# Patient Record
Sex: MALE | Race: WHITE | NOT HISPANIC OR LATINO | Employment: FULL TIME | URBAN - METROPOLITAN AREA
[De-identification: names, ages, dates, MRNs, and addresses within clinical notes are randomized per-mention and may not be internally consistent; named-entity substitution may affect disease eponyms.]

---

## 2018-11-06 ENCOUNTER — LAB VISIT (OUTPATIENT)
Dept: LAB | Facility: HOSPITAL | Age: 43
End: 2018-11-06
Attending: PHYSICAL MEDICINE & REHABILITATION
Payer: COMMERCIAL

## 2018-11-06 DIAGNOSIS — M54.50 LOW BACK PAIN: Primary | ICD-10-CM

## 2018-11-06 LAB
ANION GAP SERPL CALC-SCNC: 9 MMOL/L
BASOPHILS # BLD AUTO: 0.04 K/UL
BASOPHILS NFR BLD: 0.8 %
BUN SERPL-MCNC: 12 MG/DL
CALCIUM SERPL-MCNC: 9.6 MG/DL
CHLORIDE SERPL-SCNC: 107 MMOL/L
CO2 SERPL-SCNC: 22 MMOL/L
CREAT SERPL-MCNC: 0.8 MG/DL
DIFFERENTIAL METHOD: NORMAL
EOSINOPHIL # BLD AUTO: 0.3 K/UL
EOSINOPHIL NFR BLD: 5.3 %
ERYTHROCYTE [DISTWIDTH] IN BLOOD BY AUTOMATED COUNT: 11.9 %
EST. GFR  (AFRICAN AMERICAN): >60 ML/MIN/1.73 M^2
EST. GFR  (NON AFRICAN AMERICAN): >60 ML/MIN/1.73 M^2
GLUCOSE SERPL-MCNC: 119 MG/DL
HCT VFR BLD AUTO: 46.9 %
HGB BLD-MCNC: 15.8 G/DL
IMM GRANULOCYTES # BLD AUTO: 0.01 K/UL
IMM GRANULOCYTES NFR BLD AUTO: 0.2 %
LYMPHOCYTES # BLD AUTO: 1.3 K/UL
LYMPHOCYTES NFR BLD: 26.8 %
MCH RBC QN AUTO: 29.2 PG
MCHC RBC AUTO-ENTMCNC: 33.7 G/DL
MCV RBC AUTO: 87 FL
MONOCYTES # BLD AUTO: 0.4 K/UL
MONOCYTES NFR BLD: 9.1 %
NEUTROPHILS # BLD AUTO: 2.7 K/UL
NEUTROPHILS NFR BLD: 57.8 %
NRBC BLD-RTO: 0 /100 WBC
PLATELET # BLD AUTO: 232 K/UL
PMV BLD AUTO: 10.8 FL
POTASSIUM SERPL-SCNC: 4.7 MMOL/L
RBC # BLD AUTO: 5.41 M/UL
SODIUM SERPL-SCNC: 138 MMOL/L
WBC # BLD AUTO: 4.74 K/UL

## 2018-11-06 PROCEDURE — 85025 COMPLETE CBC W/AUTO DIFF WBC: CPT

## 2018-11-06 PROCEDURE — 80048 BASIC METABOLIC PNL TOTAL CA: CPT

## 2018-11-06 PROCEDURE — 36415 COLL VENOUS BLD VENIPUNCTURE: CPT

## 2022-08-15 DIAGNOSIS — M25.511 RIGHT SHOULDER PAIN, UNSPECIFIED CHRONICITY: Primary | ICD-10-CM

## 2022-10-24 ENCOUNTER — HOSPITAL ENCOUNTER (OUTPATIENT)
Dept: RADIOLOGY | Facility: HOSPITAL | Age: 47
Discharge: HOME | End: 2022-10-24
Attending: PHYSICIAN ASSISTANT
Payer: COMMERCIAL

## 2022-10-24 ENCOUNTER — OFFICE VISIT (OUTPATIENT)
Dept: ORTHOPEDICS | Facility: CLINIC | Age: 47
End: 2022-10-24
Payer: COMMERCIAL

## 2022-10-24 VITALS — BODY MASS INDEX: 45.1 KG/M2 | HEIGHT: 70 IN | WEIGHT: 315 LBS

## 2022-10-24 DIAGNOSIS — R07.89 PAIN OF STERNUM: ICD-10-CM

## 2022-10-24 DIAGNOSIS — R07.89 PAIN OF STERNUM: Primary | ICD-10-CM

## 2022-10-24 DIAGNOSIS — M25.511 RIGHT SHOULDER PAIN, UNSPECIFIED CHRONICITY: ICD-10-CM

## 2022-10-24 DIAGNOSIS — M25.511 RIGHT SHOULDER PAIN, UNSPECIFIED CHRONICITY: Primary | ICD-10-CM

## 2022-10-24 DIAGNOSIS — S43.61XA SPRAIN OF RIGHT STERNOCLAVICULAR JOINT, INITIAL ENCOUNTER: ICD-10-CM

## 2022-10-24 PROCEDURE — 73030 X-RAY EXAM OF SHOULDER: CPT | Mod: TC,RT

## 2022-10-24 PROCEDURE — 73030 X-RAY EXAM OF SHOULDER: CPT | Mod: 26,RT,, | Performed by: RADIOLOGY

## 2022-10-24 PROCEDURE — 1160F RVW MEDS BY RX/DR IN RCRD: CPT | Mod: CPTII,S$GLB,, | Performed by: PHYSICIAN ASSISTANT

## 2022-10-24 PROCEDURE — 71130 X-RAY STRENOCLAVIC JT 3/>VWS: CPT | Mod: 26,,, | Performed by: RADIOLOGY

## 2022-10-24 PROCEDURE — 99999 PR PBB SHADOW E&M-EST. PATIENT-LVL III: ICD-10-PCS | Mod: PBBFAC,,, | Performed by: PHYSICIAN ASSISTANT

## 2022-10-24 PROCEDURE — 71130 X-RAY STRENOCLAVIC JT 3/>VWS: CPT | Mod: TC

## 2022-10-24 PROCEDURE — 1160F PR REVIEW ALL MEDS BY PRESCRIBER/CLIN PHARMACIST DOCUMENTED: ICD-10-PCS | Mod: CPTII,S$GLB,, | Performed by: PHYSICIAN ASSISTANT

## 2022-10-24 PROCEDURE — 71130 XR STERNOCLAVICULAR JOINTS MIN 3 VIEW: ICD-10-PCS | Mod: 26,,, | Performed by: RADIOLOGY

## 2022-10-24 PROCEDURE — 99203 PR OFFICE/OUTPT VISIT, NEW, LEVL III, 30-44 MIN: ICD-10-PCS | Mod: S$GLB,,, | Performed by: PHYSICIAN ASSISTANT

## 2022-10-24 PROCEDURE — 99203 OFFICE O/P NEW LOW 30 MIN: CPT | Mod: S$GLB,,, | Performed by: PHYSICIAN ASSISTANT

## 2022-10-24 PROCEDURE — 1159F MED LIST DOCD IN RCRD: CPT | Mod: CPTII,S$GLB,, | Performed by: PHYSICIAN ASSISTANT

## 2022-10-24 PROCEDURE — 73030 XR SHOULDER COMPLETE 2 OR MORE VIEWS RIGHT: ICD-10-PCS | Mod: 26,RT,, | Performed by: RADIOLOGY

## 2022-10-24 PROCEDURE — 99999 PR PBB SHADOW E&M-EST. PATIENT-LVL III: CPT | Mod: PBBFAC,,, | Performed by: PHYSICIAN ASSISTANT

## 2022-10-24 PROCEDURE — 1159F PR MEDICATION LIST DOCUMENTED IN MEDICAL RECORD: ICD-10-PCS | Mod: CPTII,S$GLB,, | Performed by: PHYSICIAN ASSISTANT

## 2022-10-24 RX ORDER — NAPROXEN 500 MG/1
500 TABLET ORAL 2 TIMES DAILY
Qty: 60 TABLET | Refills: 0 | Status: ON HOLD | OUTPATIENT
Start: 2022-10-24 | End: 2023-10-10 | Stop reason: HOSPADM

## 2022-10-24 RX ORDER — METHYLPREDNISOLONE 4 MG/1
TABLET ORAL
Qty: 21 EACH | Refills: 0 | Status: SHIPPED | OUTPATIENT
Start: 2022-10-24 | End: 2022-11-14

## 2022-10-24 NOTE — PROGRESS NOTES
"Orthopaedics Sports Medicine     Shoulder Initial Visit         10/24/2022    Referring MD: Self, Aaareferral    Chief Complaint   Patient presents with    Right Shoulder - Pain         History of Present Illness:   Pio Navarro is a 47 y.o. right-hand dominant male who presents with right shoulder pain and dysfunction.    Onset of the symptoms was about 2 months ago, mid august     Inciting event: he was working on a car, doing heaving lifting when he felt his clavicle "dislocate"     Current symptoms include pain with lifting the arm, popping, limited rom     Pain is aggravated by abducting the arm, night pain      Evaluation to date: X-Ray     Treatment to date: Rest, activity modification     Past Medical History:   Past Medical History:   Diagnosis Date    Hypertension        Past Surgical History:   Past Surgical History:   Procedure Laterality Date    BACK SURGERY      HAND SURGERY         Medications:  Patient's Medications    No medications on file       Allergies: Review of patient's allergies indicates:  No Known Allergies    Social History:   Home town: [place], [place]  Occupation:    Alcohol use: He has no history on file for alcohol use.  Tobacco use: He has no history on file for tobacco use.    Review of systems:  History of recent illness, fevers, shakes, or chills: no  History of cardiac problems or chest pain: HTN  History of pulmonary problems or asthma: no  History of diabetes: no  History of prior dvt or clotting problems: no  History of sleep apnea: no      Physical Examination:  Estimated body mass index is 48.07 kg/m² as calculated from the following:    Height as of this encounter: 5' 10" (1.778 m).    Weight as of this encounter: 152 kg (335 lb).    General  Healthy appearing male in no acute distress  Alert and oriented, normal mood, appropriate affect    Shoulder Examination:  Patient is alert and oriented, no distress. Skin is intact. Neuro is normal with no focal motor or sensory " findings.    Cervical exam is unremarkable. Intact cervical ROM. Negative Spurling's test    Physical Exam:  RIGHT    LEFT    Scap Dyskinesis/Winging (-)    (-)    Tenderness:          Greater Tuberosity             (-)    (-)  Bicipital Groove  (-)    (-)  AC joint   (-)    (-)  Other:    TTP over SC joint    No visible deformity of right SC joint when comparing to contralateral side    ROM:  Forward Elevation 170     180  Abduction  90 with pain    120  ER (at side)  70     80  IR   T8     T8    Strength:   Supraspinatus  5/5     5/5  Infraspinatus  5/5     5/5  Subscap / IR  5/5     5/5     Special Tests:   Neer:    (-)    (-)   Tran:   (-)    (-)   SS Stress:   (-)    (-)   Bear Hug:   (-)    (-)   [place]'s:   (-)    (-)   Resisted Thrower's:   (-)    (-)   Cross Arm Abduction:  (-)    (-)    Neurovascular examination  - Motor grossly intact bilaterally to shoulder abduction, elbow flexion and extension, wrist flexion and extension, and intrinsic hand musculature  - Sensation intact to light touch bilaterally in axillary, median, radial, and ulnar distributions  - Symmetrical radial pulses      Imaging:  XR Results:  Results for orders placed during the hospital encounter of 10/24/22    X-ray Shoulder 2 or More Views Right    Narrative  EXAMINATION:  XR SHOULDER COMPLETE 2 OR MORE VIEWS RIGHT    CLINICAL HISTORY:  Pain in right shoulder    TECHNIQUE:  Two or three views of the right shoulder were performed.    COMPARISON:  None    FINDINGS:  Minimal spurring at the AC joint which is otherwise intact.  Glenohumeral joint is also well maintained.  There is no fracture dislocation.  Soft tissues are normal.  Visualized right upper lung is clear    Impression  Please see above      Electronically signed by: Brendon Hodge MD  Date:    10/24/2022  Time:    16:06      EXAMINATION:  XR STERNOCLAVICULAR JOINTS MIN 3 VIEW     CLINICAL HISTORY:  Other chest pain     TECHNIQUE:  Three views of the sternoclavicular  joints     COMPARISON:  None     FINDINGS:  Sternoclavicular joints appear intact.  Delete visualized lung zones are clear.  Soft tissues are unremarkable     Impression:     See above        Electronically signed by: Brendon Hodge MD  Date:                                            10/24/2022  Time:                                           16:32    MRI Results:  No results found for this or any previous visit.    CT Results:  No results found for this or any previous visit.      Physician Read: I agree with the above impression.      Impression:  47 y.o. male with a sprain of his right sternoclavicular joint      Plan:  Discussed diagnosis and treatment options with patient today.  His history and physical exam is consistent with a sprain of his right sternoclavicular joint   He has not tried anything thus far.  I recommend beginning with a Medrol Dosepak as well as dedicated anti-inflammatories  Prescription for naproxen 500 BID and a Medrol Dosepak provided  Follow up with me in 8 weeks, if no improvement of symptoms will consider MRI/CT           Alesha Overton PA-C  Sports Medicine Physician Assistant       Disclaimer: This note was prepared using a voice recognition system and is likely to have sound alike errors within the text.

## 2023-02-27 ENCOUNTER — TELEPHONE (OUTPATIENT)
Dept: SPORTS MEDICINE | Facility: CLINIC | Age: 48
End: 2023-02-27
Payer: COMMERCIAL

## 2023-02-27 DIAGNOSIS — M25.562 LEFT KNEE PAIN, UNSPECIFIED CHRONICITY: Primary | ICD-10-CM

## 2023-02-27 NOTE — TELEPHONE ENCOUNTER
Spoke with pt regarding upcoming appt with Dr. Tijerina on 228. Verified L knee pain. Informed pt to arrive 30 min prior to appt for xrays. Pt verbalized understanding of appt date, time, and location.

## 2023-02-28 ENCOUNTER — HOSPITAL ENCOUNTER (OUTPATIENT)
Dept: RADIOLOGY | Facility: HOSPITAL | Age: 48
Discharge: HOME | End: 2023-02-28
Attending: STUDENT IN AN ORGANIZED HEALTH CARE EDUCATION/TRAINING PROGRAM
Payer: OTHER MISCELLANEOUS

## 2023-02-28 ENCOUNTER — OFFICE VISIT (OUTPATIENT)
Dept: SPORTS MEDICINE | Facility: CLINIC | Age: 48
End: 2023-02-28
Payer: OTHER MISCELLANEOUS

## 2023-02-28 VITALS — BODY MASS INDEX: 49.95 KG/M2 | WEIGHT: 315 LBS

## 2023-02-28 DIAGNOSIS — M25.562 LEFT KNEE PAIN, UNSPECIFIED CHRONICITY: ICD-10-CM

## 2023-02-28 DIAGNOSIS — M23.92 INTERNAL DERANGEMENT OF KNEE JOINT, LEFT: ICD-10-CM

## 2023-02-28 DIAGNOSIS — S83.412A SPRAIN OF MEDIAL COLLATERAL LIGAMENT OF LEFT KNEE, INITIAL ENCOUNTER: Primary | ICD-10-CM

## 2023-02-28 PROCEDURE — 73564 X-RAY EXAM KNEE 4 OR MORE: CPT | Mod: TC,LT

## 2023-02-28 PROCEDURE — 73562 X-RAY EXAM OF KNEE 3: CPT | Mod: 26,RT,, | Performed by: RADIOLOGY

## 2023-02-28 PROCEDURE — 99999 PR PBB SHADOW E&M-EST. PATIENT-LVL III: ICD-10-PCS | Mod: PBBFAC,,, | Performed by: STUDENT IN AN ORGANIZED HEALTH CARE EDUCATION/TRAINING PROGRAM

## 2023-02-28 PROCEDURE — 73564 XR KNEE ORTHO LEFT WITH FLEXION: ICD-10-PCS | Mod: 26,LT,, | Performed by: RADIOLOGY

## 2023-02-28 PROCEDURE — 99999 PR PBB SHADOW E&M-EST. PATIENT-LVL III: CPT | Mod: PBBFAC,,, | Performed by: STUDENT IN AN ORGANIZED HEALTH CARE EDUCATION/TRAINING PROGRAM

## 2023-02-28 PROCEDURE — 99204 PR OFFICE/OUTPT VISIT, NEW, LEVL IV, 45-59 MIN: ICD-10-PCS | Mod: S$GLB,,, | Performed by: STUDENT IN AN ORGANIZED HEALTH CARE EDUCATION/TRAINING PROGRAM

## 2023-02-28 PROCEDURE — 73564 X-RAY EXAM KNEE 4 OR MORE: CPT | Mod: 26,LT,, | Performed by: RADIOLOGY

## 2023-02-28 PROCEDURE — 73562 XR KNEE ORTHO LEFT WITH FLEXION: ICD-10-PCS | Mod: 26,RT,, | Performed by: RADIOLOGY

## 2023-02-28 PROCEDURE — 99204 OFFICE O/P NEW MOD 45 MIN: CPT | Mod: S$GLB,,, | Performed by: STUDENT IN AN ORGANIZED HEALTH CARE EDUCATION/TRAINING PROGRAM

## 2023-02-28 NOTE — PATIENT INSTRUCTIONS
Assessment:  Pio Navarro is a 47 y.o. male with a chief complaint of Pain and Swelling of the Left Knee    Encounter Diagnoses   Name Primary?    Sprain of medial collateral ligament of left knee, initial encounter Yes    Internal derangement of knee joint, left       Plan:  XR reviewed today - no significant abnormalities  The patient's history, clinical exam, and imaging findings are consistent with left knee ligament, meniscus and/or cartilage injury. With particular concern for MCL and/or medial meniscus  We recommend an MRI of the left knee to evaluate further    Follow-up: after MRI or sooner if there are any problems between now and then.    Thank you for choosing [facility] and Dr. Jamaal Tijerina for your orthopedic & sports medicine care. It is our goal to provide you with exceptional care that will help keep you healthy, active, and get you back in the game.    Please do not hesitate to reach out to us via email, phone, or MyChart with any questions, concerns, or feedback.    If you are experiencing pain/discomfort ,or have questions after 5pm and would like to be connected to the [facility] on-call team, please call this number and specify which Sports Medicine provider is treating you: [phone]

## 2023-02-28 NOTE — PROGRESS NOTES
Patient ID: Pio Navarro  YOB: 1975  MRN: [id number]    Chief Complaint: Pain and Swelling of the Left Knee    Referred By: Self    Occupation:       History of Present Illness: Pio Navarro is a 47 y.o. male who presents today with Pain and Swelling of the Left Knee    He injured his left knee in mid December 2022 while standing from a squatted position and felt a pop in his knee.  He complains of constant throbbing and sharp pain with pivoting or valgus stress at the knee.  It swells occasionally.  He describes a feeling of instability when he takes a long step or pivots on this leg.  He uses Mobic, ibuprofen, naproxen but no relief.    Past Medical History:   Past Medical History:   Diagnosis Date    Hypertension      Past Surgical History:   Procedure Laterality Date    BACK SURGERY      HAND SURGERY      HERNIA REPAIR  11/2022     Family History   Problem Relation Age of Onset    Cancer Mother     Cancer Father     Cancer Maternal Grandmother     Cancer Maternal Grandfather     Cancer Paternal Grandmother     Cancer Paternal Grandfather      Social History     Socioeconomic History    Marital status:    Tobacco Use    Smoking status: Unknown     Medication List with Changes/Refills   Current Medications    NAPROXEN (NAPROSYN) 500 MG TABLET    Take 1 tablet (500 mg total) by mouth 2 (two) times daily.     Review of patient's allergies indicates:  No Known Allergies    Physical Exam:   Body mass index is 49.95 kg/m².    Physical Exam  Vitals reviewed.   Constitutional:       Appearance: Normal appearance.   HENT:      Head: Normocephalic and atraumatic.      Nose: Nose normal.   Eyes:      Extraocular Movements: Extraocular movements intact.      Conjunctiva/sclera: Conjunctivae normal.   Pulmonary:      Effort: Pulmonary effort is normal.   Skin:     General: Skin is warm and dry.   Neurological:      General: No focal deficit present.      Mental Status: He is alert and oriented  to person, place, and time.   Psychiatric:         Mood and Affect: Mood normal.     Detailed MSK exam:     Left Knee:  Inspection:  Mild joint effusion  Palpation tenderness: Medial joint line  MCL origin at medial femoral epicondyle  Nontender patellofemoral joint  Range of motion: 0 deg extension - 120 deg flexion  Strength:  5-/5 Extension    5-/5 Flexion  Stability: Equivocal ACL/Lachman      stable Posterior Drawer      Stable MCL/Valgus Stress with pain      stable LCL/Varus Stress   Patella Exam:  Increased lateral patellar translation  N/V Exam:  Tibial:    Normal sensory (plantar foot)  Normal motor (FHL)    Sup Peroneal:   Normal sensory (dorsal foot)  Normal motor (Peroneals)            Deep Peroneal:   Normal sensory (1st web space)  Normal motor (EHL)    Sural:   Normal sensory (lateral foot)   Saphenous:   Normal sensory (medial lower leg)   Normal pedal pulses, warm and well perfused with capillary refill < 2 sec       Imaging:  X-ray Knee Ortho Left with Flexion  Narrative: EXAM: XR KNEE ORTHO LEFT WITH FLEXION    CLINICAL INDICATION:   Pain in left knee    FINDINGS:  No comparison studies are available.  AP, oblique and sunrise views of both knees, as well as a lateral view of the left knee were submitted for interpretation.  Negative for left knee joint effusion.  There is a bone island within the left lateral and right medial femoral condyles.  Small left Fabella.    Alignment is satisfactory. No     fractures, dislocations, or erosive arthritic change.  Negative for radiopaque foreign bodies or air in the soft tissues.  Joint spaces are well-maintained.  Impression: 1.  Negative for acute process involving the right or left knee.  2.  Incidental findings as noted above.    Finalized on: 2/28/2023 3:25 PM By:  Mauricio Munoz MD  [facility]# [id number]      2023-02-28 15:27:34.242    [facility]      Relevant imaging results were reviewed and interpreted by me and per my read:  Normal appearing radiographs of the  left knee.  Normal alignment.  No fractures or other acute abnormalities.  No significant degenerative changes.    This was discussed with the patient and / or family today.       Patient Instructions   Assessment:  Pio Navarro is a 47 y.o. male with a chief complaint of Pain and Swelling of the Left Knee    Encounter Diagnoses   Name Primary?    Sprain of medial collateral ligament of left knee, initial encounter Yes    Internal derangement of knee joint, left       Plan:  XR reviewed today - no significant abnormalities  The patient's history, clinical exam, and imaging findings are consistent with left knee ligament, meniscus and/or cartilage injury. With particular concern for MCL and/or medial meniscus  We recommend an MRI of the left knee to evaluate further    Follow-up: after MRI or sooner if there are any problems between now and then.    Thank you for choosing [facility] and Dr. Jamaal Tijerina for your orthopedic & sports medicine care. It is our goal to provide you with exceptional care that will help keep you healthy, active, and get you back in the game.    Please do not hesitate to reach out to us via email, phone, or MyChart with any questions, concerns, or feedback.    If you are experiencing pain/discomfort ,or have questions after 5pm and would like to be connected to the [facility] on-call team, please call this number and specify which Sports Medicine provider is treating you: [phone]          A copy of today's visit note has been sent to the referring provider.       Jamaal Tijerina MD  Primary Care Sports Medicine        Disclaimer: This note was prepared using a voice recognition system and is likely to have sound alike errors within the text.

## 2023-03-08 ENCOUNTER — HOSPITAL ENCOUNTER (OUTPATIENT)
Dept: RADIOLOGY | Facility: HOSPITAL | Age: 48
Discharge: HOME | End: 2023-03-08
Attending: STUDENT IN AN ORGANIZED HEALTH CARE EDUCATION/TRAINING PROGRAM
Payer: OTHER MISCELLANEOUS

## 2023-03-08 DIAGNOSIS — M23.92 INTERNAL DERANGEMENT OF KNEE JOINT, LEFT: ICD-10-CM

## 2023-03-08 DIAGNOSIS — S83.412A SPRAIN OF MEDIAL COLLATERAL LIGAMENT OF LEFT KNEE, INITIAL ENCOUNTER: ICD-10-CM

## 2023-03-08 PROCEDURE — 73721 MRI JNT OF LWR EXTRE W/O DYE: CPT | Mod: TC,LT

## 2023-03-08 PROCEDURE — 73721 MRI JNT OF LWR EXTRE W/O DYE: CPT | Mod: 26,LT,, | Performed by: RADIOLOGY

## 2023-03-08 PROCEDURE — 73721 MRI KNEE WITHOUT CONTRAST LEFT: ICD-10-PCS | Mod: 26,LT,, | Performed by: RADIOLOGY

## 2023-03-08 NOTE — PROGRESS NOTES
Patient ID: Pio Navarro  YOB: 1975  MRN: [id number]    Chief Complaint: Pain of the Left Knee    Referred By: Self    Occupation:       History of Present Illness: Pio Navarro is a 47 y.o. male who presents today with Pain of the Left Knee    He injured his left knee in mid December 2022 while standing from a squatted position and felt a pop in his knee.  He complains of constant throbbing and sharp pain with pivoting or valgus stress at the knee.  It swells occasionally.  He describes a feeling of instability when he takes a long step or pivots on this leg.  He uses Mobic, ibuprofen, naproxen but no relief.    He was initially evaluated in our office on 2/28/23.   He was diagnosed with MCL sprain and possible internal derangement and referred for an MRI to evaluate further.  He returns today to review the results.  No symptom changes.    Past Medical History:   Past Medical History:   Diagnosis Date    Hypertension      Past Surgical History:   Procedure Laterality Date    BACK SURGERY      HAND SURGERY      HERNIA REPAIR  11/2022     Family History   Problem Relation Age of Onset    Cancer Mother     Cancer Father     Cancer Maternal Grandmother     Cancer Maternal Grandfather     Cancer Paternal Grandmother     Cancer Paternal Grandfather      Social History     Socioeconomic History    Marital status:    Tobacco Use    Smoking status: Unknown     Medication List with Changes/Refills   New Medications    DICLOFENAC (VOLTAREN) 75 MG EC TABLET    Take 1 tablet (75 mg total) by mouth 2 (two) times daily as needed.   Current Medications    NAPROXEN (NAPROSYN) 500 MG TABLET    Take 1 tablet (500 mg total) by mouth 2 (two) times daily.     Review of patient's allergies indicates:  No Known Allergies    Physical Exam:   There is no height or weight on file to calculate BMI.    Physical Exam  Vitals reviewed.   Constitutional:       Appearance: Normal appearance.   HENT:      Head:  Normocephalic and atraumatic.      Nose: Nose normal.   Eyes:      Extraocular Movements: Extraocular movements intact.      Conjunctiva/sclera: Conjunctivae normal.   Pulmonary:      Effort: Pulmonary effort is normal.   Skin:     General: Skin is warm and dry.   Neurological:      General: No focal deficit present.      Mental Status: He is alert and oriented to person, place, and time.   Psychiatric:         Mood and Affect: Mood normal.     Detailed MSK exam:     Left Knee:  Inspection:  Trace joint effusion  Palpation tenderness: Medial joint line  Nontender patellofemoral joint  Range of motion: 0 deg extension - 120 deg flexion  Strength:  5-/5 Extension    5-/5 Flexion  Stability: Stable ACL/Lachman      stable Posterior Drawer      Stable MCL/Valgus Stress with pain      stable LCL/Varus Stress   Meniscus Exam: Pain with Rodriguez's        Positive Thessaly's   N/V Exam:  Tibial:    Normal sensory (plantar foot)  Normal motor (FHL)    Sup Peroneal:   Normal sensory (dorsal foot)  Normal motor (Peroneals)            Deep Peroneal:   Normal sensory (1st web space)  Normal motor (EHL)    Sural:   Normal sensory (lateral foot)   Saphenous:   Normal sensory (medial lower leg)   Normal pedal pulses, warm and well perfused with capillary refill < 2 sec       Imaging:  MRI Knee Without Contrast Left  Narrative: EXAMINATION:  MRI KNEE WITHOUT CONTRAST LEFT    CLINICAL HISTORY:  Knee trauma, internal derangement suspected, xray done;Sprain of medial collateral ligament of left knee, initial encounter    TECHNIQUE:  Multiplanar, multisequence images were performed about the left knee.  No contrast was administered.    COMPARISON:  Left knee x-ray, 02/28/2023    FINDINGS:  Horizontal cleavage tear of the body and posterior horn of the medial meniscus exiting the undersurface.  Lateral meniscus intact.    ACL, PCL, medial collateral ligament, lateral collateral complex, popliteus tendon, and extensor mechanism  intact.    Full-thickness chondral loss in the median patellar ridge with underlying subchondral marrow edema.  Articular cartilage in the medial and lateral compartments is fairly well preserved.  Trace joint effusion.  Tiny Baker's cyst.    No fracture or avascular necrosis or acute osteochondral lesion.  Impression: 1. Horizontal cleavage tear of the body and posterior horn medial meniscus.  2. Patellar chondromalacia with full-thickness chondral fissuring.  3. Trace joint effusion and tiny Baker cyst.    Electronically signed by: Jose Small MD  Date:    03/08/2023  Time:    09:24      Relevant imaging results were reviewed and interpreted by me and per my read:    Horizontal tear of the body and posterior horn of the medial meniscus.  Patellar chondromalacia.  Small effusion.  Intact MCL and LCL.  Intact ACL and PCL.  No fractures or other acute/osseous abnormalities.    Patient Instructions   Assessment:  Pio Navarro is a 47 y.o. male with a chief complaint of Pain of the Left Knee    Encounter Diagnoses   Name Primary?    Tear of medial meniscus of left knee, current, unspecified tear type, subsequent encounter Yes    Chondromalacia, left knee     Chronic pain of left knee       Plan:  MRI reviewed today - horizontal cleavage tear of the medial meniscus, patellar chondromalacia  Diagnosis, treatment options prognosis discussed in detail  I recommend starting with conservative management for his meniscus tear, and chondromalacia; discussed the surgical intervention is usually not first-line for this type of injury, and given his age surgery for his cartilage itself is not likely indicated  We will proceed with ultrasound-guided left knee corticosteroid injection today  Prescribe anti-inflammatory diclofenac 75 mg which she can take twice daily, as needed for pain  We will order for PT at [facility], 2 to 3 times a week for the next 6-8 weeks  We will follow up in 6 weeks, re-evaluate, evaluate efficacy  of corticosteroid injection, and we can also consider future intervention such as viscosupplementation injection  Ultimately, we did discuss that should he fail conservative management, we can consider surgical evaluation for arthroscopy  Patient did agree with the plan of care, all questions answered      Follow-up: 6 weeks or sooner if there are any problems between now and then.    Thank you for choosing [facility] and Dr. Jamaal Tijerina for your orthopedic & sports medicine care. It is our goal to provide you with exceptional care that will help keep you healthy, active, and get you back in the game.    Please do not hesitate to reach out to us via email, phone, or MyChart with any questions, concerns, or feedback.    If you are experiencing pain/discomfort ,or have questions after 5pm and would like to be connected to the [facility] on-call team, please call this number and specify which Sports Medicine provider is treating you: [phone]       A copy of today's visit note has been sent to the referring provider.     Jamaal Tijerina MD  Primary Care Sports Medicine    Disclaimer: This note was prepared using a voice recognition system and is likely to have sound alike errors within the text.

## 2023-03-09 ENCOUNTER — OFFICE VISIT (OUTPATIENT)
Dept: SPORTS MEDICINE | Facility: CLINIC | Age: 48
End: 2023-03-09
Payer: OTHER MISCELLANEOUS

## 2023-03-09 DIAGNOSIS — M94.262 CHONDROMALACIA, LEFT KNEE: ICD-10-CM

## 2023-03-09 DIAGNOSIS — E66.01 CLASS 3 SEVERE OBESITY WITH BODY MASS INDEX (BMI) OF 45.0 TO 49.9 IN ADULT, UNSPECIFIED OBESITY TYPE, UNSPECIFIED WHETHER SERIOUS COMORBIDITY PRESENT: ICD-10-CM

## 2023-03-09 DIAGNOSIS — G89.29 CHRONIC PAIN OF LEFT KNEE: ICD-10-CM

## 2023-03-09 DIAGNOSIS — S83.242D TEAR OF MEDIAL MENISCUS OF LEFT KNEE, CURRENT, UNSPECIFIED TEAR TYPE, SUBSEQUENT ENCOUNTER: Primary | ICD-10-CM

## 2023-03-09 DIAGNOSIS — M25.562 CHRONIC PAIN OF LEFT KNEE: ICD-10-CM

## 2023-03-09 PROCEDURE — 99214 PR OFFICE/OUTPT VISIT, EST, LEVL IV, 30-39 MIN: ICD-10-PCS | Mod: 25,S$GLB,, | Performed by: STUDENT IN AN ORGANIZED HEALTH CARE EDUCATION/TRAINING PROGRAM

## 2023-03-09 PROCEDURE — 99999 PR PBB SHADOW E&M-EST. PATIENT-LVL III: ICD-10-PCS | Mod: PBBFAC,,, | Performed by: STUDENT IN AN ORGANIZED HEALTH CARE EDUCATION/TRAINING PROGRAM

## 2023-03-09 PROCEDURE — 99999 PR PBB SHADOW E&M-EST. PATIENT-LVL III: CPT | Mod: PBBFAC,,, | Performed by: STUDENT IN AN ORGANIZED HEALTH CARE EDUCATION/TRAINING PROGRAM

## 2023-03-09 PROCEDURE — 20611 LARGE JOINT ASPIRATION/INJECTION: L KNEE: ICD-10-PCS | Mod: LT,S$GLB,, | Performed by: STUDENT IN AN ORGANIZED HEALTH CARE EDUCATION/TRAINING PROGRAM

## 2023-03-09 PROCEDURE — 99214 OFFICE O/P EST MOD 30 MIN: CPT | Mod: 25,S$GLB,, | Performed by: STUDENT IN AN ORGANIZED HEALTH CARE EDUCATION/TRAINING PROGRAM

## 2023-03-09 PROCEDURE — 20611 DRAIN/INJ JOINT/BURSA W/US: CPT | Mod: LT,S$GLB,, | Performed by: STUDENT IN AN ORGANIZED HEALTH CARE EDUCATION/TRAINING PROGRAM

## 2023-03-09 RX ORDER — TRIAMCINOLONE ACETONIDE 40 MG/ML
40 INJECTION, SUSPENSION INTRA-ARTICULAR; INTRAMUSCULAR
Status: DISCONTINUED | OUTPATIENT
Start: 2023-03-09 | End: 2023-03-09 | Stop reason: HOSPADM

## 2023-03-09 RX ORDER — DICLOFENAC SODIUM 75 MG/1
75 TABLET, DELAYED RELEASE ORAL 2 TIMES DAILY PRN
Qty: 60 TABLET | Refills: 2 | Status: ON HOLD | OUTPATIENT
Start: 2023-03-09 | End: 2023-10-10 | Stop reason: HOSPADM

## 2023-03-09 RX ADMIN — TRIAMCINOLONE ACETONIDE 40 MG: 40 INJECTION, SUSPENSION INTRA-ARTICULAR; INTRAMUSCULAR at 08:03

## 2023-03-09 NOTE — PROCEDURES
Large Joint Aspiration/Injection: L knee    Date/Time: 3/9/2023 8:00 AM  Performed by: Jamaal Tijerina MD  Authorized by: Jamaal Tijerina MD     Consent Done?:  Yes (Verbal)  Indications:  Pain, joint swelling and arthritis  Site marked: the procedure site was marked    Timeout: prior to procedure the correct patient, procedure, and site was verified    Prep: patient was prepped and draped in usual sterile fashion      Local anesthesia used?: Yes    Anesthesia:  Local infiltration  Local anesthetic:  Bupivacaine 0.5% without epinephrine, lidocaine 1% without epinephrine and topical anesthetic  Anesthetic total (ml):  4      Details:  Needle Size:  21 G  Ultrasonic Guidance for needle placement?: Yes    Images are saved and documented.  Approach:  Superior  Location:  Knee  Site:  L knee  Medications:  40 mg triamcinolone acetonide 40 mg/mL  Patient tolerance:  Patient tolerated the procedure well with no immediate complications     Ultrasound guidance was used for needle localization. Images were saved and stored for documentation. The appropriate structures were visualized. Dynamic visualization of the needle was continuous throughout the procedures and maintained good position.     We discussed the proper protocols after the injection such as no submerging pools, baths tubs, or hot tubs for 48 hr.  Showering is okay today.  We also discussed that blood sugars can be elevated after an injection and asked patient to properly check their sugars over the next few days and contact their PCP if there are any concerns.  We discussed red flags such as fevers, chills, red, warm, tender joint at the area of injection to please seek medical care immediately.

## 2023-03-09 NOTE — PATIENT INSTRUCTIONS
Assessment:  Pio Navarro is a 47 y.o. male with a chief complaint of Pain of the Left Knee    Encounter Diagnoses   Name Primary?    Tear of medial meniscus of left knee, current, unspecified tear type, subsequent encounter Yes    Chondromalacia, left knee     Chronic pain of left knee       Plan:  MRI reviewed today - horizontal cleavage tear of the medial meniscus, patellar chondromalacia  Diagnosis, treatment options prognosis discussed in detail  I recommend starting with conservative management for his meniscus tear, and chondromalacia; discussed the surgical intervention is usually not first-line for this type of injury, and given his age surgery for his cartilage itself is not likely indicated  We will proceed with ultrasound-guided left knee corticosteroid injection today  Prescribe anti-inflammatory diclofenac 75 mg which she can take twice daily, as needed for pain  We will order for PT at [facility], 2 to 3 times a week for the next 6-8 weeks  We will follow up in 6 weeks, re-evaluate, evaluate efficacy of corticosteroid injection, and we can also consider future intervention such as viscosupplementation injection  Ultimately, we did discuss that should he fail conservative management, we can consider surgical evaluation for arthroscopy  Patient did agree with the plan of care, all questions answered      Follow-up: 6 weeks or sooner if there are any problems between now and then.    Thank you for choosing [facility] and Dr. Jamaal Tijerina for your orthopedic & sports medicine care. It is our goal to provide you with exceptional care that will help keep you healthy, active, and get you back in the game.    Please do not hesitate to reach out to us via email, phone, or MyChart with any questions, concerns, or feedback.    If you are experiencing pain/discomfort ,or have questions after 5pm and would like to be connected to the [facility]  on-call team, please call this number and specify which Sports Medicine provider is treating you: [phone]

## 2023-04-27 ENCOUNTER — OFFICE VISIT (OUTPATIENT)
Dept: SPORTS MEDICINE | Facility: CLINIC | Age: 48
End: 2023-04-27
Payer: COMMERCIAL

## 2023-04-27 DIAGNOSIS — M17.12 LOCALIZED PRIMARY OSTEOARTHRITIS OF LEFT LOWER LEG: ICD-10-CM

## 2023-04-27 DIAGNOSIS — S83.242D TEAR OF MEDIAL MENISCUS OF LEFT KNEE, CURRENT, UNSPECIFIED TEAR TYPE, SUBSEQUENT ENCOUNTER: ICD-10-CM

## 2023-04-27 DIAGNOSIS — G89.29 CHRONIC PAIN OF LEFT KNEE: Primary | ICD-10-CM

## 2023-04-27 DIAGNOSIS — M94.262 CHONDROMALACIA, LEFT KNEE: ICD-10-CM

## 2023-04-27 DIAGNOSIS — M25.562 CHRONIC PAIN OF LEFT KNEE: Primary | ICD-10-CM

## 2023-04-27 PROCEDURE — 99214 PR OFFICE/OUTPT VISIT, EST, LEVL IV, 30-39 MIN: ICD-10-PCS | Mod: 25,S$GLB,, | Performed by: STUDENT IN AN ORGANIZED HEALTH CARE EDUCATION/TRAINING PROGRAM

## 2023-04-27 PROCEDURE — 99999 PR PBB SHADOW E&M-EST. PATIENT-LVL III: CPT | Mod: PBBFAC,,, | Performed by: STUDENT IN AN ORGANIZED HEALTH CARE EDUCATION/TRAINING PROGRAM

## 2023-04-27 PROCEDURE — 99999 PR PBB SHADOW E&M-EST. PATIENT-LVL III: ICD-10-PCS | Mod: PBBFAC,,, | Performed by: STUDENT IN AN ORGANIZED HEALTH CARE EDUCATION/TRAINING PROGRAM

## 2023-04-27 PROCEDURE — 99214 OFFICE O/P EST MOD 30 MIN: CPT | Mod: 25,S$GLB,, | Performed by: STUDENT IN AN ORGANIZED HEALTH CARE EDUCATION/TRAINING PROGRAM

## 2023-04-27 PROCEDURE — 97110 PR THERAPEUTIC EXERCISES: ICD-10-PCS | Mod: GP,S$GLB,, | Performed by: STUDENT IN AN ORGANIZED HEALTH CARE EDUCATION/TRAINING PROGRAM

## 2023-04-27 PROCEDURE — 97110 THERAPEUTIC EXERCISES: CPT | Mod: GP,S$GLB,, | Performed by: STUDENT IN AN ORGANIZED HEALTH CARE EDUCATION/TRAINING PROGRAM

## 2023-04-27 NOTE — PATIENT INSTRUCTIONS
Assessment:  Pio Navarro is a 47 y.o. male with a chief complaint of Pain of the Left Knee    Encounter Diagnoses   Name Primary?    Chronic pain of left knee Yes    Localized primary osteoarthritis of left lower leg     Tear of medial meniscus of left knee, current, unspecified tear type, subsequent encounter     Chondromalacia, left knee       Plan:  Unfortunately, he is not had much improvement thus far with our conservative management  He has been taking multiple different NSAIDs, without much relief.  We have tried corticosteroid injection, which provided sustained relief.  He was not able to do physical therapy, due to financial reasons, but he has been doing daily home exercises, that he got off the Internet, and while he is noticed some improvement in his leg strengthening instability, it has not helped his pain overall.  Due to continued pain, despite the above efforts, would recommend next course of treatment with a viscosupplementation injection.  We will order for Durolane injection in the left knee.  MEDICAL NECESSITY FOR VISCOSUPPLEMENTATION: After thorough evaluation of the patient, I have determined that visco-supplementation is medically necessary. The patient has painful DJD of the knee with failure of conservative therapy including lifestyle modifications and rehabilitation exercises. Oral analgesis/NSAIDs have not adequately controlled symptoms and there is radiographic evidence of joint space narrowing, subchondral sclerosis, and some early osteophytic changes, or in lack of radiographic evidence, there is arthroscopic or other evidence of chondrosis.  Kellgren- Pelon grade 2 or greater.  Additional home exercise program instructions given today, as below  At least 15 minutes were spent developing, teaching, and performing a home exercise program under the direction of Jamaal Tijerina MD.  A written summary was provided and all questions were answered.  CPT 89127-EM.  If not improving after  Visco shot and with home exercises, may need to consider evaluation by surgeon for possible arthroscopy    Follow-up:  3-4 weeks for Durolane injection left knee or sooner if there are any problems between now and then.    Thank you for choosing [facility] and Dr. Jamaal Tijerina for your orthopedic & sports medicine care. It is our goal to provide you with exceptional care that will help keep you healthy, active, and get you back in the game.    Please do not hesitate to reach out to us via email, phone, or MyChart with any questions, concerns, or feedback.    If you are experiencing pain/discomfort ,or have questions after 5pm and would like to be connected to the [facility] on-call team, please call this number and specify which Sports Medicine provider is treating you: [phone]       Home Exercises

## 2023-04-27 NOTE — PROGRESS NOTES
Patient ID: Pio Navarro  YOB: 1975  MRN: [id number]    Chief Complaint: Pain of the Left Knee        Occupation: mechani    History of Present Illness: Pio Navarro is a 47 y.o. male who presents today with Left knee pain.     The patient is active in none.    Pio is here today for f/u L knee pain. He had a CSI on 3/9 and reports having had about 5 weeks of relief. His pain today is about 3/10. He was unable to do any formal PT due to the price, so he has been doing exercises from home. His pain is still worsened by stairs, medial movements, and pivoting. His only relief is from rest.    Past Medical History:   Past Medical History:   Diagnosis Date    Hypertension      Past Surgical History:   Procedure Laterality Date    BACK SURGERY      HAND SURGERY      HERNIA REPAIR  11/2022     Family History   Problem Relation Age of Onset    Cancer Mother     Cancer Father     Cancer Maternal Grandmother     Cancer Maternal Grandfather     Cancer Paternal Grandmother     Cancer Paternal Grandfather      Social History     Socioeconomic History    Marital status:    Tobacco Use    Smoking status: Unknown     Medication List with Changes/Refills   Current Medications    DICLOFENAC (VOLTAREN) 75 MG EC TABLET    Take 1 tablet (75 mg total) by mouth 2 (two) times daily as needed.    NAPROXEN (NAPROSYN) 500 MG TABLET    Take 1 tablet (500 mg total) by mouth 2 (two) times daily.     Review of patient's allergies indicates:  No Known Allergies    Physical Exam:   There is no height or weight on file to calculate BMI.    Physical Exam  Vitals reviewed.   Constitutional:       Appearance: Normal appearance.   HENT:      Head: Normocephalic and atraumatic.      Nose: Nose normal.   Eyes:      Extraocular Movements: Extraocular movements intact.      Conjunctiva/sclera: Conjunctivae normal.   Pulmonary:      Effort: Pulmonary effort is normal.   Skin:     General: Skin is warm and dry.   Neurological:       General: No focal deficit present.      Mental Status: He is alert and oriented to person, place, and time.   Psychiatric:         Mood and Affect: Mood normal.         Detailed MSK exam:   Left Knee:  Inspection:       Trace joint effusion  Palpation tenderness: Medial joint line  Nontender patellofemoral joint  Range of motion: 0 deg extension - 120 deg flexion  Strength:         5-/5 Extension                          5-/5 Flexion  Stability: Stable ACL/Lachman                 stable Posterior Drawer                 Stable MCL/Valgus Stress with pain                 stable LCL/Varus Stress   Meniscus Exam: Pain with Rodriguez's                              Positive Thessaly's   N/V Exam:       Tibial:                           Normal sensory (plantar foot)             Normal motor (FHL)                Sup Peroneal:                         Normal sensory (dorsal foot)              Normal motor (Peroneals)                                Deep Peroneal:                       Normal sensory (1st web space)                    Normal motor (EHL)                 Sural:                           Normal sensory (lateral foot)              Saphenous:                 Normal sensory (medial lower leg)              Normal pedal pulses, warm and well perfused with capillary refill < 2 sec        Imaging:  MRI Knee Without Contrast Left  Narrative: EXAMINATION:  MRI KNEE WITHOUT CONTRAST LEFT    CLINICAL HISTORY:  Knee trauma, internal derangement suspected, xray done;Sprain of medial collateral ligament of left knee, initial encounter    TECHNIQUE:  Multiplanar, multisequence images were performed about the left knee.  No contrast was administered.    COMPARISON:  Left knee x-ray, 02/28/2023    FINDINGS:  Horizontal cleavage tear of the body and posterior horn of the medial meniscus exiting the undersurface.  Lateral meniscus intact.    ACL, PCL, medial collateral ligament, lateral collateral complex, popliteus tendon, and  extensor mechanism intact.    Full-thickness chondral loss in the median patellar ridge with underlying subchondral marrow edema.  Articular cartilage in the medial and lateral compartments is fairly well preserved.  Trace joint effusion.  Tiny Baker's cyst.    No fracture or avascular necrosis or acute osteochondral lesion.  Impression: 1. Horizontal cleavage tear of the body and posterior horn medial meniscus.  2. Patellar chondromalacia with full-thickness chondral fissuring.  3. Trace joint effusion and tiny Baker cyst.    Electronically signed by: Jose Small MD  Date:    03/08/2023  Time:    09:24      This was discussed with the patient and / or family today.       Patient Instructions   Assessment:  Pio Navarro is a 47 y.o. male with a chief complaint of Pain of the Left Knee    Encounter Diagnoses   Name Primary?    Chronic pain of left knee Yes    Localized primary osteoarthritis of left lower leg     Tear of medial meniscus of left knee, current, unspecified tear type, subsequent encounter     Chondromalacia, left knee       Plan:  Unfortunately, he is not had much improvement thus far with our conservative management  He has been taking multiple different NSAIDs, without much relief.  We have tried corticosteroid injection, which provided sustained relief.  He was not able to do physical therapy, due to financial reasons, but he has been doing daily home exercises, that he got off the Internet, and while he is noticed some improvement in his leg strengthening instability, it has not helped his pain overall.  Due to continued pain, despite the above efforts, would recommend next course of treatment with a viscosupplementation injection.  We will order for Durolane injection in the left knee.  MEDICAL NECESSITY FOR VISCOSUPPLEMENTATION: After thorough evaluation of the patient, I have determined that visco-supplementation is medically necessary. The patient has painful DJD of the knee with failure of  conservative therapy including lifestyle modifications and rehabilitation exercises. Oral analgesis/NSAIDs have not adequately controlled symptoms and there is radiographic evidence of joint space narrowing, subchondral sclerosis, and some early osteophytic changes, or in lack of radiographic evidence, there is arthroscopic or other evidence of chondrosis.  Kellgren- Pelon grade 2 or greater.  Additional home exercise program instructions given today, as below  At least 15 minutes were spent developing, teaching, and performing a home exercise program under the direction of Jamaal Tijerina MD.  A written summary was provided and all questions were answered.  CPT 93476-TY.  If not improving after Visco shot and with home exercises, may need to consider evaluation by surgeon for possible arthroscopy    Follow-up:  3-4 weeks for Durolane injection left knee or sooner if there are any problems between now and then.    Thank you for choosing [facility] and Dr. Jamaal Tijerina for your orthopedic & sports medicine care. It is our goal to provide you with exceptional care that will help keep you healthy, active, and get you back in the game.    Please do not hesitate to reach out to us via email, phone, or MyChart with any questions, concerns, or feedback.    If you are experiencing pain/discomfort ,or have questions after 5pm and would like to be connected to the [facility] on-call team, please call this number and specify which Sports Medicine provider is treating you: [phone]       Jamaal Tijerina MD  Primary Care Sports Medicine      Disclaimer: This note was prepared using a voice recognition system and is likely to have sound alike errors within the text.

## 2023-05-25 ENCOUNTER — OFFICE VISIT (OUTPATIENT)
Dept: SPORTS MEDICINE | Facility: CLINIC | Age: 48
End: 2023-05-25
Payer: COMMERCIAL

## 2023-05-25 DIAGNOSIS — M25.562 CHRONIC PAIN OF LEFT KNEE: Primary | ICD-10-CM

## 2023-05-25 DIAGNOSIS — M17.12 LOCALIZED PRIMARY OSTEOARTHRITIS OF LEFT LOWER LEG: ICD-10-CM

## 2023-05-25 DIAGNOSIS — M94.262 CHONDROMALACIA, LEFT KNEE: ICD-10-CM

## 2023-05-25 DIAGNOSIS — G89.29 CHRONIC PAIN OF LEFT KNEE: Primary | ICD-10-CM

## 2023-05-25 PROCEDURE — 99499 UNLISTED E&M SERVICE: CPT | Mod: S$GLB,,, | Performed by: STUDENT IN AN ORGANIZED HEALTH CARE EDUCATION/TRAINING PROGRAM

## 2023-05-25 PROCEDURE — 20611 LARGE JOINT ASPIRATION/INJECTION: L KNEE: ICD-10-PCS | Mod: LT,S$GLB,, | Performed by: STUDENT IN AN ORGANIZED HEALTH CARE EDUCATION/TRAINING PROGRAM

## 2023-05-25 PROCEDURE — 20611 DRAIN/INJ JOINT/BURSA W/US: CPT | Mod: LT,S$GLB,, | Performed by: STUDENT IN AN ORGANIZED HEALTH CARE EDUCATION/TRAINING PROGRAM

## 2023-05-25 PROCEDURE — 99499 NO LOS: ICD-10-PCS | Mod: S$GLB,,, | Performed by: STUDENT IN AN ORGANIZED HEALTH CARE EDUCATION/TRAINING PROGRAM

## 2023-05-25 PROCEDURE — 99999 PR PBB SHADOW E&M-EST. PATIENT-LVL III: ICD-10-PCS | Mod: PBBFAC,,, | Performed by: STUDENT IN AN ORGANIZED HEALTH CARE EDUCATION/TRAINING PROGRAM

## 2023-05-25 PROCEDURE — 99999 PR PBB SHADOW E&M-EST. PATIENT-LVL III: CPT | Mod: PBBFAC,,, | Performed by: STUDENT IN AN ORGANIZED HEALTH CARE EDUCATION/TRAINING PROGRAM

## 2023-05-25 NOTE — PATIENT INSTRUCTIONS
Assessment:  Pio Navarro is a 47 y.o. male with a chief complaint of Pain of the Left Knee    Encounter Diagnoses   Name Primary?    Chronic pain of left knee Yes    Localized primary osteoarthritis of left lower leg     Chondromalacia, left knee       Plan:  Left knee Durolane injection today  We discussed the proper protocols after the injection such as no submerging pools, baths tubs, or hot tubs for 24 hr.  Showering is okay today.    We discussed red flags such as fevers, chills, red, warm, tender joint at the area of injection to please seek medical care immediately.      Follow-up: 6 weeks or sooner if there are any problems between now and then.    Thank you for choosing [facility] and Dr. Jamaal Tijerina for your orthopedic & sports medicine care. It is our goal to provide you with exceptional care that will help keep you healthy, active, and get you back in the game.    Please do not hesitate to reach out to us via email, phone, or MyChart with any questions, concerns, or feedback.    If you are experiencing pain/discomfort ,or have questions after 5pm and would like to be connected to the [facility] on-call team, please call this number and specify which Sports Medicine provider is treating you: [phone]

## 2023-05-25 NOTE — PROCEDURES
Large Joint Aspiration/Injection: L knee    Date/Time: 5/25/2023 1:40 PM  Performed by: Jamaal Tijerina MD  Authorized by: Jamaal Tijerina MD     Consent Done?:  Yes (Verbal)  Indications:  Arthritis and pain  Site marked: the procedure site was marked    Timeout: prior to procedure the correct patient, procedure, and site was verified      Local anesthesia used?: Yes    Local anesthetic:  Topical anesthetic    Details:  Needle Size:  22 G  Ultrasonic Guidance for needle placement?: Yes    Images are saved and documented.  Approach: superolateral.  Location:  Knee  Site:  L knee  Medications:  60 mg hyaluronate sodium, stabilized (DUROLANE) 60 mg/3 mL  Patient tolerance:  Patient tolerated the procedure well with no immediate complications     Ultrasound guidance was used for needle localization. Images were saved and stored for documentation. The appropriate structures were visualized. Dynamic visualization of the needle was continuous throughout the procedures and maintained good position.     We discussed the proper protocols after the injection such as no submerging pools, baths tubs, or hot tubs for 24 hr.  Showering is okay today.  We also discussed that blood sugars can be elevated after an injection and asked patient to properly check their sugars over the next few days and contact their PCP if there are any concerns.  We discussed red flags such as fevers, chills, red, warm, tender joint at the area of injection to please seek medical care immediately.

## 2023-05-30 ENCOUNTER — TELEPHONE (OUTPATIENT)
Dept: SPORTS MEDICINE | Facility: CLINIC | Age: 48
End: 2023-05-30
Payer: COMMERCIAL

## 2023-05-30 NOTE — TELEPHONE ENCOUNTER
Called pt back. He denies having any fever, chills, warmth at injection site.  He reports increased swelling and pain going from a 2-3 before injection to a 10.  Advised him to take it easy on his knee, use ice and compression for the inflammation and swelling.  Notified him that Dr. Tijerina was out this week but I would inquire with our team to see if there is anything else that can be done for him in the meantime.

## 2023-05-30 NOTE — TELEPHONE ENCOUNTER
----- Message from Alex Coffman sent at 5/30/2023  9:00 AM CDT -----  Contact: Pio Spencer is calling in regards to wanting to speak with someone about knee injection. Pt stated that his left knee feels worse. Please call back at [phone]              Thanks  KT

## 2023-05-30 NOTE — TELEPHONE ENCOUNTER
Called and notified pt that he should be good to continue with the instructions I previously provided over the phone. Offered to schedule a follow up for next week. He denied and will wait it out to see how he feels.

## 2023-07-06 ENCOUNTER — OFFICE VISIT (OUTPATIENT)
Dept: SPORTS MEDICINE | Facility: CLINIC | Age: 48
End: 2023-07-06
Payer: COMMERCIAL

## 2023-07-06 DIAGNOSIS — M25.562 CHRONIC PAIN OF LEFT KNEE: ICD-10-CM

## 2023-07-06 DIAGNOSIS — S83.242D TEAR OF MEDIAL MENISCUS OF LEFT KNEE, CURRENT, UNSPECIFIED TEAR TYPE, SUBSEQUENT ENCOUNTER: Primary | ICD-10-CM

## 2023-07-06 DIAGNOSIS — M17.12 LOCALIZED PRIMARY OSTEOARTHRITIS OF LEFT LOWER LEG: ICD-10-CM

## 2023-07-06 DIAGNOSIS — M94.262 CHONDROMALACIA, LEFT KNEE: ICD-10-CM

## 2023-07-06 DIAGNOSIS — G89.29 CHRONIC PAIN OF LEFT KNEE: ICD-10-CM

## 2023-07-06 PROCEDURE — 99213 PR OFFICE/OUTPT VISIT, EST, LEVL III, 20-29 MIN: ICD-10-PCS | Mod: S$GLB,,, | Performed by: STUDENT IN AN ORGANIZED HEALTH CARE EDUCATION/TRAINING PROGRAM

## 2023-07-06 PROCEDURE — 99213 OFFICE O/P EST LOW 20 MIN: CPT | Mod: S$GLB,,, | Performed by: STUDENT IN AN ORGANIZED HEALTH CARE EDUCATION/TRAINING PROGRAM

## 2023-07-06 PROCEDURE — 99999 PR PBB SHADOW E&M-EST. PATIENT-LVL III: ICD-10-PCS | Mod: PBBFAC,,, | Performed by: STUDENT IN AN ORGANIZED HEALTH CARE EDUCATION/TRAINING PROGRAM

## 2023-07-06 PROCEDURE — 99999 PR PBB SHADOW E&M-EST. PATIENT-LVL III: CPT | Mod: PBBFAC,,, | Performed by: STUDENT IN AN ORGANIZED HEALTH CARE EDUCATION/TRAINING PROGRAM

## 2023-07-06 NOTE — PATIENT INSTRUCTIONS
Assessment:  Pio Navarro is a 47 y.o. male with a chief complaint of Pain of the Left Knee    Encounter Diagnoses   Name Primary?    Tear of medial meniscus of left knee, current, unspecified tear type, subsequent encounter Yes    Chronic pain of left knee     Localized primary osteoarthritis of left lower leg     Chondromalacia, left knee       Plan:  Unfortunately, patient did not get significant relief following Durolane injection at last visit.  Now 6 weeks out from that injection, only with a few days of relief in the last week or so.  Pain is back and pre-injection levels.  Has failed multiple efforts with conservative management thus far, including Tylenol, NSAIDs, physician directed home exercise program, corticosteroid injection, and now Durolane injection.  While radiographs are relatively benign, MRI does show large horizontal tear of the posterior horn medial meniscus, as well as chondromalacia.  Given failure of conservative management thus far, is appropriate for surgical evaluation.  We will refer to Dr. Og Roberts, at [facility] for arthroscopic evaluation, possible meniscal repair  Can follow up with our clinic on an as-needed basis, depending on treatment determination with Dr. Roberts    Follow-up:  As needed or sooner if there are any problems between now and then.    Thank you for choosing [facility] and Dr. Jamaal Tijerina for your orthopedic & sports medicine care. It is our goal to provide you with exceptional care that will help keep you healthy, active, and get you back in the game.    Please do not hesitate to reach out to us via email, phone, or MyChart with any questions, concerns, or feedback.    If you are experiencing pain/discomfort ,or have questions after 5pm and would like to be connected to the [facility] on-call team, please call this number and specify which Sports Medicine provider is treating you: [phone]

## 2023-07-06 NOTE — PROGRESS NOTES
Patient ID: Pio Navarro  YOB: 1975  MRN: [id number]    Chief Complaint: Pain of the Left Knee    Occupation:     History of Present Illness: Pio Navarro is a 47 y.o. male who presents today with Left knee pain.     He was initially evaluated in our office on 2/28/23.  He had injured his knee in December 2022 when he felt a pop in his knee while rising from a squat position. An MRI was ordered which demonstrated a medial meniscus tear with patellar chondromalacia.  He was treated with US guided CSI, diclofenac 75mg, and PT at [facility].  He failed to improve and was subsequently treated with a left knee Durolane injection on 5/25/23.      Today, he reports that after his Durolane injection he had worsening pain for about 10 days before returning to baseline.  He has noticed slight improvement in his limp but continues to have significant pain with weight-bearing activity and at night.  He also reports that he noticed some drainage from a spot below his injection site that resolved after a few days.  He is confident that it was not coming from the injection site.  He continues to have persistent swelling in the knee but no reports of fever, chills, night sweats or other signs of infection.    Past Medical History:   Past Medical History:   Diagnosis Date    Hypertension      Past Surgical History:   Procedure Laterality Date    BACK SURGERY      HAND SURGERY      HERNIA REPAIR  11/2022     Family History   Problem Relation Age of Onset    Cancer Mother     Cancer Father     Cancer Maternal Grandmother     Cancer Maternal Grandfather     Cancer Paternal Grandmother     Cancer Paternal Grandfather      Social History     Socioeconomic History    Marital status:    Tobacco Use    Smoking status: Unknown     Medication List with Changes/Refills   Current Medications    DICLOFENAC (VOLTAREN) 75 MG EC TABLET    Take 1 tablet (75 mg total) by mouth 2 (two) times daily as needed.    NAPROXEN  (NAPROSYN) 500 MG TABLET    Take 1 tablet (500 mg total) by mouth 2 (two) times daily.     Review of patient's allergies indicates:  No Known Allergies    Physical Exam:   There is no height or weight on file to calculate BMI.    Physical Exam  Vitals reviewed.   Constitutional:       Appearance: Normal appearance.   HENT:      Head: Normocephalic and atraumatic.      Nose: Nose normal.   Eyes:      Extraocular Movements: Extraocular movements intact.      Conjunctiva/sclera: Conjunctivae normal.   Pulmonary:      Effort: Pulmonary effort is normal.   Skin:     General: Skin is warm and dry.   Neurological:      General: No focal deficit present.      Mental Status: He is alert and oriented to person, place, and time.   Psychiatric:         Mood and Affect: Mood normal.     Detailed MSK exam:     Left Knee:  Inspection:       Trace joint effusion  Palpation tenderness: Medial joint line  Nontender patellofemoral joint  Range of motion: 0 deg extension - 120 deg flexion  Strength:         5-/5 Extension                          5-/5 Flexion  Stability: Stable ACL/Lachman                 stable Posterior Drawer                 Stable MCL/Valgus Stress with pain                 stable LCL/Varus Stress   Meniscus Exam: Pain with Rodriguez's                              Positive Thessaly's   N/V Exam:       Tibial:                           Normal sensory (plantar foot)             Normal motor (FHL)                Sup Peroneal:                         Normal sensory (dorsal foot)              Normal motor (Peroneals)                                Deep Peroneal:                       Normal sensory (1st web space)                    Normal motor (EHL)                 Sural:                           Normal sensory (lateral foot)              Saphenous:                 Normal sensory (medial lower leg)              Normal pedal pulses, warm and well perfused with capillary refill < 2 sec      Imaging:    No new imaging  today    Patient Instructions   Assessment:  Pio Navarro is a 47 y.o. male with a chief complaint of Pain of the Left Knee    Encounter Diagnoses   Name Primary?    Tear of medial meniscus of left knee, current, unspecified tear type, subsequent encounter Yes    Chronic pain of left knee     Localized primary osteoarthritis of left lower leg     Chondromalacia, left knee       Plan:  Unfortunately, patient did not get significant relief following Durolane injection at last visit.  Now 6 weeks out from that injection, only with a few days of relief in the last week or so.  Pain is back and pre-injection levels.  Has failed multiple efforts with conservative management thus far, including Tylenol, NSAIDs, physician directed home exercise program, corticosteroid injection, and now Durolane injection.  While radiographs are relatively benign, MRI does show large horizontal tear of the posterior horn medial meniscus, as well as chondromalacia.  Given failure of conservative management thus far, is appropriate for surgical evaluation.  We will refer to Dr. Og Roberts, at [facility] for arthroscopic evaluation, possible meniscal repair  Can follow up with our clinic on an as-needed basis, depending on treatment determination with Dr. Roberts    Follow-up:  As needed or sooner if there are any problems between now and then.    Thank you for choosing [facility] and Dr. Jamaal Tijerina for your orthopedic & sports medicine care. It is our goal to provide you with exceptional care that will help keep you healthy, active, and get you back in the game.    Please do not hesitate to reach out to us via email, phone, or MyChart with any questions, concerns, or feedback.    If you are experiencing pain/discomfort ,or have questions after 5pm and would like to be connected to the [facility] [facility] on-call team, please call this number and specify which Sports Medicine  provider is treating you: [phone]          Jamaal Tijerina MD  Primary Care Sports Medicine    Disclaimer: This note was prepared using a voice recognition system and is likely to have sound alike errors within the text.

## 2023-07-19 ENCOUNTER — OFFICE VISIT (OUTPATIENT)
Dept: SPORTS MEDICINE | Facility: CLINIC | Age: 48
End: 2023-07-19
Payer: OTHER MISCELLANEOUS

## 2023-07-19 DIAGNOSIS — M25.562 CHRONIC PAIN OF LEFT KNEE: ICD-10-CM

## 2023-07-19 DIAGNOSIS — S83.242D TEAR OF MEDIAL MENISCUS OF LEFT KNEE, CURRENT, UNSPECIFIED TEAR TYPE, SUBSEQUENT ENCOUNTER: ICD-10-CM

## 2023-07-19 DIAGNOSIS — G89.29 CHRONIC PAIN OF LEFT KNEE: ICD-10-CM

## 2023-07-19 DIAGNOSIS — M94.262 CHONDROMALACIA, LEFT KNEE: ICD-10-CM

## 2023-07-19 PROCEDURE — 99999 PR PBB SHADOW E&M-EST. PATIENT-LVL III: ICD-10-PCS | Mod: PBBFAC,,, | Performed by: ORTHOPAEDIC SURGERY

## 2023-07-19 PROCEDURE — 99214 PR OFFICE/OUTPT VISIT, EST, LEVL IV, 30-39 MIN: ICD-10-PCS | Mod: S$GLB,,, | Performed by: ORTHOPAEDIC SURGERY

## 2023-07-19 PROCEDURE — 99999 PR PBB SHADOW E&M-EST. PATIENT-LVL III: CPT | Mod: PBBFAC,,, | Performed by: ORTHOPAEDIC SURGERY

## 2023-07-19 PROCEDURE — 99214 OFFICE O/P EST MOD 30 MIN: CPT | Mod: S$GLB,,, | Performed by: ORTHOPAEDIC SURGERY

## 2023-07-19 NOTE — PATIENT INSTRUCTIONS
Assessment:  Pio Navarro is a 47 y.o. male    with a chief complaint of Pain of the Left Knee (L knee mmt surgical consult)    Medial knee pain from workplace injury left knee dec 21 2022  Mri showing horizontal meniscus tear    Encounter Diagnoses   Name Primary?    Chronic pain of left knee     Chondromalacia, left knee     Tear of medial meniscus of left knee, current, unspecified tear type, subsequent encounter       Plan:  PT for left knee degen meniscus tear @[facility] (patient location)  Discussed surgical and non-surgical options  Patient hasn't done PT yet, recommend trying 8 weeks of this first    Although there is not a cure for arthritis, there are effective ways to improve symptoms.     Exercise & Activity:  I recommend low impact activities such as elliptical and bicycle   Walking is great for arthritis: https://www.[employer].com/3-reasons-walking-with-knee-arthritis/  If walking long distances, I recommend good quality well-cushioned shoes. Varsity sports can help you find the right ones: https://www.[employer]quia.[employer]/  Aquatic and pool therapy is often helpful because it lessens the impact on the joint, strengthens the leg and thigh muscles, and helps to control swelling.   Knee motion is important to the health of the knee.     Knee Braces:  A compression knee sleeve can help limit swelling and provide proprioceptive feedback.     Prescriptions & Medications:  I do recommend formal physical therapy or at minimum a home exercise program.   Over the counter analgesic (pain-relieving) medications can help. Examples are Tylenol, Ibuprofen, and Aleve. Check with your primary care physician to make sure you don't have contra-indications to taking those medicines.  Some over the counter supplement solutions such as glucosamine and chondroitin may help with symptoms, although the evidence is mixed.    Healthy Lifestyle:  Excess body weight can have a negative impact on joint health and on  pain. I recommend healthy lifestyle choices including nutrition and exercise that help reach and maintain an ideal body weight. Tips for Exercise: https://www.[employer]/13-exercise-tips-for-a-healthier-you/  Some diets cause increased inflammation. I recommend a balanced wholesome diet including some foods such as olives that are shown to decrease inflammation. More diet information available here: https://www.[employer].Halldis/8-best-foods-for-knee-arthritis/     Follow-up: 6 weeks or sooner if there are any problems between now and then.    Leave Review:   Google: Leave Google Review  Healthgrades: Leave Healthgrades Review    After Hours Number: [phone]

## 2023-07-28 NOTE — PROGRESS NOTES
Patient ID: Pio Navarro  YOB: 1975  MRN: [id number]    Chief Complaint: Pain of the Left Knee (L knee mmt surgical consult)      Referred By:     History of Present Illness: Pio Navarro is a  47 y.o. male    with a chief complaint of Pain of the Left Knee (L knee mmt surgical consult)        HPI    Past Medical History:   Past Medical History:   Diagnosis Date    Hypertension      Past Surgical History:   Procedure Laterality Date    BACK SURGERY      HAND SURGERY      HERNIA REPAIR  11/2022     Family History   Problem Relation Age of Onset    Cancer Mother     Cancer Father     Cancer Maternal Grandmother     Cancer Maternal Grandfather     Cancer Paternal Grandmother     Cancer Paternal Grandfather      Social History     Socioeconomic History    Marital status:    Tobacco Use    Smoking status: Unknown     Medication List with Changes/Refills   Current Medications    DICLOFENAC (VOLTAREN) 75 MG EC TABLET    Take 1 tablet (75 mg total) by mouth 2 (two) times daily as needed.    NAPROXEN (NAPROSYN) 500 MG TABLET    Take 1 tablet (500 mg total) by mouth 2 (two) times daily.     Review of patient's allergies indicates:  No Known Allergies  ROS    Physical Exam:   There is no height or weight on file to calculate BMI.  There were no vitals filed for this visit.   GENERAL: Well appearing, appropriate for stated age, no acute distress.  CARDIOVASCULAR: Pulses regular by peripheral palpation.  PULMONARY: Respirations are even and non-labored.  NEURO: Awake, alert, and oriented x 3.  PSYCH: Mood & affect are appropriate.  HEENT: Head is normocephalic and atraumatic.  Ortho/SPM Exam  Left knee: ttp med JL  Ttp pf joint  + crepitus  Mild eff  Stable ligamentously  NVID    Imaging:    MRI Knee Without Contrast Left  Narrative: EXAMINATION:  MRI KNEE WITHOUT CONTRAST LEFT    CLINICAL HISTORY:  Knee trauma, internal derangement suspected, xray done;Sprain of medial collateral ligament of left  knee, initial encounter    TECHNIQUE:  Multiplanar, multisequence images were performed about the left knee.  No contrast was administered.    COMPARISON:  Left knee x-ray, 02/28/2023    FINDINGS:  Horizontal cleavage tear of the body and posterior horn of the medial meniscus exiting the undersurface.  Lateral meniscus intact.    ACL, PCL, medial collateral ligament, lateral collateral complex, popliteus tendon, and extensor mechanism intact.    Full-thickness chondral loss in the median patellar ridge with underlying subchondral marrow edema.  Articular cartilage in the medial and lateral compartments is fairly well preserved.  Trace joint effusion.  Tiny Baker's cyst.    No fracture or avascular necrosis or acute osteochondral lesion.  Impression: 1. Horizontal cleavage tear of the body and posterior horn medial meniscus.  2. Patellar chondromalacia with full-thickness chondral fissuring.  3. Trace joint effusion and tiny Baker cyst.    Electronically signed by: Jose Small MD  Date:    03/08/2023  Time:    09:24      Relevant imaging results reviewed and interpreted by me, discussed with the patient and / or family today.     Other Tests:     Patient Instructions   Assessment:  Pio Navarro is a 47 y.o. male    with a chief complaint of Pain of the Left Knee (L knee mmt surgical consult)    Medial knee pain from workplace injury left knee dec 21 2022  Mri showing horizontal meniscus tear    Encounter Diagnoses   Name Primary?    Chronic pain of left knee     Chondromalacia, left knee     Tear of medial meniscus of left knee, current, unspecified tear type, subsequent encounter       Plan:  PT for left knee degen meniscus tear @[facility] ([facility] location)  Discussed surgical and non-surgical options  Patient hasn't done PT yet, recommend trying 8 weeks of this first    Although there is not a cure for arthritis, there are effective ways to improve symptoms.     Exercise & Activity:  I recommend low impact  activities such as elliptical and bicycle   Walking is great for arthritis: https://www.[employer]MyFamily.com.[employer]/3-reasons-walking-with-knee-arthritis/  If walking long distances, I recommend good quality well-cushioned shoes. Varsity sports can help you find the right ones: https://www.[employer]/  Aquatic and pool therapy is often helpful because it lessens the impact on the joint, strengthens the leg and thigh muscles, and helps to control swelling.   Knee motion is important to the health of the knee.     Knee Braces:  A compression knee sleeve can help limit swelling and provide proprioceptive feedback.     Prescriptions & Medications:  I do recommend formal physical therapy or at minimum a home exercise program.   Over the counter analgesic (pain-relieving) medications can help. Examples are Tylenol, Ibuprofen, and Aleve. Check with your primary care physician to make sure you don't have contra-indications to taking those medicines.  Some over the counter supplement solutions such as glucosamine and chondroitin may help with symptoms, although the evidence is mixed.    Healthy Lifestyle:  Excess body weight can have a negative impact on joint health and on pain. I recommend healthy lifestyle choices including nutrition and exercise that help reach and maintain an ideal body weight. Tips for Exercise: https://www.DateMyFamily.com.[employer]/13-exercise-tips-for-a-healthier-you/  Some diets cause increased inflammation. I recommend a balanced wholesome diet including some foods such as olives that are shown to decrease inflammation. More diet information available here: https://www.DateMyFamily.com.[employer]/8-best-foods-for-knee-arthritis/     Follow-up: 6 weeks or sooner if there are any problems between now and then.    Leave Review:   Google: Leave Google Review  Healthgrades: Leave Healthgrades Review    After Hours Number: [phone]    Provider Note/Medical Decision Making:       I discussed worrisome and red  flag signs and symptoms with the patient. The patient expressed understanding and agreed to alert me immediately or to go to the emergency room if they experience any of these.   Treatment plan was developed with input from the patient/family, and they expressed understanding and agreement with the plan. All questions were answered today.          Og Roberts MD  Orthopaedic Surgery & Sports Medicine       Disclaimer: This note was prepared using a voice recognition system and is likely to have sound alike errors within the text.

## 2023-07-31 ENCOUNTER — TELEPHONE (OUTPATIENT)
Dept: SPORTS MEDICINE | Facility: CLINIC | Age: 48
End: 2023-07-31
Payer: COMMERCIAL

## 2023-07-31 NOTE — TELEPHONE ENCOUNTER
Spoke with wife. Explained that because it is work comp we may be waiting approval through them, but that I will contact our work comp representative and have her reach out to them to provide an update.       ----- Message from Kenia Sanchez sent at 7/31/2023  8:55 AM CDT -----  Contact: [phone]  Calling regarding pt /physical therapy, wife states it has 2 weeks with no response needing paper work filled in order for pt to go to physical therapy . Please call back today [phone] . Thanksdj

## 2023-08-30 ENCOUNTER — OFFICE VISIT (OUTPATIENT)
Dept: SPORTS MEDICINE | Facility: CLINIC | Age: 48
End: 2023-08-30
Payer: COMMERCIAL

## 2023-08-30 ENCOUNTER — LAB VISIT (OUTPATIENT)
Dept: LAB | Facility: HOSPITAL | Age: 48
End: 2023-08-30
Attending: ORTHOPAEDIC SURGERY
Payer: COMMERCIAL

## 2023-08-30 VITALS — WEIGHT: 315 LBS | HEIGHT: 70 IN | BODY MASS INDEX: 45.1 KG/M2

## 2023-08-30 DIAGNOSIS — S83.242D TEAR OF MEDIAL MENISCUS OF LEFT KNEE, CURRENT, UNSPECIFIED TEAR TYPE, SUBSEQUENT ENCOUNTER: ICD-10-CM

## 2023-08-30 DIAGNOSIS — E66.01 CLASS 3 SEVERE OBESITY WITH BODY MASS INDEX (BMI) OF 45.0 TO 49.9 IN ADULT, UNSPECIFIED OBESITY TYPE, UNSPECIFIED WHETHER SERIOUS COMORBIDITY PRESENT: ICD-10-CM

## 2023-08-30 DIAGNOSIS — M25.562 CHRONIC PAIN OF LEFT KNEE: ICD-10-CM

## 2023-08-30 DIAGNOSIS — G89.29 CHRONIC PAIN OF LEFT KNEE: ICD-10-CM

## 2023-08-30 DIAGNOSIS — M17.12 PRIMARY OSTEOARTHRITIS OF LEFT KNEE: Primary | ICD-10-CM

## 2023-08-30 DIAGNOSIS — M94.262 CHONDROMALACIA, LEFT KNEE: ICD-10-CM

## 2023-08-30 DIAGNOSIS — S83.242D TEAR OF MEDIAL MENISCUS OF LEFT KNEE, CURRENT, UNSPECIFIED TEAR TYPE, SUBSEQUENT ENCOUNTER: Primary | ICD-10-CM

## 2023-08-30 LAB
APTT PPP: 27.3 SEC (ref 21–32)
BASOPHILS # BLD AUTO: 0.03 K/UL (ref 0–0.2)
BASOPHILS NFR BLD: 0.6 % (ref 0–1.9)
DIFFERENTIAL METHOD: NORMAL
EOSINOPHIL # BLD AUTO: 0.1 K/UL (ref 0–0.5)
EOSINOPHIL NFR BLD: 1.5 % (ref 0–8)
ERYTHROCYTE [DISTWIDTH] IN BLOOD BY AUTOMATED COUNT: 12.2 % (ref 11.5–14.5)
HCT VFR BLD AUTO: 46.5 % (ref 40–54)
HGB BLD-MCNC: 15.4 G/DL (ref 14–18)
IMM GRANULOCYTES # BLD AUTO: 0.01 K/UL (ref 0–0.04)
IMM GRANULOCYTES NFR BLD AUTO: 0.2 % (ref 0–0.5)
INR PPP: 1 (ref 0.8–1.2)
LYMPHOCYTES # BLD AUTO: 1.3 K/UL (ref 1–4.8)
LYMPHOCYTES NFR BLD: 24.2 % (ref 18–48)
MCH RBC QN AUTO: 29.2 PG (ref 27–31)
MCHC RBC AUTO-ENTMCNC: 33.1 G/DL (ref 32–36)
MCV RBC AUTO: 88 FL (ref 82–98)
MONOCYTES # BLD AUTO: 0.4 K/UL (ref 0.3–1)
MONOCYTES NFR BLD: 8.1 % (ref 4–15)
NEUTROPHILS # BLD AUTO: 3.5 K/UL (ref 1.8–7.7)
NEUTROPHILS NFR BLD: 65.4 % (ref 38–73)
NRBC BLD-RTO: 0 /100 WBC
PLATELET # BLD AUTO: 245 K/UL (ref 150–450)
PMV BLD AUTO: 10.2 FL (ref 9.2–12.9)
PROTHROMBIN TIME: 10.8 SEC (ref 9–12.5)
RBC # BLD AUTO: 5.27 M/UL (ref 4.6–6.2)
WBC # BLD AUTO: 5.34 K/UL (ref 3.9–12.7)

## 2023-08-30 PROCEDURE — 80048 BASIC METABOLIC PNL TOTAL CA: CPT | Performed by: ORTHOPAEDIC SURGERY

## 2023-08-30 PROCEDURE — 99214 PR OFFICE/OUTPT VISIT, EST, LEVL IV, 30-39 MIN: ICD-10-PCS | Mod: S$GLB,,, | Performed by: ORTHOPAEDIC SURGERY

## 2023-08-30 PROCEDURE — 85610 PROTHROMBIN TIME: CPT | Performed by: ORTHOPAEDIC SURGERY

## 2023-08-30 PROCEDURE — 99999 PR PBB SHADOW E&M-EST. PATIENT-LVL IV: ICD-10-PCS | Mod: PBBFAC,,, | Performed by: ORTHOPAEDIC SURGERY

## 2023-08-30 PROCEDURE — 99999 PR PBB SHADOW E&M-EST. PATIENT-LVL IV: CPT | Mod: PBBFAC,,, | Performed by: ORTHOPAEDIC SURGERY

## 2023-08-30 PROCEDURE — 36415 COLL VENOUS BLD VENIPUNCTURE: CPT | Performed by: ORTHOPAEDIC SURGERY

## 2023-08-30 PROCEDURE — 85730 THROMBOPLASTIN TIME PARTIAL: CPT | Performed by: ORTHOPAEDIC SURGERY

## 2023-08-30 PROCEDURE — 99214 OFFICE O/P EST MOD 30 MIN: CPT | Mod: S$GLB,,, | Performed by: ORTHOPAEDIC SURGERY

## 2023-08-30 PROCEDURE — 85025 COMPLETE CBC W/AUTO DIFF WBC: CPT | Performed by: ORTHOPAEDIC SURGERY

## 2023-08-30 RX ORDER — VALSARTAN AND HYDROCHLOROTHIAZIDE 80; 12.5 MG/1; MG/1
TABLET, FILM COATED ORAL
COMMUNITY

## 2023-08-30 NOTE — PATIENT INSTRUCTIONS
Assessment:  Pio Navarro is a 48 y.o. male    with a chief complaint of Pain of the Left Knee    Medial knee pain from workplace injury left knee dec 21 2022  Mri showing horizontal meniscus tear  Left knee osteoarthrits     Encounter Diagnoses   Name Primary?    Primary osteoarthritis of left knee Yes    Chondromalacia, left knee     Tear of medial meniscus of left knee, current, unspecified tear type, subsequent encounter     Class 3 severe obesity with body mass index (BMI) of 45.0 to 49.9 in adult, unspecified obesity type, unspecified whether serious comorbidity present     Chronic pain of left knee       Plan:  Left knee arthroscopy, possible meniscectomy, any indicated procedures.  Patient has tried and failed conservative treatment. Did have a long discussion about realistic expectations and that this will not improve the underlying arthritis.   Pre-op labs on the way out  PCP Clearance prior to surgery, Jarrod Fonseca   Pre-op visit with [facility] prior to surgery      Follow-up: Surgeryor sooner if there are any problems between now and then.    Leave Review:   Google: Leave Google Review  Healthgrades: Leave Healthgrades Review    After Hours Number: [phone]

## 2023-08-30 NOTE — PROGRESS NOTES
Patient ID: Pio Navarro  YOB: 1975  MRN: [id number]    Chief Complaint: Pain of the Left Knee      Referred By: Dr. Tijerina    History of Present Illness: Pio Navarro is a  48 y.o. male    with a chief complaint of Pain of the Left Knee    Patient presents today for left knee pain. Patient reports a pain level of 4 out of 10. He [facility] He feels it did strengthen his leg and knee but did not help with the pain.    He was initially evaluated in our office on 2/28/23 by Dr. Tijerina.  He had injured his knee in December 2022 when he felt a pop in his knee while rising from a squat position. An MRI was ordered which demonstrated a medial meniscus tear with patellar chondromalacia.  He was treated with US guided CSI, diclofenac 75mg, and PT at [facility].  He failed to improve and was subsequently treated with a left knee Durolane injection on 5/25/23.  He reports that after his Durolane injection he had worsening pain for about 10 days before returning to baseline.  He has noticed slight improvement in his limp but continues to have significant pain with weight-bearing activity and at night.  He also reports that he noticed some drainage from a spot below his injection site that resolved after a few days.  He is confident that it was not coming from the injection site.  He continues to have persistent swelling in the knee but no reports of fever, chills, night sweats or other signs of infection.  HPI    Past Medical History:   Past Medical History:   Diagnosis Date    Hypertension      Past Surgical History:   Procedure Laterality Date    BACK SURGERY      HAND SURGERY      HERNIA REPAIR  11/2022     Family History   Problem Relation Age of Onset    Cancer Mother     Cancer Father     Cancer Maternal Grandmother     Cancer Maternal Grandfather     Cancer Paternal Grandmother     Cancer Paternal Grandfather      Social History      Socioeconomic History    Marital status:    Tobacco Use    Smoking status: Unknown   Substance and Sexual Activity    Alcohol use: Yes     Comment: 12 beers a year    Drug use: Never    Sexual activity: Yes     Medication List with Changes/Refills   Current Medications    DICLOFENAC (VOLTAREN) 75 MG EC TABLET    Take 1 tablet (75 mg total) by mouth 2 (two) times daily as needed.    NAPROXEN (NAPROSYN) 500 MG TABLET    Take 1 tablet (500 mg total) by mouth 2 (two) times daily.    VALSARTAN-HYDROCHLOROTHIAZIDE (DIOVAN-HCT) 80-12.5 MG PER TABLET    Take by mouth.     Review of patient's allergies indicates:   Allergen Reactions    Egg Nausea And Vomiting     ROS    Physical Exam:   Body mass index is 49.95 kg/m².  There were no vitals filed for this visit.   GENERAL: Well appearing, appropriate for stated age, no acute distress.  CARDIOVASCULAR: Pulses regular by peripheral palpation.  PULMONARY: Respirations are even and non-labored.  NEURO: Awake, alert, and oriented x 3.  PSYCH: Mood & affect are appropriate.  HEENT: Head is normocephalic and atraumatic.              Left Knee Exam     Inspection   Effusion: present    Tenderness   The patient tender to palpation of the medial joint line.    Crepitus   The patient has crepitus of the patella.    Tests   Meniscus   Rodriguez:  Medial - positive     Other   Sensation: normal    Comments:  Intact EHL, FHL, gastrocsoleus, and tibialis anterior. Sensation intact to light touch in superficial peroneal, deep peroneal, tibial, sural, and saphenous nerve distributions. Foot warm and well perfused with capillary refill of less than 2 seconds and palpable pedal pulses.      Muscle Strength   Left Lower Extremity   Hip Abduction: 5/5   Quadriceps:  5/5   Hamstrin/5     Vascular Exam       Left Pulses  Dorsalis Pedis:      2+  Posterior Tibial:      2+        Imaging:    MRI Knee Without Contrast Left  Narrative: EXAMINATION:  MRI KNEE WITHOUT CONTRAST  LEFT    CLINICAL HISTORY:  Knee trauma, internal derangement suspected, xray done;Sprain of medial collateral ligament of left knee, initial encounter    TECHNIQUE:  Multiplanar, multisequence images were performed about the left knee.  No contrast was administered.    COMPARISON:  Left knee x-ray, 02/28/2023    FINDINGS:  Horizontal cleavage tear of the body and posterior horn of the medial meniscus exiting the undersurface.  Lateral meniscus intact.    ACL, PCL, medial collateral ligament, lateral collateral complex, popliteus tendon, and extensor mechanism intact.    Full-thickness chondral loss in the median patellar ridge with underlying subchondral marrow edema.  Articular cartilage in the medial and lateral compartments is fairly well preserved.  Trace joint effusion.  Tiny Baker's cyst.    No fracture or avascular necrosis or acute osteochondral lesion.  Impression: 1. Horizontal cleavage tear of the body and posterior horn medial meniscus.  2. Patellar chondromalacia with full-thickness chondral fissuring.  3. Trace joint effusion and tiny Baker cyst.    Electronically signed by: Jose Small MD  Date:    03/08/2023  Time:    09:24      Relevant imaging results reviewed and interpreted by me, discussed with the patient and / or family today.     Other Tests:         Patient Instructions   Assessment:  Poi Navarro is a 48 y.o. male    with a chief complaint of Pain of the Left Knee    Medial knee pain from workplace injury left knee dec 21 2022  Mri showing horizontal meniscus tear  Left knee osteoarthrits     Encounter Diagnoses   Name Primary?    Primary osteoarthritis of left knee Yes    Chondromalacia, left knee     Tear of medial meniscus of left knee, current, unspecified tear type, subsequent encounter     Class 3 severe obesity with body mass index (BMI) of 45.0 to 49.9 in adult, unspecified obesity type, unspecified whether serious comorbidity present     Chronic pain of left knee        Plan:  Right knee arthroscopy, possible meniscectomy, any indicated procedures.  Patient has tried and failed conservative treatment. Did have a long discussion about realistic expectations and that this will not improve the underlying arthritis.   Pre-op labs on the way out  PCP Clearance prior to surgery, Jarrod Fonseca   Pre-op visit with [facility]alle prior to surgery    Although there is not a cure for arthritis, there are effective ways to improve symptoms.     Exercise & Activity:  I recommend low impact activities such as elliptical and bicycle   Walking is great for arthritis: https://www.[employer]ullhorn.com/3-reasons-walking-with-knee-arthritis/  If walking long distances, I recommend good quality well-cushioned shoes. Varsity sports can help you find the right ones: https://www.[employer]/  Aquatic and pool therapy is often helpful because it lessens the impact on the joint, strengthens the leg and thigh muscles, and helps to control swelling.   Knee motion is important to the health of the knee.     Knee Braces:  A compression knee sleeve can help limit swelling and provide proprioceptive feedback.     Prescriptions & Medications:  I do recommend formal physical therapy or at minimum a home exercise program.   Over the counter analgesic (pain-relieving) medications can help. Examples are Tylenol, Ibuprofen, and Aleve. Check with your primary care physician to make sure you don't have contra-indications to taking those medicines.  Some over the counter supplement solutions such as glucosamine and chondroitin may help with symptoms, although the evidence is mixed.    Healthy Lifestyle:  Excess body weight can have a negative impact on joint health and on pain. I recommend healthy lifestyle choices including nutrition and exercise that help reach and maintain an ideal body weight. Tips for Exercise: https://www.Bullhorn.com/13-exercise-tips-for-a-healthier-you/  Some diets cause increased  inflammation. I recommend a balanced wholesome diet including some foods such as olives that are shown to decrease inflammation. More diet information available here: https://www.[employer]/8-best-foods-for-knee-arthritis/     Follow-up: Surgeryor sooner if there are any problems between now and then.    Leave Review:   Google: Leave Google Review  Healthgrades: Leave Healthgrades Review    After Hours Number: [phone]      Provider Note/Medical Decision Making:       I had a long discussion with the patient about treatment options, including operative and nonoperative treatments. We discussed pros and cons of each including risks pertinent to surgery including pain, infection, bleeding, damage to adjacent structures like nerves and blood vessels, failure to heal, need for future surgeries, stiffness, instability, loss of limb, anesthesia risks like stroke, blood clot, loss of life. We discussed the possibility of need for later hardware removal in the case that hardware was used. We discussed common and uncommon risks, and discussed patient specific factors that may increase the risks present with surgery. All questions were answered. The patient expressed understanding of the pros and cons of surgery and wanted to proceed with surgical treatment.    I discussed worrisome and red flag signs and symptoms with the patient. The patient expressed understanding and agreed to alert me immediately or to go to the emergency room if they experience any of these.   Treatment plan was developed with input from the patient/family, and they expressed understanding and agreement with the plan. All questions were answered today.          Og Roberts MD  Orthopaedic Surgery & Sports Medicine       Disclaimer: This note was prepared using a voice recognition system and is likely to have sound alike errors within the text.

## 2023-08-30 NOTE — PROGRESS NOTES
Patient ID: Pio Navarro  YOB: 1975  MRN: [id number]    Chief Complaint: Pain of the Left Knee      Referred By: ***    History of Present Illness: Pio Navarro is a  48 y.o. male    with a chief complaint of Pain of the Left Knee    Patient presents today for left knee pain. Patient reports a pain level of 4 out of 10. [facility] He feels it did strengthen his leg and knee but did not help with the pain.    HPI    Past Medical History:   Past Medical History:   Diagnosis Date    Hypertension      Past Surgical History:   Procedure Laterality Date    BACK SURGERY      HAND SURGERY      HERNIA REPAIR  11/2022     Family History   Problem Relation Age of Onset    Cancer Mother     Cancer Father     Cancer Maternal Grandmother     Cancer Maternal Grandfather     Cancer Paternal Grandmother     Cancer Paternal Grandfather      Social History     Socioeconomic History    Marital status:    Tobacco Use    Smoking status: Unknown   Substance and Sexual Activity    Alcohol use: Yes     Comment: 12 beers a year    Drug use: Never    Sexual activity: Yes     Medication List with Changes/Refills   Current Medications    DICLOFENAC (VOLTAREN) 75 MG EC TABLET    Take 1 tablet (75 mg total) by mouth 2 (two) times daily as needed.    NAPROXEN (NAPROSYN) 500 MG TABLET    Take 1 tablet (500 mg total) by mouth 2 (two) times daily.    VALSARTAN-HYDROCHLOROTHIAZIDE (DIOVAN-HCT) 80-12.5 MG PER TABLET    Take by mouth.     Review of patient's allergies indicates:   Allergen Reactions    Egg Nausea And Vomiting     ROS    Physical Exam:   Body mass index is 49.95 kg/m².  There were no vitals filed for this visit.   GENERAL: Well appearing, appropriate for stated age, no acute distress.  CARDIOVASCULAR: Pulses regular by peripheral palpation.  PULMONARY: Respirations are even and non-labored.  NEURO: Awake, alert, and oriented x 3.  PSYCH:  Mood & affect are appropriate.  HEENT: Head is normocephalic and atraumatic.  Ortho/SPM Exam  ***    Imaging:    MRI Knee Without Contrast Left  Narrative: EXAMINATION:  MRI KNEE WITHOUT CONTRAST LEFT    CLINICAL HISTORY:  Knee trauma, internal derangement suspected, xray done;Sprain of medial collateral ligament of left knee, initial encounter    TECHNIQUE:  Multiplanar, multisequence images were performed about the left knee.  No contrast was administered.    COMPARISON:  Left knee x-ray, 02/28/2023    FINDINGS:  Horizontal cleavage tear of the body and posterior horn of the medial meniscus exiting the undersurface.  Lateral meniscus intact.    ACL, PCL, medial collateral ligament, lateral collateral complex, popliteus tendon, and extensor mechanism intact.    Full-thickness chondral loss in the median patellar ridge with underlying subchondral marrow edema.  Articular cartilage in the medial and lateral compartments is fairly well preserved.  Trace joint effusion.  Tiny Baker's cyst.    No fracture or avascular necrosis or acute osteochondral lesion.  Impression: 1. Horizontal cleavage tear of the body and posterior horn medial meniscus.  2. Patellar chondromalacia with full-thickness chondral fissuring.  3. Trace joint effusion and tiny Baker cyst.    Electronically signed by: Jose Small MD  Date:    03/08/2023  Time:    09:24    ***  Relevant imaging results reviewed and interpreted by me, discussed with the patient and / or family today. ***    Other Tests:     ***    There are no Patient Instructions on file for this visit.  Provider Note/Medical Decision Making: ***      I discussed worrisome and red flag signs and symptoms with the patient. The patient expressed understanding and agreed to alert me immediately or to go to the emergency room if they experience any of these.   Treatment plan was developed with input from the patient/family, and they expressed understanding and agreement with the plan. All  questions were answered today.          Og Roberts MD  Orthopaedic Surgery & Sports Medicine       Disclaimer: This note was prepared using a voice recognition system and is likely to have sound alike errors within the text.

## 2023-08-31 LAB
ANION GAP SERPL CALC-SCNC: 11 MMOL/L (ref 8–16)
BUN SERPL-MCNC: 9 MG/DL (ref 6–20)
CALCIUM SERPL-MCNC: 9.4 MG/DL (ref 8.7–10.5)
CHLORIDE SERPL-SCNC: 103 MMOL/L (ref 95–110)
CO2 SERPL-SCNC: 25 MMOL/L (ref 23–29)
CREAT SERPL-MCNC: 0.9 MG/DL (ref 0.5–1.4)
EST. GFR  (NO RACE VARIABLE): >60 ML/MIN/1.73 M^2
GLUCOSE SERPL-MCNC: 79 MG/DL (ref 70–110)
POTASSIUM SERPL-SCNC: 4.4 MMOL/L (ref 3.5–5.1)
SODIUM SERPL-SCNC: 139 MMOL/L (ref 136–145)

## 2023-09-01 ENCOUNTER — TELEPHONE (OUTPATIENT)
Dept: SPORTS MEDICINE | Facility: CLINIC | Age: 48
End: 2023-09-01
Payer: COMMERCIAL

## 2023-09-01 NOTE — TELEPHONE ENCOUNTER
Patient wanted to move surgery to 10/10/23.      ----- Message from Sona Zepeda sent at 9/1/2023  8:50 AM CDT -----  Patient is requesting a call back to reschedule the procedure due to him not being able to get off work. Call back at [phone]

## 2023-09-08 ENCOUNTER — TELEPHONE (OUTPATIENT)
Dept: SPORTS MEDICINE | Facility: CLINIC | Age: 48
End: 2023-09-08
Payer: COMMERCIAL

## 2023-09-08 NOTE — TELEPHONE ENCOUNTER
Spoke with wife. Explained we do not have a specfic form for completion.     ----- Message from Shabnam Murdock sent at 9/8/2023 10:55 AM CDT -----  Contact: Rolando/wife    ----- Message -----  From: Bindu Krishnamurthy  Sent: 9/8/2023   8:06 AM CDT  To: Armando Foley Staff    Patients wife Rolando is calling to speak with the nurse to find out when patient should see his pcp prior to surgery. Please give Rolando a callback at [phone]

## 2023-09-12 DIAGNOSIS — S83.242D TEAR OF MEDIAL MENISCUS OF LEFT KNEE, CURRENT, UNSPECIFIED TEAR TYPE, SUBSEQUENT ENCOUNTER: Primary | ICD-10-CM

## 2023-09-18 ENCOUNTER — OFFICE VISIT (OUTPATIENT)
Dept: SPORTS MEDICINE | Facility: CLINIC | Age: 48
End: 2023-09-18
Payer: COMMERCIAL

## 2023-09-18 VITALS — BODY MASS INDEX: 45.1 KG/M2 | WEIGHT: 315 LBS | HEIGHT: 70 IN

## 2023-09-18 DIAGNOSIS — M17.12 LOCALIZED PRIMARY OSTEOARTHRITIS OF LEFT LOWER LEG: ICD-10-CM

## 2023-09-18 DIAGNOSIS — E66.01 CLASS 3 SEVERE OBESITY WITH BODY MASS INDEX (BMI) OF 45.0 TO 49.9 IN ADULT, UNSPECIFIED OBESITY TYPE, UNSPECIFIED WHETHER SERIOUS COMORBIDITY PRESENT: ICD-10-CM

## 2023-09-18 DIAGNOSIS — M94.262 CHONDROMALACIA, LEFT KNEE: ICD-10-CM

## 2023-09-18 DIAGNOSIS — M23.92 INTERNAL DERANGEMENT OF KNEE JOINT, LEFT: ICD-10-CM

## 2023-09-18 DIAGNOSIS — S83.242D TEAR OF MEDIAL MENISCUS OF LEFT KNEE, CURRENT, UNSPECIFIED TEAR TYPE, SUBSEQUENT ENCOUNTER: Primary | ICD-10-CM

## 2023-09-18 DIAGNOSIS — M17.12 PRIMARY OSTEOARTHRITIS OF LEFT KNEE: ICD-10-CM

## 2023-09-18 DIAGNOSIS — M25.562 CHRONIC PAIN OF LEFT KNEE: ICD-10-CM

## 2023-09-18 DIAGNOSIS — G89.29 CHRONIC PAIN OF LEFT KNEE: ICD-10-CM

## 2023-09-18 PROCEDURE — 3008F PR BODY MASS INDEX (BMI) DOCUMENTED: ICD-10-PCS | Mod: CPTII,S$GLB,, | Performed by: PHYSICIAN ASSISTANT

## 2023-09-18 PROCEDURE — 97110 THERAPEUTIC EXERCISES: CPT | Mod: GP,S$GLB,, | Performed by: PHYSICIAN ASSISTANT

## 2023-09-18 PROCEDURE — 99999 PR PBB SHADOW E&M-EST. PATIENT-LVL III: ICD-10-PCS | Mod: PBBFAC,,, | Performed by: PHYSICIAN ASSISTANT

## 2023-09-18 PROCEDURE — 1160F PR REVIEW ALL MEDS BY PRESCRIBER/CLIN PHARMACIST DOCUMENTED: ICD-10-PCS | Mod: CPTII,S$GLB,, | Performed by: PHYSICIAN ASSISTANT

## 2023-09-18 PROCEDURE — 99213 OFFICE O/P EST LOW 20 MIN: CPT | Mod: S$GLB,,, | Performed by: PHYSICIAN ASSISTANT

## 2023-09-18 PROCEDURE — 1159F PR MEDICATION LIST DOCUMENTED IN MEDICAL RECORD: ICD-10-PCS | Mod: CPTII,S$GLB,, | Performed by: PHYSICIAN ASSISTANT

## 2023-09-18 PROCEDURE — 1160F RVW MEDS BY RX/DR IN RCRD: CPT | Mod: CPTII,S$GLB,, | Performed by: PHYSICIAN ASSISTANT

## 2023-09-18 PROCEDURE — 3008F BODY MASS INDEX DOCD: CPT | Mod: CPTII,S$GLB,, | Performed by: PHYSICIAN ASSISTANT

## 2023-09-18 PROCEDURE — 1159F MED LIST DOCD IN RCRD: CPT | Mod: CPTII,S$GLB,, | Performed by: PHYSICIAN ASSISTANT

## 2023-09-18 PROCEDURE — 99213 PR OFFICE/OUTPT VISIT, EST, LEVL III, 20-29 MIN: ICD-10-PCS | Mod: S$GLB,,, | Performed by: PHYSICIAN ASSISTANT

## 2023-09-18 PROCEDURE — 97110 PR THERAPEUTIC EXERCISES: ICD-10-PCS | Mod: GP,S$GLB,, | Performed by: PHYSICIAN ASSISTANT

## 2023-09-18 PROCEDURE — 99999 PR PBB SHADOW E&M-EST. PATIENT-LVL III: CPT | Mod: PBBFAC,,, | Performed by: PHYSICIAN ASSISTANT

## 2023-09-18 NOTE — PATIENT INSTRUCTIONS
Assessment:  Pio Navarro is a  48 y.o. male    with a chief complaint of Pain of the Left Knee  Presents today for a recheck on left knee pain and left knee pre-op, he had an original injury at work in Dec 2022  Pre-op    Encounter Diagnoses   Name Primary?    Tear of medial meniscus of left knee, current, unspecified tear type, subsequent encounter Yes    Primary osteoarthritis of left knee     Chondromalacia, left knee     Class 3 severe obesity with body mass index (BMI) of 45.0 to 49.9 in adult, unspecified obesity type, unspecified whether serious comorbidity present     Chronic pain of left knee     Localized primary osteoarthritis of left lower leg     Internal derangement of knee joint, left       Plan:  Plan: Right knee arthroscopy, possible meniscectomy, any indicated procedures.      Knee Surgery Post-Operative Instructions     Og Roberts MD   [id number] [facility]  [place] [address]  Ph: [phone] Fax: [phone]    After you get home, apply ice to your knee but keep the bandages dry. You may apply ice?for 15-20 minutes every 1-2 hours for first week. Ice helps to reduce pain and?swelling. Never apply ice directly to the skin. If you are using a CryoCuff/PolarIce, it should be ice cold for no more than 15-20 minutes every 1-2 hours.     Elevate your leg on 2-3 pillows or rolled up towels placed under the heel so that the heel?is elevated higher than your knee. This will help reduce swelling and achieve full?extension of the knee.     It is important to get up and move around after your surgery. It's good for your lungs after anesthesia, and also good for your circulation to help prevent blood clots from developing.  However, too much walking will cause the knee to swell and hurt.     After 72 hours, you can remove the ACE wrap and bandages. You should then place new gauze/bandages and ACE wrap each day for 2 weeks.     You may shower, but the incisions, ACE bandages, and Brace  must not get wet until 72 hours after surgery and only if there is no drainage at all from the incisions. Do not soak the knee under water for 2 weeks.     Weight-Bearing Status: You are to be (weight bearing/ non-weight bearing) on your operative leg.? Range of motion:_______________    Take the pain medicine as needed. You may take up to 2 tablets every 4-6 hours if?needed. As the pain subsides try to increase the time between doses.      Your first post-operative check-up with Dr. Roberts 10-14 days from the?day of surgery.        It is normal to have some discomfort and swelling, as well as a small amount of blood-tinged drainage, following surgery. If this becomes severe, or if you develop a fever greater than or equal to?101 degrees, calf pain, or shortness of breath or chest pain, please call immediately. If?you have questions or problems, call the office at [phone].     NORMAL SENSATIONS AND FINDINGS AFTER SURGERY   Shin pain   Knee swelling and warmth up to 2 weeks   Small amounts of bloody drainage   Numbness around the incision area   Soreness and swelling in the back of the knee   Bruising to the lower leg   Lower leg swelling, including the ankle - if this occurs elevate the leg above the heart?and apply ice to the swollen areas.   Numbness to the foot if you had a nerve block - will resolve within a few days   Low grade temperature less than 101.5 - if this occurs drink plenty of fluids and cough?and deep breathe (take 10 breaths, on the last hold for a second then forcefully cough a?few times). A low grade temp is normal for a week after surgery   Small amount of redness to the area where the sutures insert in the skin  Low back discomfort due to the epidural / spinal anesthesia apply a heating pad as?needed      NOTIFY OUR OFFICE IMMEDIATELY AT [phone] IF ANY OF THE FOLLOWING SIGNS OR SYMPTOMS OCCUR:   Chest pain or shortness of breath   Change is noted to your incision (i.e. increased  redness or drainage)   Numbness of your foot if you didn't have a nerve block   Sharp pains in the back of your hip, thigh, or calf   Temperature greater than 101.5 degrees   Fever, chills, nausea, vomiting or diarrhea   Stitches loosen or fall out and incisions open up   Thick, foul-smelling drainage (yellow or greenish)   Increased pain which is not relieved by medications or other measures mentioned above       Knee & Quad Exercise Instructions     Og Roberts MD   [id number] [facility]  [place] [address]  Ph: [phone] Fax: [phone]       Exercises listed are to be performed by the patient following surgery. Perform sets of 10 repetitions, 4 times per day.        Heel Slides        Lie flat or sit with your leg straight. Slide your heel toward your hip. Try to get your knee bent to a 90° angle. Slide your heel back so your leg is straight then relax.       Knee Extension (Lying Down)      While lying down, rest your ankle on a towel roll so that your knee and calf are not touching the floor. Allow gravity to straighten your knee. Maintain this position for up to 10 minutes.       Knee Extension (Sitting in a Chair)      While sitting in a chair, prop your heel on another chair so that there is nothing behind your calf or knee. Allow gravity to straighten your knee. Maintain this position for up to 10 minute       Patellar Mobilization      This exercise is done by simply pushing the patella up and down and side to side and holding that position. Movement of the patella is essential when restoring range of motion. If the patella cannot move within the femoral groove, then the knee cannot bend and extend.?         Quadriceps Isometrics (Quad Sets)        Lie flat or sit with your surgical leg straight. Tighten the muscle in the front of your thigh as much as you can, pushing the back or your knee flat against the floor. Hold this tight for 5 seconds then relax.        Straight Leg Raises  (SLR)      Lie flat or sit with your leg straight and your knee brace on (if you have one). You may have your non-operative knee bent slightly for comfort. Perform a Quad set (as above) and flex your toes straight up. Lift your heel off of the floor and hold for at least 5 seconds. Keep your thigh muscle as tight as you can and lower your heel back down then relax.       Seated Knee Flexion        Sit with your legs dangling over the bed. Relax your leg allowing gravity to bend your knee. You may use your non-operative leg to gently push your operative leg into more of a bend. Maintain this position for up to 10 minutes.        Calf Pumps         Point and flex your toes to tighten your calf muscles.                   Follow-up:  or sooner if there are any problems between now and then.    Leave Review:   Google: Leave Google Review  Healthgrades: Leave Healthgrades Review    After Hours Number: [phone]

## 2023-09-18 NOTE — H&P (VIEW-ONLY)
Patient ID: Pio Navarro  YOB: 1975  MRN: [id number]    Chief Complaint: Pain of the Left Knee      Referred By: Dr. Tijerina    History of Present Illness: Pio Navarro is a  48 y.o. male    with a chief complaint of Pain of the Left Knee    Pio presents to the clinic today for a Pre-op on his Left knee. He rates his pain a 6 out of 10. Prolonged use makes the pain worse. Nothing helps the pain. He states it has been swelling and he has been using a wrap to help the swelling.    Previous Visit: 08/30/23  Patient presents today for left knee pain. Patient reports a pain level of 4 out of 10. He [facility] He feels it did strengthen his leg and knee but did not help with the pain.     He was initially evaluated in our office on 2/28/23 by Dr. Tijerina.  He had injured his knee in December 2022 when he felt a pop in his knee while rising from a squat position. An MRI was ordered which demonstrated a medial meniscus tear with patellar chondromalacia.  He was treated with US guided CSI, diclofenac 75mg, and PT at [facility].  He failed to improve and was subsequently treated with a left knee Durolane injection on 5/25/23.  He reports that after his Durolane injection he had worsening pain for about 10 days before returning to baseline.  He has noticed slight improvement in his limp but continues to have significant pain with weight-bearing activity and at night.  He also reports that he noticed some drainage from a spot below his injection site that resolved after a few days.  He is confident that it was not coming from the injection site.  He continues to have persistent swelling in the knee but no reports of fever, chills, night sweats or other signs of infection    HPI    Past Medical History:   Past Medical History:   Diagnosis Date    Hypertension      Past Surgical History:   Procedure Laterality Date    BACK SURGERY      HAND  SURGERY      HERNIA REPAIR  11/2022     Family History   Problem Relation Age of Onset    Cancer Mother     Cancer Father     Cancer Maternal Grandmother     Cancer Maternal Grandfather     Cancer Paternal Grandmother     Cancer Paternal Grandfather      Social History     Socioeconomic History    Marital status:    Tobacco Use    Smoking status: Unknown   Substance and Sexual Activity    Alcohol use: Yes     Comment: 12 beers a year    Drug use: Never    Sexual activity: Yes     Medication List with Changes/Refills   Current Medications    DICLOFENAC (VOLTAREN) 75 MG EC TABLET    Take 1 tablet (75 mg total) by mouth 2 (two) times daily as needed.    NAPROXEN (NAPROSYN) 500 MG TABLET    Take 1 tablet (500 mg total) by mouth 2 (two) times daily.    VALSARTAN-HYDROCHLOROTHIAZIDE (DIOVAN-HCT) 80-12.5 MG PER TABLET    Take by mouth.     Review of patient's allergies indicates:   Allergen Reactions    Egg Nausea And Vomiting     Review of Systems   Constitutional: Negative for chills and fever.   HENT:  Negative for sore throat.    Eyes:  Negative for pain.   Cardiovascular:  Negative for chest pain and leg swelling.   Respiratory:  Negative for cough and shortness of breath.    Skin:  Negative for itching and rash.   Musculoskeletal:  Positive for joint pain and joint swelling.   Gastrointestinal:  Negative for abdominal pain, nausea and vomiting.   Genitourinary:  Negative for dysuria.   Neurological:  Negative for dizziness, numbness and paresthesias.       Physical Exam:   Body mass index is 49.93 kg/m².  There were no vitals filed for this visit.   GENERAL: Well appearing, appropriate for stated age, no acute distress.  CARDIOVASCULAR: Pulses regular by peripheral palpation.  PULMONARY: Respirations are even and non-labored.  NEURO: Awake, alert, and oriented x 3.  PSYCH: Mood & affect are appropriate.  HEENT: Head is normocephalic and atraumatic.  Ortho/SPM Exam       Left Knee Exam      Inspection    Effusion: present     Tenderness   The patient tender to palpation of the medial joint line.     Crepitus   The patient has crepitus of the patella.     Tests   Meniscus   Rodriguez:  Medial - positive      Other   Sensation: normal     Comments:  Intact EHL, FHL, gastrocsoleus, and tibialis anterior. Sensation intact to light touch in superficial peroneal, deep peroneal, tibial, sural, and saphenous nerve distributions. Foot warm and well perfused with capillary refill of less than 2 seconds and palpable pedal pulses.        Muscle Strength   Left Lower Extremity   Hip Abduction: 5/5   Quadriceps:  5/5   Hamstrin/5      Vascular Exam         Left Pulses  Dorsalis Pedis:      2+  Posterior Tibial:      2+       Imaging:    MRI Knee Without Contrast Left  Narrative: EXAMINATION:  MRI KNEE WITHOUT CONTRAST LEFT    CLINICAL HISTORY:  Knee trauma, internal derangement suspected, xray done;Sprain of medial collateral ligament of left knee, initial encounter    TECHNIQUE:  Multiplanar, multisequence images were performed about the left knee.  No contrast was administered.    COMPARISON:  Left knee x-ray, 2023    FINDINGS:  Horizontal cleavage tear of the body and posterior horn of the medial meniscus exiting the undersurface.  Lateral meniscus intact.    ACL, PCL, medial collateral ligament, lateral collateral complex, popliteus tendon, and extensor mechanism intact.    Full-thickness chondral loss in the median patellar ridge with underlying subchondral marrow edema.  Articular cartilage in the medial and lateral compartments is fairly well preserved.  Trace joint effusion.  Tiny Baker's cyst.    No fracture or avascular necrosis or acute osteochondral lesion.  Impression: 1. Horizontal cleavage tear of the body and posterior horn medial meniscus.  2. Patellar chondromalacia with full-thickness chondral fissuring.  3. Trace joint effusion and tiny Baker cyst.    Electronically signed by: Jose Small  MD  Date:    03/08/2023  Time:    09:24      Relevant imaging results reviewed and interpreted by me, discussed with the patient and / or family today.     Other Tests:         Patient Instructions   Assessment:  Pio Navarro is a  48 y.o. male    with a chief complaint of Pain of the Left Knee  Presents today for a recheck on left knee pain and left knee pre-op, he had an original injury at work in Dec 2022  Pre-op    Encounter Diagnoses   Name Primary?    Tear of medial meniscus of left knee, current, unspecified tear type, subsequent encounter Yes    Primary osteoarthritis of left knee     Chondromalacia, left knee     Class 3 severe obesity with body mass index (BMI) of 45.0 to 49.9 in adult, unspecified obesity type, unspecified whether serious comorbidity present     Chronic pain of left knee     Localized primary osteoarthritis of left lower leg     Internal derangement of knee joint, left       Plan:  Plan: Right knee arthroscopy, possible meniscectomy, any indicated procedures.      Knee Surgery Post-Operative Instructions     Og Roberts MD   [address] [place] [address]  Ph: [phone] Fax: [phone]    After you get home, apply ice to your knee but keep the bandages dry. You may apply ice?for 15-20 minutes every 1-2 hours for first week. Ice helps to reduce pain and?swelling. Never apply ice directly to the skin. If you are using a CryoCuff/PolarIce, it should be ice cold for no more than 15-20 minutes every 1-2 hours.     Elevate your leg on 2-3 pillows or rolled up towels placed under the heel so that the heel?is elevated higher than your knee. This will help reduce swelling and achieve full?extension of the knee.     It is important to get up and move around after your surgery. It's good for your lungs after anesthesia, and also good for your circulation to help prevent blood clots from developing.  However, too much walking will cause the knee to swell and hurt.      After 72 hours, you can remove the ACE wrap and bandages. You should then place new gauze/bandages and ACE wrap each day for 2 weeks.     You may shower, but the incisions, ACE bandages, and Brace must not get wet until 72 hours after surgery and only if there is no drainage at all from the incisions. Do not soak the knee under water for 2 weeks.     Weight-Bearing Status: You are to be (weight bearing/ non-weight bearing) on your operative leg.? Range of motion:_______________    Take the pain medicine as needed. You may take up to 2 tablets every 4-6 hours if?needed. As the pain subsides try to increase the time between doses.      Your first post-operative check-up with Dr. Roberts 10-14 days from the?day of surgery.        It is normal to have some discomfort and swelling, as well as a small amount of blood-tinged drainage, following surgery. If this becomes severe, or if you develop a fever greater than or equal to?101 degrees, calf pain, or shortness of breath or chest pain, please call immediately. If?you have questions or problems, call the office at [phone].     NORMAL SENSATIONS AND FINDINGS AFTER SURGERY   Shin pain   Knee swelling and warmth up to 2 weeks   Small amounts of bloody drainage   Numbness around the incision area   Soreness and swelling in the back of the knee   Bruising to the lower leg   Lower leg swelling, including the ankle - if this occurs elevate the leg above the heart?and apply ice to the swollen areas.   Numbness to the foot if you had a nerve block - will resolve within a few days   Low grade temperature less than 101.5 - if this occurs drink plenty of fluids and cough?and deep breathe (take 10 breaths, on the last hold for a second then forcefully cough a?few times). A low grade temp is normal for a week after surgery   Small amount of redness to the area where the sutures insert in the skin  Low back discomfort due to the epidural / spinal anesthesia apply a heating pad  as?needed      NOTIFY OUR OFFICE IMMEDIATELY AT [phone] IF ANY OF THE FOLLOWING SIGNS OR SYMPTOMS OCCUR:   Chest pain or shortness of breath   Change is noted to your incision (i.e. increased redness or drainage)   Numbness of your foot if you didn't have a nerve block   Sharp pains in the back of your hip, thigh, or calf   Temperature greater than 101.5 degrees   Fever, chills, nausea, vomiting or diarrhea   Stitches loosen or fall out and incisions open up   Thick, foul-smelling drainage (yellow or greenish)   Increased pain which is not relieved by medications or other measures mentioned above       Knee & Quad Exercise Instructions     Og Roberts MD   [address] [facility] [place] [address]  Ph: [phone] Fax: [phone]       Exercises listed are to be performed by the patient following surgery. Perform sets of 10 repetitions, 4 times per day.        Heel Slides        Lie flat or sit with your leg straight. Slide your heel toward your hip. Try to get your knee bent to a 90° angle. Slide your heel back so your leg is straight then relax.       Knee Extension (Lying Down)      While lying down, rest your ankle on a towel roll so that your knee and calf are not touching the floor. Allow gravity to straighten your knee. Maintain this position for up to 10 minutes.       Knee Extension (Sitting in a Chair)      While sitting in a chair, prop your heel on another chair so that there is nothing behind your calf or knee. Allow gravity to straighten your knee. Maintain this position for up to 10 minute       Patellar Mobilization      This exercise is done by simply pushing the patella up and down and side to side and holding that position. Movement of the patella is essential when restoring range of motion. If the patella cannot move within the femoral groove, then the knee cannot bend and extend.?         Quadriceps Isometrics (Quad Sets)        Lie flat or sit with your surgical leg  straight. Tighten the muscle in the front of your thigh as much as you can, pushing the back or your knee flat against the floor. Hold this tight for 5 seconds then relax.        Straight Leg Raises (SLR)      Lie flat or sit with your leg straight and your knee brace on (if you have one). You may have your non-operative knee bent slightly for comfort. Perform a Quad set (as above) and flex your toes straight up. Lift your heel off of the floor and hold for at least 5 seconds. Keep your thigh muscle as tight as you can and lower your heel back down then relax.       Seated Knee Flexion        Sit with your legs dangling over the bed. Relax your leg allowing gravity to bend your knee. You may use your non-operative leg to gently push your operative leg into more of a bend. Maintain this position for up to 10 minutes.        Calf Pumps         Point and flex your toes to tighten your calf muscles.                   Follow-up:  or sooner if there are any problems between now and then.    Leave Review:   Google: Leave Google Review  Healthgrades: Leave Healthgrades Review    After Hours Number: [phone]      Provider Note/Medical Decision Making:   At least 10 minutes were spent developing, teaching, and performing a home exercise program.  A written summary was provided and all questions were answered. CPT 87011-ZC        I discussed worrisome and red flag signs and symptoms with the patient. The patient expressed understanding and agreed to alert me immediately or to go to the emergency room if they experience any of these.   Treatment plan was developed with input from the patient/family, and they expressed understanding and agreement with the plan. All questions were answered today.        Disclaimer: This note was prepared using a voice recognition system and is likely to have sound alike errors within the text.

## 2023-09-18 NOTE — PROGRESS NOTES
Patient ID: Pio Navarro  YOB: 1975  MRN: [id number]    Chief Complaint: Pain of the Left Knee      Referred By: Dr. Tijerina    History of Present Illness: Pio Navarro is a  48 y.o. male    with a chief complaint of Pain of the Left Knee    Pio presents to the clinic today for a Pre-op on his Left knee. He rates his pain a 6 out of 10. Prolonged use makes the pain worse. Nothing helps the pain. He states it has been swelling and he has been using a wrap to help the swelling.    Previous Visit: 08/30/23  Patient presents today for left knee pain. Patient reports a pain level of 4 out of 10. He just [facility]. He feels it did strengthen his leg and knee but did not help with the pain.     He was initially evaluated in our office on 2/28/23 by Dr. Tijerina.  He had injured his knee in December 2022 when he felt a pop in his knee while rising from a squat position. An MRI was ordered which demonstrated a medial meniscus tear with patellar chondromalacia.  He was treated with US guided CSI, diclofenac 75mg, and PT at [facility].  He failed to improve and was subsequently treated with a left knee Durolane injection on 5/25/23.  He reports that after his Durolane injection he had worsening pain for about 10 days before returning to baseline.  He has noticed slight improvement in his limp but continues to have significant pain with weight-bearing activity and at night.  He also reports that he noticed some drainage from a spot below his injection site that resolved after a few days.  He is confident that it was not coming from the injection site.  He continues to have persistent swelling in the knee but no reports of fever, chills, night sweats or other signs of infection    HPI    Past Medical History:   Past Medical History:   Diagnosis Date    Hypertension      Past Surgical History:   Procedure Laterality Date    BACK SURGERY      HAND  SURGERY      HERNIA REPAIR  11/2022     Family History   Problem Relation Age of Onset    Cancer Mother     Cancer Father     Cancer Maternal Grandmother     Cancer Maternal Grandfather     Cancer Paternal Grandmother     Cancer Paternal Grandfather      Social History     Socioeconomic History    Marital status:    Tobacco Use    Smoking status: Unknown   Substance and Sexual Activity    Alcohol use: Yes     Comment: 12 beers a year    Drug use: Never    Sexual activity: Yes     Medication List with Changes/Refills   Current Medications    DICLOFENAC (VOLTAREN) 75 MG EC TABLET    Take 1 tablet (75 mg total) by mouth 2 (two) times daily as needed.    NAPROXEN (NAPROSYN) 500 MG TABLET    Take 1 tablet (500 mg total) by mouth 2 (two) times daily.    VALSARTAN-HYDROCHLOROTHIAZIDE (DIOVAN-HCT) 80-12.5 MG PER TABLET    Take by mouth.     Review of patient's allergies indicates:   Allergen Reactions    Egg Nausea And Vomiting     Review of Systems   Constitutional: Negative for chills and fever.   HENT:  Negative for sore throat.    Eyes:  Negative for pain.   Cardiovascular:  Negative for chest pain and leg swelling.   Respiratory:  Negative for cough and shortness of breath.    Skin:  Negative for itching and rash.   Musculoskeletal:  Positive for joint pain and joint swelling.   Gastrointestinal:  Negative for abdominal pain, nausea and vomiting.   Genitourinary:  Negative for dysuria.   Neurological:  Negative for dizziness, numbness and paresthesias.       Physical Exam:   Body mass index is 49.93 kg/m².  There were no vitals filed for this visit.   GENERAL: Well appearing, appropriate for stated age, no acute distress.  CARDIOVASCULAR: Pulses regular by peripheral palpation.  PULMONARY: Respirations are even and non-labored.  NEURO: Awake, alert, and oriented x 3.  PSYCH: Mood & affect are appropriate.  HEENT: Head is normocephalic and atraumatic.  Ortho/SPM Exam       Left Knee Exam      Inspection    Effusion: present     Tenderness   The patient tender to palpation of the medial joint line.     Crepitus   The patient has crepitus of the patella.     Tests   Meniscus   Rodriguez:  Medial - positive      Other   Sensation: normal     Comments:  Intact EHL, FHL, gastrocsoleus, and tibialis anterior. Sensation intact to light touch in superficial peroneal, deep peroneal, tibial, sural, and saphenous nerve distributions. Foot warm and well perfused with capillary refill of less than 2 seconds and palpable pedal pulses.        Muscle Strength   Left Lower Extremity   Hip Abduction: 5/5   Quadriceps:  5/5   Hamstrin/5      Vascular Exam         Left Pulses  Dorsalis Pedis:      2+  Posterior Tibial:      2+       Imaging:    MRI Knee Without Contrast Left  Narrative: EXAMINATION:  MRI KNEE WITHOUT CONTRAST LEFT    CLINICAL HISTORY:  Knee trauma, internal derangement suspected, xray done;Sprain of medial collateral ligament of left knee, initial encounter    TECHNIQUE:  Multiplanar, multisequence images were performed about the left knee.  No contrast was administered.    COMPARISON:  Left knee x-ray, 2023    FINDINGS:  Horizontal cleavage tear of the body and posterior horn of the medial meniscus exiting the undersurface.  Lateral meniscus intact.    ACL, PCL, medial collateral ligament, lateral collateral complex, popliteus tendon, and extensor mechanism intact.    Full-thickness chondral loss in the median patellar ridge with underlying subchondral marrow edema.  Articular cartilage in the medial and lateral compartments is fairly well preserved.  Trace joint effusion.  Tiny Baker's cyst.    No fracture or avascular necrosis or acute osteochondral lesion.  Impression: 1. Horizontal cleavage tear of the body and posterior horn medial meniscus.  2. Patellar chondromalacia with full-thickness chondral fissuring.  3. Trace joint effusion and tiny Baker cyst.    Electronically signed by: Jose Small  MD  Date:    03/08/2023  Time:    09:24      Relevant imaging results reviewed and interpreted by me, discussed with the patient and / or family today.     Other Tests:         Patient Instructions   Assessment:  Pio Navarro is a  48 y.o. male    with a chief complaint of Pain of the Left Knee  Presents today for a recheck on left knee pain and left knee pre-op, he had an original injury at work in Dec 2022  Pre-op    Encounter Diagnoses   Name Primary?    Tear of medial meniscus of left knee, current, unspecified tear type, subsequent encounter Yes    Primary osteoarthritis of left knee     Chondromalacia, left knee     Class 3 severe obesity with body mass index (BMI) of 45.0 to 49.9 in adult, unspecified obesity type, unspecified whether serious comorbidity present     Chronic pain of left knee     Localized primary osteoarthritis of left lower leg     Internal derangement of knee joint, left       Plan:  Plan: Right knee arthroscopy, possible meniscectomy, any indicated procedures.      Knee Surgery Post-Operative Instructions     Og Roberts MD   [address] [place] [address]  Ph: [phone] Fax: [phone]    After you get home, apply ice to your knee but keep the bandages dry. You may apply ice?for 15-20 minutes every 1-2 hours for first week. Ice helps to reduce pain and?swelling. Never apply ice directly to the skin. If you are using a CryoCuff/PolarIce, it should be ice cold for no more than 15-20 minutes every 1-2 hours.     Elevate your leg on 2-3 pillows or rolled up towels placed under the heel so that the heel?is elevated higher than your knee. This will help reduce swelling and achieve full?extension of the knee.     It is important to get up and move around after your surgery. It's good for your lungs after anesthesia, and also good for your circulation to help prevent blood clots from developing.  However, too much walking will cause the knee to swell and hurt.      After 72 hours, you can remove the ACE wrap and bandages. You should then place new gauze/bandages and ACE wrap each day for 2 weeks.     You may shower, but the incisions, ACE bandages, and Brace must not get wet until 72 hours after surgery and only if there is no drainage at all from the incisions. Do not soak the knee under water for 2 weeks.     Weight-Bearing Status: You are to be (weight bearing/ non-weight bearing) on your operative leg.? Range of motion:_______________    Take the pain medicine as needed. You may take up to 2 tablets every 4-6 hours if?needed. As the pain subsides try to increase the time between doses.      Your first post-operative check-up with Dr. Roberts 10-14 days from the?day of surgery.        It is normal to have some discomfort and swelling, as well as a small amount of blood-tinged drainage, following surgery. If this becomes severe, or if you develop a fever greater than or equal to?101 degrees, calf pain, or shortness of breath or chest pain, please call immediately. If?you have questions or problems, call the office at [phone].     NORMAL SENSATIONS AND FINDINGS AFTER SURGERY   Shin pain   Knee swelling and warmth up to 2 weeks   Small amounts of bloody drainage   Numbness around the incision area   Soreness and swelling in the back of the knee   Bruising to the lower leg   Lower leg swelling, including the ankle - if this occurs elevate the leg above the heart?and apply ice to the swollen areas.   Numbness to the foot if you had a nerve block - will resolve within a few days   Low grade temperature less than 101.5 - if this occurs drink plenty of fluids and cough?and deep breathe (take 10 breaths, on the last hold for a second then forcefully cough a?few times). A low grade temp is normal for a week after surgery   Small amount of redness to the area where the sutures insert in the skin  Low back discomfort due to the epidural / spinal anesthesia apply a heating pad  as?needed      NOTIFY OUR OFFICE IMMEDIATELY AT [phone] IF ANY OF THE FOLLOWING SIGNS OR SYMPTOMS OCCUR:   Chest pain or shortness of breath   Change is noted to your incision (i.e. increased redness or drainage)   Numbness of your foot if you didn't have a nerve block   Sharp pains in the back of your hip, thigh, or calf   Temperature greater than 101.5 degrees   Fever, chills, nausea, vomiting or diarrhea   Stitches loosen or fall out and incisions open up   Thick, foul-smelling drainage (yellow or greenish)   Increased pain which is not relieved by medications or other measures mentioned above       Knee & Quad Exercise Instructions     Og Roberts MD   [address] [facility] [address] [place] [address]  Ph: [phone] Fax: [phone]       Exercises listed are to be performed by the patient following surgery. Perform sets of 10 repetitions, 4 times per day.        Heel Slides        Lie flat or sit with your leg straight. Slide your heel toward your hip. Try to get your knee bent to a 90° angle. Slide your heel back so your leg is straight then relax.       Knee Extension (Lying Down)      While lying down, rest your ankle on a towel roll so that your knee and calf are not touching the floor. Allow gravity to straighten your knee. Maintain this position for up to 10 minutes.       Knee Extension (Sitting in a Chair)      While sitting in a chair, prop your heel on another chair so that there is nothing behind your calf or knee. Allow gravity to straighten your knee. Maintain this position for up to 10 minute       Patellar Mobilization      This exercise is done by simply pushing the patella up and down and side to side and holding that position. Movement of the patella is essential when restoring range of motion. If the patella cannot move within the femoral groove, then the knee cannot bend and extend.?         Quadriceps Isometrics (Quad Sets)        Lie flat or sit with your surgical leg  straight. Tighten the muscle in the front of your thigh as much as you can, pushing the back or your knee flat against the floor. Hold this tight for 5 seconds then relax.        Straight Leg Raises (SLR)      Lie flat or sit with your leg straight and your knee brace on (if you have one). You may have your non-operative knee bent slightly for comfort. Perform a Quad set (as above) and flex your toes straight up. Lift your heel off of the floor and hold for at least 5 seconds. Keep your thigh muscle as tight as you can and lower your heel back down then relax.       Seated Knee Flexion        Sit with your legs dangling over the bed. Relax your leg allowing gravity to bend your knee. You may use your non-operative leg to gently push your operative leg into more of a bend. Maintain this position for up to 10 minutes.        Calf Pumps         Point and flex your toes to tighten your calf muscles.                   Follow-up:  or sooner if there are any problems between now and then.    Leave Review:   Google: Leave Google Review  Healthgrades: Leave Healthgrades Review    After Hours Number: [phone]      Provider Note/Medical Decision Making:   At least 10 minutes were spent developing, teaching, and performing a home exercise program.  A written summary was provided and all questions were answered. CPT 81826-OU        I discussed worrisome and red flag signs and symptoms with the patient. The patient expressed understanding and agreed to alert me immediately or to go to the emergency room if they experience any of these.   Treatment plan was developed with input from the patient/family, and they expressed understanding and agreement with the plan. All questions were answered today.        Disclaimer: This note was prepared using a voice recognition system and is likely to have sound alike errors within the text.

## 2023-09-19 ENCOUNTER — ANESTHESIA EVENT (OUTPATIENT)
Dept: SURGERY | Facility: HOSPITAL | Age: 48
End: 2023-09-19
Payer: OTHER MISCELLANEOUS

## 2023-09-26 ENCOUNTER — TELEPHONE (OUTPATIENT)
Dept: INTERNAL MEDICINE | Facility: CLINIC | Age: 48
End: 2023-09-26
Payer: COMMERCIAL

## 2023-09-26 ENCOUNTER — PATIENT MESSAGE (OUTPATIENT)
Dept: SPORTS MEDICINE | Facility: CLINIC | Age: 48
End: 2023-09-26
Payer: COMMERCIAL

## 2023-09-26 ENCOUNTER — TELEPHONE (OUTPATIENT)
Dept: SPORTS MEDICINE | Facility: CLINIC | Age: 48
End: 2023-09-26
Payer: COMMERCIAL

## 2023-09-26 NOTE — TELEPHONE ENCOUNTER
Attempted to contact patient in regards to surgery. Voicemail box not set up.     ----- Message from Liyah Millard sent at 9/26/2023  9:57 AM CDT -----    ----- Message -----  From: Jazzmine Gilbert PA-C  Sent: 9/26/2023   9:50 AM CDT  To: Armando Sotomayor    Good morning,  This patient's BMI is over 50.  He had a weight of 353# at his primary care doctors on 09/11.  Height is 70 in back if so BMI of 50.64.  I discussed his BMI with Dr. Roberts, he would like delay his knee scope and for the patient to lose some weight prior to surgery so we can have his surgery at the [facility] .   Thank you

## 2023-09-26 NOTE — TELEPHONE ENCOUNTER
Patient with weight of 353 per PCP clearance note.  On September 11th.  Discussed patient's BMI with Dr. Roberts.  The patient has a BMI of greater than 50.  He would need to be moved to the [facility] location.  Dr. Roberts would prefer that the patient delay his surgery and decrease his body weight prior to coming in for surgery.  Dr. Roberts's  staff will reach out to the patient.

## 2023-09-29 DIAGNOSIS — Z01.818 PRE-OP EXAM: Primary | ICD-10-CM

## 2023-10-04 ENCOUNTER — OFFICE VISIT (OUTPATIENT)
Dept: INTERNAL MEDICINE | Facility: CLINIC | Age: 48
End: 2023-10-04
Payer: OTHER MISCELLANEOUS

## 2023-10-04 ENCOUNTER — LAB VISIT (OUTPATIENT)
Dept: LAB | Facility: HOSPITAL | Age: 48
End: 2023-10-04
Attending: PHYSICIAN ASSISTANT
Payer: OTHER MISCELLANEOUS

## 2023-10-04 VITALS
BODY MASS INDEX: 48.81 KG/M2 | DIASTOLIC BLOOD PRESSURE: 75 MMHG | HEART RATE: 70 BPM | RESPIRATION RATE: 18 BRPM | SYSTOLIC BLOOD PRESSURE: 126 MMHG | TEMPERATURE: 98 F | OXYGEN SATURATION: 97 % | WEIGHT: 315 LBS

## 2023-10-04 DIAGNOSIS — Z01.818 PRE-OP EXAM: ICD-10-CM

## 2023-10-04 DIAGNOSIS — R73.03 PRE-DIABETES: ICD-10-CM

## 2023-10-04 DIAGNOSIS — I10 HYPERTENSION, UNSPECIFIED TYPE: ICD-10-CM

## 2023-10-04 DIAGNOSIS — M25.569 LATERAL KNEE PAIN, UNSPECIFIED LATERALITY: Primary | ICD-10-CM

## 2023-10-04 PROBLEM — G89.29 CHRONIC BACK PAIN: Status: ACTIVE | Noted: 2023-10-04

## 2023-10-04 PROBLEM — M25.562 LEFT KNEE PAIN: Status: ACTIVE | Noted: 2023-10-04

## 2023-10-04 PROBLEM — M54.9 CHRONIC BACK PAIN: Status: ACTIVE | Noted: 2023-10-04

## 2023-10-04 LAB
ESTIMATED AVG GLUCOSE: 126 MG/DL (ref 68–131)
HBA1C MFR BLD: 6 % (ref 4–5.6)

## 2023-10-04 PROCEDURE — 83036 HEMOGLOBIN GLYCOSYLATED A1C: CPT | Performed by: PHYSICIAN ASSISTANT

## 2023-10-04 PROCEDURE — 99499 NO LOS: ICD-10-PCS | Mod: S$GLB,,, | Performed by: ANESTHESIOLOGY

## 2023-10-04 PROCEDURE — 99999 PR PBB SHADOW E&M-EST. PATIENT-LVL IV: ICD-10-PCS | Mod: PBBFAC,,,

## 2023-10-04 PROCEDURE — 99999 PR PBB SHADOW E&M-EST. PATIENT-LVL IV: CPT | Mod: PBBFAC,,,

## 2023-10-04 PROCEDURE — 99499 UNLISTED E&M SERVICE: CPT | Mod: S$GLB,,, | Performed by: ANESTHESIOLOGY

## 2023-10-04 PROCEDURE — 36415 COLL VENOUS BLD VENIPUNCTURE: CPT | Performed by: PHYSICIAN ASSISTANT

## 2023-10-04 RX ORDER — METFORMIN HYDROCHLORIDE 500 MG/1
500 TABLET ORAL 2 TIMES DAILY WITH MEALS
COMMUNITY

## 2023-10-04 NOTE — ASSESSMENT & PLAN NOTE
Patient with BMI of 48.81, patient weighed in preop today  - congratulated patient on recent weight loss  - continue with home diet modifications and stationary bike as tolerated

## 2023-10-04 NOTE — DISCHARGE INSTRUCTIONS
To confirm, your doctor has instructed you: Surgery is scheduled for 10/10/2023.     Pre admit office will call the afternoon prior to surgery between 1PM and 3PM with arrival time.    Surgery will be at [facility],  The address is [address]. [place] [address].      IMPORTANT INSTRUCTIONS!    Do not eat or drink after 12 midnight, including water. Do not smoke or use chewing tobacco after 12 midnight  OK to brush teeth, but no gum, candy, or mints!      *Take only these medicines with a small swallow of water-morning of surgery*     none       ____ Stop Aspirin, Ibuprofen, Motrin and Aleve at least 5-7 days before surgery, unless otherwise instructed by your doctor, or the nurse.   You MAY use Tylenol/acetaminophen until day of surgery.      ____  If you take diabetic medication, do NOT take morning of surgery unless instructed by Doctor. Metformin must be stopped 24 hrs prior to surgery time.       ____ Stop taking any Fish Oil supplements or Vitamins at least 5 days prior to surgery, unless instructed otherwise by your Doctor.       Please notify MD office if you have an active infection, currently taking antibiotics or received a vaccination within the past 7 days.    You may be required to provide a urine sample prior to procedure;   Please ask  for a specimen cup if you need to use the restroom prior to being called into pre-op.    Bathing Instructions: The night before surgery and the morning prior to coming to the hospital:    - Shower & rinse your body as usual with anti-bacterial Soap (Dial or Lever 2000)   -Hibiclens (if indicated) use AFTER anti-bacterial soap; 1 packet PM/1 packet in AM on surgical site only   -Do not use hibiclens on your head, face, or genitals.    -Do not wash with anti-bacterial soap after you use the hibiclens.    -Do not shave surgical site 5-7 days prior to surgery.    -Pubic hair 7 days prior to surgery (gyn pt's).      Pediatric patients do not  need to use anti-bacterial soap or Hibiclens.             After Bathing:   __ No powder, lotions, creams, or body spray to skin     __No deodorant for any breast procedure, PORT, or upper arm surgery     __ No makeup, mascara, nail polish or artificial nails       **SURGERY WILL BE CANCELLED IF ARTIFICIAL/NAIL POLISH IS PRESENT!!!**    __ Please remove all piercings and leave all jewelry at home.    **SURGERY WILL BE CANCELLED IF PIERCINGS ARE PRESENT!!!**      __ Dentures, Hearing Aids and Contact Lens need to be removed prior to the start of surgery.      __ Wear clean, loose-fitting clothing. Allow for dressings/bandages/surgical equipment     __ You must have transportation, and they MUST stay the entire time.         [facility] Visitor/Ride Policy:   Only 1 adult allowed (over the age of 18) to accompany you and MUST STAY through the entire length of admission.     -Must have a ride home from a responsible adult that you know and trust.    -Medical Transport, Uber or Lyft can only be used if patient has a responsible adult to accompany them during ride home.  Pediatric patients are encouraged to have 2 adults accompany them to the surgery center.     ~Your ride MUST STAY the entire time until you are discharged~        Post-Op Instructions: You will receive surgery post-op instructions by your Discharge Nurse prior to going home.     Surgical Site Infection:   Prevention of surgical site infections:   -Keep incisions clean and dry.   -Do not soak/submerge incisions in water until completely healed.   -Do not apply lotions, powders, creams, or deodorants to site.   -Always make sure hands are cleaned with antibacterial soap/ alcohol-based  prior to touching the surgical site.       Signs and symptoms:               -Redness and pain around the area where you had surgery               -Drainage of cloudy fluid from your surgical wound               -Fever over 100.4 or chills     >>>Call Surgeon  office/on-call Surgeon if you experience any of these signs & symptoms post-surgery @ [phone].       *Please Call [facility] Pre-Admit Department for surgery instruction questions:  [phone]    *Payment questions:  [phone]    *Billing questions:  [phone]

## 2023-10-04 NOTE — PROGRESS NOTES
Preoperative History and Physical  [facility]                                                                   Chief Complaint: Preoperative evaluation     History of Present Illness:      Pio Navarro is a 48 y.o. male who presents to the office today for a preoperative consultation at the request of Dr. Roberts who plans on performing L knee arthroscopy with possible meniscotomy  on October 10.     Functional Status:      The patient is able to climb a flight of stairs slowly . The patient is able without assistive devices .The patient's functional status is not affected by the surgical problem. The patient's functional status is not affected by shortness of breath, chest pain, dyspnea on exertion and fatigue.  Rides stationary bike 15-20 min 2-3 x weekly no CP no SOB . On feet throughtou the day.   MET score greater than 4    Patient Anesthesia History:      History of Malignant Hyperthermia: no  History of Pseudocholinesterase Deficiency: no  History PONV: no  History of difficult intubation: no  History of delayed emergence: no    Family Anesthesia History:      History of Malignant Hyperthermia: no  History of Pseudocholinesterase Deficiency: no     Past Medical History:      Past Medical History:   Diagnosis Date    Hypertension     Pre-diabetes         Past Surgical History:      Past Surgical History:   Procedure Laterality Date    BACK SURGERY      HAND SURGERY      HERNIA REPAIR  11/2022        Social History:      Social History     Socioeconomic History    Marital status:    Tobacco Use    Smoking status: Unknown   Substance and Sexual Activity    Alcohol use: Yes     Comment: 12 beers a year    Drug use: Never    Sexual activity: Yes        Family History:      Family History   Problem Relation Age of Onset    Cancer Mother     Cancer Father     No Known Problems Sister     No Known Problems Sister     Cancer Maternal Grandmother     Cancer  Maternal Grandfather     Cancer Paternal Grandmother     Cancer Paternal Grandfather        Allergies:      Review of patient's allergies indicates:   Allergen Reactions    Egg Nausea And Vomiting       Medications:      Current Outpatient Medications   Medication Sig    metFORMIN (GLUCOPHAGE) 500 MG tablet Take 500 mg by mouth 2 (two) times daily with meals.    diclofenac (VOLTAREN) 75 MG EC tablet Take 1 tablet (75 mg total) by mouth 2 (two) times daily as needed. (Patient not taking: Reported on 9/18/2023)    naproxen (NAPROSYN) 500 MG tablet Take 1 tablet (500 mg total) by mouth 2 (two) times daily. (Patient not taking: Reported on 9/18/2023)    valsartan-hydrochlorothiazide (DIOVAN-HCT) 80-12.5 mg per tablet Take by mouth.     No current facility-administered medications for this visit.       Vitals:      Vitals:    10/04/23 1403   BP: 126/75   Pulse: 70   Resp: 18   Temp: 98 °F (36.7 °C)       Review of Systems:        Constitutional: Negative for fever, chills, weight loss, malaise/fatigue and diaphoresis.   HENT: Negative for hearing loss, ear pain, nosebleeds, congestion, sore throat, neck pain, tinnitus and ear discharge.    Eyes: Negative for blurred vision, double vision, photophobia, pain, discharge and redness.   Respiratory: Negative for cough, hemoptysis, sputum production, shortness of breath, wheezing and stridor.    Cardiovascular: Negative for chest pain, palpitations, orthopnea, claudication, leg swelling and PND. L knee swelling   Gastrointestinal: Negative for heartburn, nausea, vomiting, abdominal pain, diarrhea, constipation, blood in stool and melena.   Genitourinary: Negative for dysuria, urgency, frequency, hematuria and flank pain.   Musculoskeletal: Negative for myalgias, back pain, and falls. L knee pain   Skin: Negative for itching and rash.   Neurological: Negative for dizziness, tingling, tremors, sensory change, speech change, focal weakness, seizures, loss of consciousness,  "weakness and headaches.   Endo/Heme/Allergies: Negative for environmental allergies and polydipsia. Does not bruise/bleed easily.   Psychiatric/Behavioral: Negative for depression  Physical Exam:      Constitutional: Appears well-developed, well-nourished and in no acute distress.  Patient is oriented to person, place, and time.   Head: Normocephalic and atraumatic. Mucous membranes moist.  Neck: Neck supple no mass.   Cardiovascular: Normal rate   Pulmonary/Chest:  No respiratory distress.   Abdomen: non-distended. Bowel sounds are normal.   Neurological: Patient is alert and oriented to person, place and time. Moves all extremities.  Skin: Warm and dry. No lesions.  Extremities: No clubbing, cyanosis or edema.    Laboratory data:      Reviewed and noted in plan where applicable. Please see chart for full laboratory data.      Lab Results   Component Value Date    INR 1.0 08/30/2023       Lab Results   Component Value Date    WBC 5.34 08/30/2023    HGB 15.4 08/30/2023    HCT 46.5 08/30/2023    MCV 88 08/30/2023     08/30/2023     Predictors of intubation difficulty:       Morbid obesity? BMI 48.81 weighed pt in PAT. Verified height of 5'10" and weighed pt on scale 154.3 kg    Anatomically abnormal facies? no   Prominent incisors? no   Receding mandible? no   Short, thick neck? yes - thick neck    Neck range of motion: normal   Dentition:  intact  Based on the Modified Mallampati, patient is a mallampati score: I (soft palate, uvula, fauces, and tonsillar pillars visible)    Cardiographics:      ECG:  eval with cards in may will request records   Echocardiogram:  requested if available     Imaging:      Chest x-ray:  none      Assessment and Plan:    Pre-op exam  Patient presents at the request of Dr. Robetrs who plans on performing a left knee scope with possible Meniscectomy on  October 10th.  Known risk factors for perioperative complications: BMI 48.81    Difficulty with intubation is not anticipated.  No " airway history for review.  Patient has entire uvula visible upon exam, denies difficult airway with inguinal hernia repair in November at outside facility    Cardiac Risk Estimation:  Per Revised Cardiac Risk Index patient is a Class I  risk with a 3.9% risk of a major cardiac event.      1.) Preoperative workup as follows: ECG, hemoglobin, hematocrit, electrolytes, creatinine, glucose, liver function studies.  2.) Change in medication regimen before surgery: Hold NSAIDs 7 days preop, no metformin 24 hours prior to surgery.  3.) Prophylaxis for cardiac events with perioperative beta-blockers: not indicated.  4.) Invasive hemodynamic monitoring perioperatively: not indicated.  5.) Deep vein thrombosis prophylaxis postoperatively: intermittent pneumatic compression boots and regimen to be chosen by surgical team.  6.) Surveillance for postoperative MI with ECG immediately postoperatively and on postoperati ve days 1 and 2 AND troponin levels 24 hours postoperatively and on day 4 or hospital discharge (whichever comes first): not indicated.  7.) Current medications which may produce withdrawal symptoms if withheld perioperatively: none  8.) Other measures: Patient was evaluated by primary care and cleared at low risk for surgery .  Patient had cardiac workup including stress test which he reports as normal in May of this year.  Stress, echo, EKG in no requested from Dr. Shultz's office     Left knee pain  Plans for knee scope  Pre-diabetes  - A1c pending   - on metformin, hold 24 hours pre op  - keep follow up with PCP     HTN (hypertension)  - well  controlled  - continue home medications  - keep follow-up with primary care    Chronic back pain  Spinal cord stimulator in place, functioning  -patient will bring remote with him morning of surgery to turn off during surgery    BMI 45.0-49.9, adult  Patient with BMI of 48.81, patient weighed in preop today  - congratulated patient on recent weight loss  - continue with  home diet modifications and stationary bike as tolerated

## 2023-10-04 NOTE — PRE-PROCEDURE INSTRUCTIONS
To confirm, your doctor has instructed you: Surgery is scheduled for 10/10/2023.     Pre admit office will call the afternoon prior to surgery between 1PM and 3PM with arrival time.    Surgery will be at [facility],  The address is [address]. [place] [address].      IMPORTANT INSTRUCTIONS!    Do not eat or drink after 12 midnight, including water. Do not smoke or use chewing tobacco after 12 midnight  OK to brush teeth, but no gum, candy, or mints!      *Take only these medicines with a small swallow of water-morning of surgery*     none       ____ Stop Aspirin, Ibuprofen, Motrin and Aleve at least 5-7 days before surgery, unless otherwise instructed by your doctor, or the nurse.   You MAY use Tylenol/acetaminophen until day of surgery.      ____  If you take diabetic medication, do NOT take morning of surgery unless instructed by Doctor. Metformin must be stopped 24 hrs prior to surgery time.       ____ Stop taking any Fish Oil supplements or Vitamins at least 5 days prior to surgery, unless instructed otherwise by your Doctor.       Please notify MD office if you have an active infection, currently taking antibiotics or received a vaccination within the past 7 days.    You may be required to provide a urine sample prior to procedure;   Please ask  for a specimen cup if you need to use the restroom prior to being called into pre-op.    Bathing Instructions: The night before surgery and the morning prior to coming to the hospital:    - Shower & rinse your body as usual with anti-bacterial Soap (Dial or Lever 2000)   -Hibiclens (if indicated) use AFTER anti-bacterial soap; 1 packet PM/1 packet in AM on surgical site only   -Do not use hibiclens on your head, face, or genitals.    -Do not wash with anti-bacterial soap after you use the hibiclens.    -Do not shave surgical site 5-7 days prior to surgery.    -Pubic hair 7 days prior to surgery (gyn pt's).      Pediatric patients do not  need to use anti-bacterial soap or Hibiclens.             After Bathing:   __ No powder, lotions, creams, or body spray to skin     __No deodorant for any breast procedure, PORT, or upper arm surgery     __ No makeup, mascara, nail polish or artificial nails       **SURGERY WILL BE CANCELLED IF ARTIFICIAL/NAIL POLISH IS PRESENT!!!**    __ Please remove all piercings and leave all jewelry at home.    **SURGERY WILL BE CANCELLED IF PIERCINGS ARE PRESENT!!!**      __ Dentures, Hearing Aids and Contact Lens need to be removed prior to the start of surgery.      __ Wear clean, loose-fitting clothing. Allow for dressings/bandages/surgical equipment     __ You must have transportation, and they MUST stay the entire time.         [facility] Visitor/Ride Policy:   Only 1 adult allowed (over the age of 18) to accompany you and MUST STAY through the entire length of admission.     -Must have a ride home from a responsible adult that you know and trust.    -Medical Transport, Uber or Lyft can only be used if patient has a responsible adult to accompany them during ride home.  Pediatric patients are encouraged to have 2 adults accompany them to the surgery center.     ~Your ride MUST STAY the entire time until you are discharged~        Post-Op Instructions: You will receive surgery post-op instructions by your Discharge Nurse prior to going home.     Surgical Site Infection:   Prevention of surgical site infections:   -Keep incisions clean and dry.   -Do not soak/submerge incisions in water until completely healed.   -Do not apply lotions, powders, creams, or deodorants to site.   -Always make sure hands are cleaned with antibacterial soap/ alcohol-based  prior to touching the surgical site.       Signs and symptoms:               -Redness and pain around the area where you had surgery               -Drainage of cloudy fluid from your surgical wound               -Fever over 100.4 or chills     >>>Call Surgeon  office/on-call Surgeon if you experience any of these signs & symptoms post-surgery @ [phone].       *Please Call [facility] Pre-Admit Department for surgery instruction questions:  [phone]    *Payment questions:  [phone]    *Billing questions:  [phone]

## 2023-10-04 NOTE — ASSESSMENT & PLAN NOTE
Patient presents at the request of Dr. Roberts who plans on performing a left knee scope with possible Meniscectomy on  October 10th.  Known risk factors for perioperative complications: BMI 48.81    Difficulty with intubation is not anticipated.  No airway history for review.  Patient has entire uvula visible upon exam, denies difficult airway with inguinal hernia repair in November at outside facility    Cardiac Risk Estimation:  Per Revised Cardiac Risk Index patient is a Class I  risk with a 3.9% risk of a major cardiac event.      1.) Preoperative workup as follows: ECG, hemoglobin, hematocrit, electrolytes, creatinine, glucose, liver function studies.  2.) Change in medication regimen before surgery: Hold NSAIDs 7 days preop, no metformin 24 hours prior to surgery.  3.) Prophylaxis for cardiac events with perioperative beta-blockers: not indicated.  4.) Invasive hemodynamic monitoring perioperatively: not indicated.  5.) Deep vein thrombosis prophylaxis postoperatively: intermittent pneumatic compression boots and regimen to be chosen by surgical team.  6.) Surveillance for postoperative MI with ECG immediately postoperatively and on postoperati ve days 1 and 2 AND troponin levels 24 hours postoperatively and on day 4 or hospital discharge (whichever comes first): not indicated.  7.) Current medications which may produce withdrawal symptoms if withheld perioperatively: none  8.) Other measures: Patient was evaluated by primary care and cleared at low risk for surgery .  Patient had cardiac workup including stress test which he reports as normal in May of this year.  Stress, echo, EKG in no requested from Dr. Shultz's office

## 2023-10-04 NOTE — ASSESSMENT & PLAN NOTE
Spinal cord stimulator in place, functioning  -patient will bring remote with him morning of surgery to turn off during surgery

## 2023-10-05 DIAGNOSIS — S83.242D TEAR OF MEDIAL MENISCUS OF LEFT KNEE, CURRENT, UNSPECIFIED TEAR TYPE, SUBSEQUENT ENCOUNTER: ICD-10-CM

## 2023-10-05 DIAGNOSIS — M17.12 PRIMARY OSTEOARTHRITIS OF LEFT KNEE: Primary | ICD-10-CM

## 2023-10-09 ENCOUNTER — TELEPHONE (OUTPATIENT)
Dept: PREADMISSION TESTING | Facility: HOSPITAL | Age: 48
End: 2023-10-09
Payer: COMMERCIAL

## 2023-10-09 NOTE — ANESTHESIA PREPROCEDURE EVALUATION
10/09/2023  Pio Navarro is a 48 y.o., male.  Past Medical History:   Diagnosis Date    Hypertension     Pre-diabetes      Past Surgical History:   Procedure Laterality Date    BACK SURGERY      HAND SURGERY      HERNIA REPAIR  11/2022         Pre-op Assessment    I have reviewed the Patient Summary Reports.     I have reviewed the Nursing Notes. I have reviewed the NPO Status.   I have reviewed the Medications.     Review of Systems  Anesthesia Hx:  No problems with previous Anesthesia  History of prior surgery of interest to airway management or planning: Previous anesthesia: General Denies Family Hx of Anesthesia complications.   Denies Personal Hx of Anesthesia complications.   Social:  Non-Smoker    Hematology/Oncology:  Hematology Normal        Cardiovascular:   Hypertension    Pulmonary:  Pulmonary Normal    Renal/:  Renal/ Normal     Hepatic/GI:  Hepatic/GI Normal    Musculoskeletal:   Arthritis     Neurological:  Neurology Normal    Endocrine:   Pre-diabetes. Morbid Obesity / BMI > 40  Psych:  Psychiatric Normal           Physical Exam  General: Alert and Oriented    Airway:  Mallampati: II   Mouth Opening: Normal  TM Distance: Normal  Tongue: Normal  Neck ROM: Normal ROM  Neck: Girth Increased    Dental:  Intact    Chest/Lungs:  Clear to auscultation, Normal Respiratory Rate    Heart:  Rate: Normal  Rhythm: Regular Rhythm        Anesthesia Plan  Type of Anesthesia, risks & benefits discussed:    Anesthesia Type: Gen ETT  Intra-op Monitoring Plan: Standard ASA Monitors  Post Op Pain Control Plan: multimodal analgesia and IV/PO Opioids PRN  Induction:  IV  Informed Consent: Informed consent signed with the Patient and all parties understand the risks and agree with anesthesia plan.  All questions answered.   ASA Score: 3  Day of Surgery Review of History & Physical: H&P Update referred to the  surgeon/provider.    Ready For Surgery From Anesthesia Perspective.     .

## 2023-10-09 NOTE — TELEPHONE ENCOUNTER
Left message with medical records (Dr. Shultz cardiology) [phone] to have them fax requested documents for patient as sx is scheduled for tomorrow (10/10). Left number to contact us back [phone].

## 2023-10-10 ENCOUNTER — HOSPITAL ENCOUNTER (OUTPATIENT)
Facility: HOSPITAL | Age: 48
Discharge: HOME | End: 2023-10-10
Attending: ORTHOPAEDIC SURGERY | Admitting: ORTHOPAEDIC SURGERY
Payer: OTHER MISCELLANEOUS

## 2023-10-10 ENCOUNTER — ANESTHESIA (OUTPATIENT)
Dept: SURGERY | Facility: HOSPITAL | Age: 48
End: 2023-10-10
Payer: OTHER MISCELLANEOUS

## 2023-10-10 VITALS
SYSTOLIC BLOOD PRESSURE: 98 MMHG | TEMPERATURE: 98 F | RESPIRATION RATE: 19 BRPM | HEIGHT: 70 IN | OXYGEN SATURATION: 98 % | HEART RATE: 84 BPM | WEIGHT: 315 LBS | BODY MASS INDEX: 45.1 KG/M2 | DIASTOLIC BLOOD PRESSURE: 52 MMHG

## 2023-10-10 DIAGNOSIS — M25.562 LEFT KNEE PAIN: ICD-10-CM

## 2023-10-10 DIAGNOSIS — G89.29 CHRONIC PAIN OF LEFT KNEE: Primary | ICD-10-CM

## 2023-10-10 DIAGNOSIS — M25.562 CHRONIC PAIN OF LEFT KNEE: Primary | ICD-10-CM

## 2023-10-10 LAB — POCT GLUCOSE: 112 MG/DL (ref 70–110)

## 2023-10-10 PROCEDURE — 37000009 HC ANESTHESIA EA ADD 15 MINS: Performed by: ORTHOPAEDIC SURGERY

## 2023-10-10 PROCEDURE — D9220A PRA ANESTHESIA: ICD-10-PCS | Mod: ,,, | Performed by: NURSE ANESTHETIST, CERTIFIED REGISTERED

## 2023-10-10 PROCEDURE — 71000015 HC POSTOP RECOV 1ST HR: Performed by: ORTHOPAEDIC SURGERY

## 2023-10-10 PROCEDURE — 63600175 PHARM REV CODE 636 W HCPCS: Performed by: NURSE ANESTHETIST, CERTIFIED REGISTERED

## 2023-10-10 PROCEDURE — 36000710: Performed by: ORTHOPAEDIC SURGERY

## 2023-10-10 PROCEDURE — 64447 NJX AA&/STRD FEMORAL NRV IMG: CPT | Performed by: ANESTHESIOLOGY

## 2023-10-10 PROCEDURE — 29877 PR KNEE SCOPE,SHAVE ARTICULAR CART: ICD-10-PCS | Mod: LT,,, | Performed by: ORTHOPAEDIC SURGERY

## 2023-10-10 PROCEDURE — 37000008 HC ANESTHESIA 1ST 15 MINUTES: Performed by: ORTHOPAEDIC SURGERY

## 2023-10-10 PROCEDURE — 63600175 PHARM REV CODE 636 W HCPCS: Performed by: ANESTHESIOLOGY

## 2023-10-10 PROCEDURE — 64450 NJX AA&/STRD OTHER PN/BRANCH: CPT | Performed by: ORTHOPAEDIC SURGERY

## 2023-10-10 PROCEDURE — 71000033 HC RECOVERY, INTIAL HOUR: Performed by: ORTHOPAEDIC SURGERY

## 2023-10-10 PROCEDURE — 36000711: Performed by: ORTHOPAEDIC SURGERY

## 2023-10-10 PROCEDURE — 63600175 PHARM REV CODE 636 W HCPCS: Performed by: ORTHOPAEDIC SURGERY

## 2023-10-10 PROCEDURE — 63600175 PHARM REV CODE 636 W HCPCS: Performed by: PHYSICIAN ASSISTANT

## 2023-10-10 PROCEDURE — 25000003 PHARM REV CODE 250: Performed by: NURSE ANESTHETIST, CERTIFIED REGISTERED

## 2023-10-10 PROCEDURE — D9220A PRA ANESTHESIA: Mod: ,,, | Performed by: NURSE ANESTHETIST, CERTIFIED REGISTERED

## 2023-10-10 PROCEDURE — 29877 ARTHRS KNEE SURG DBRDMT/SHVG: CPT | Mod: LT,,, | Performed by: ORTHOPAEDIC SURGERY

## 2023-10-10 PROCEDURE — 27201423 OPTIME MED/SURG SUP & DEVICES STERILE SUPPLY: Performed by: ORTHOPAEDIC SURGERY

## 2023-10-10 RX ORDER — DIPHENHYDRAMINE HYDROCHLORIDE 50 MG/ML
25 INJECTION INTRAMUSCULAR; INTRAVENOUS EVERY 6 HOURS PRN
Status: DISCONTINUED | OUTPATIENT
Start: 2023-10-10 | End: 2023-10-10 | Stop reason: HOSPADM

## 2023-10-10 RX ORDER — BUPIVACAINE HYDROCHLORIDE 5 MG/ML
INJECTION, SOLUTION EPIDURAL; INTRACAUDAL
Status: DISCONTINUED
Start: 2023-10-10 | End: 2023-10-10 | Stop reason: WASHOUT

## 2023-10-10 RX ORDER — ONDANSETRON 4 MG/1
4 TABLET, FILM COATED ORAL EVERY 8 HOURS PRN
Qty: 30 TABLET | Refills: 0 | Status: SHIPPED | OUTPATIENT
Start: 2023-10-10 | End: 2024-02-29 | Stop reason: SDUPTHER

## 2023-10-10 RX ORDER — EPHEDRINE SULFATE 50 MG/ML
INJECTION, SOLUTION INTRAVENOUS
Status: DISCONTINUED | OUTPATIENT
Start: 2023-10-10 | End: 2023-10-10

## 2023-10-10 RX ORDER — HYDROCODONE BITARTRATE AND ACETAMINOPHEN 5; 325 MG/1; MG/1
1 TABLET ORAL
Qty: 25 TABLET | Refills: 0 | Status: SHIPPED | OUTPATIENT
Start: 2023-10-10

## 2023-10-10 RX ORDER — ONDANSETRON 2 MG/ML
4 INJECTION INTRAMUSCULAR; INTRAVENOUS ONCE AS NEEDED
Status: DISCONTINUED | OUTPATIENT
Start: 2023-10-10 | End: 2023-10-10

## 2023-10-10 RX ORDER — ACETAMINOPHEN 10 MG/ML
INJECTION, SOLUTION INTRAVENOUS
Status: DISCONTINUED | OUTPATIENT
Start: 2023-10-10 | End: 2023-10-10

## 2023-10-10 RX ORDER — ASPIRIN 81 MG/1
81 TABLET ORAL DAILY
Qty: 21 TABLET | Refills: 0 | Status: SHIPPED | OUTPATIENT
Start: 2023-10-11 | End: 2024-02-29 | Stop reason: SDUPTHER

## 2023-10-10 RX ORDER — LIDOCAINE HYDROCHLORIDE 20 MG/ML
INJECTION, SOLUTION EPIDURAL; INFILTRATION; INTRACAUDAL; PERINEURAL
Status: DISCONTINUED | OUTPATIENT
Start: 2023-10-10 | End: 2023-10-10

## 2023-10-10 RX ORDER — HYDROCODONE BITARTRATE AND ACETAMINOPHEN 5; 325 MG/1; MG/1
1 TABLET ORAL
Status: DISCONTINUED | OUTPATIENT
Start: 2023-10-10 | End: 2023-10-10

## 2023-10-10 RX ORDER — HYDROCODONE BITARTRATE AND ACETAMINOPHEN 5; 325 MG/1; MG/1
1 TABLET ORAL
Status: DISCONTINUED | OUTPATIENT
Start: 2023-10-10 | End: 2023-10-10 | Stop reason: HOSPADM

## 2023-10-10 RX ORDER — DOCUSATE SODIUM 100 MG/1
100 CAPSULE, LIQUID FILLED ORAL 2 TIMES DAILY
Qty: 30 CAPSULE | Refills: 0 | Status: SHIPPED | OUTPATIENT
Start: 2023-10-10 | End: 2024-02-29 | Stop reason: SDUPTHER

## 2023-10-10 RX ORDER — EPINEPHRINE 1 MG/ML
INJECTION, SOLUTION, CONCENTRATE INTRAVENOUS
Status: DISCONTINUED
Start: 2023-10-10 | End: 2023-10-10 | Stop reason: HOSPADM

## 2023-10-10 RX ORDER — FENTANYL CITRATE 50 UG/ML
INJECTION, SOLUTION INTRAMUSCULAR; INTRAVENOUS
Status: DISCONTINUED | OUTPATIENT
Start: 2023-10-10 | End: 2023-10-10

## 2023-10-10 RX ORDER — DEXMEDETOMIDINE HYDROCHLORIDE 100 UG/ML
INJECTION, SOLUTION INTRAVENOUS
Status: DISCONTINUED | OUTPATIENT
Start: 2023-10-10 | End: 2023-10-10

## 2023-10-10 RX ORDER — ROPIVACAINE HYDROCHLORIDE 5 MG/ML
INJECTION, SOLUTION EPIDURAL; INFILTRATION; PERINEURAL
Status: COMPLETED | OUTPATIENT
Start: 2023-10-10 | End: 2023-10-10

## 2023-10-10 RX ORDER — LIDOCAINE HYDROCHLORIDE 10 MG/ML
INJECTION, SOLUTION EPIDURAL; INFILTRATION; INTRACAUDAL; PERINEURAL
Status: DISCONTINUED
Start: 2023-10-10 | End: 2023-10-10 | Stop reason: WASHOUT

## 2023-10-10 RX ORDER — FENTANYL CITRATE 50 UG/ML
25 INJECTION, SOLUTION INTRAMUSCULAR; INTRAVENOUS EVERY 5 MIN PRN
Status: DISCONTINUED | OUTPATIENT
Start: 2023-10-10 | End: 2023-10-10 | Stop reason: HOSPADM

## 2023-10-10 RX ORDER — PROPOFOL 10 MG/ML
VIAL (ML) INTRAVENOUS
Status: DISCONTINUED | OUTPATIENT
Start: 2023-10-10 | End: 2023-10-10

## 2023-10-10 RX ORDER — SODIUM CHLORIDE 9 MG/ML
INJECTION, SOLUTION INTRAVENOUS CONTINUOUS
Status: DISCONTINUED | OUTPATIENT
Start: 2023-10-10 | End: 2023-10-10 | Stop reason: HOSPADM

## 2023-10-10 RX ORDER — ONDANSETRON HYDROCHLORIDE 2 MG/ML
INJECTION, SOLUTION INTRAMUSCULAR; INTRAVENOUS
Status: DISCONTINUED | OUTPATIENT
Start: 2023-10-10 | End: 2023-10-10

## 2023-10-10 RX ORDER — DEXAMETHASONE SODIUM PHOSPHATE 4 MG/ML
INJECTION, SOLUTION INTRA-ARTICULAR; INTRALESIONAL; INTRAMUSCULAR; INTRAVENOUS; SOFT TISSUE
Status: DISCONTINUED | OUTPATIENT
Start: 2023-10-10 | End: 2023-10-10

## 2023-10-10 RX ORDER — EPINEPHRINE CONVENIENCE KIT 1 MG/ML(1)
KIT INTRAMUSCULAR; SUBCUTANEOUS
Status: DISCONTINUED | OUTPATIENT
Start: 2023-10-10 | End: 2023-10-10 | Stop reason: HOSPADM

## 2023-10-10 RX ORDER — MEPERIDINE HYDROCHLORIDE 25 MG/ML
12.5 INJECTION INTRAMUSCULAR; INTRAVENOUS; SUBCUTANEOUS ONCE
Status: DISCONTINUED | OUTPATIENT
Start: 2023-10-10 | End: 2023-10-10

## 2023-10-10 RX ORDER — ROCURONIUM BROMIDE 10 MG/ML
INJECTION, SOLUTION INTRAVENOUS
Status: DISCONTINUED | OUTPATIENT
Start: 2023-10-10 | End: 2023-10-10

## 2023-10-10 RX ORDER — MEPERIDINE HYDROCHLORIDE 25 MG/ML
12.5 INJECTION INTRAMUSCULAR; INTRAVENOUS; SUBCUTANEOUS ONCE
Status: DISCONTINUED | OUTPATIENT
Start: 2023-10-10 | End: 2023-10-10 | Stop reason: HOSPADM

## 2023-10-10 RX ORDER — MIDAZOLAM HYDROCHLORIDE 1 MG/ML
INJECTION INTRAMUSCULAR; INTRAVENOUS
Status: DISCONTINUED | OUTPATIENT
Start: 2023-10-10 | End: 2023-10-10

## 2023-10-10 RX ORDER — DIPHENHYDRAMINE HYDROCHLORIDE 50 MG/ML
25 INJECTION INTRAMUSCULAR; INTRAVENOUS EVERY 6 HOURS PRN
Status: DISCONTINUED | OUTPATIENT
Start: 2023-10-10 | End: 2023-10-10

## 2023-10-10 RX ORDER — FENTANYL CITRATE 50 UG/ML
25 INJECTION, SOLUTION INTRAMUSCULAR; INTRAVENOUS EVERY 5 MIN PRN
Status: DISCONTINUED | OUTPATIENT
Start: 2023-10-10 | End: 2023-10-10

## 2023-10-10 RX ORDER — CHLORHEXIDINE GLUCONATE ORAL RINSE 1.2 MG/ML
10 SOLUTION DENTAL
Status: DISCONTINUED | OUTPATIENT
Start: 2023-10-10 | End: 2023-10-10 | Stop reason: HOSPADM

## 2023-10-10 RX ORDER — ONDANSETRON 2 MG/ML
4 INJECTION INTRAMUSCULAR; INTRAVENOUS ONCE AS NEEDED
Status: DISCONTINUED | OUTPATIENT
Start: 2023-10-10 | End: 2023-10-10 | Stop reason: HOSPADM

## 2023-10-10 RX ORDER — SUCCINYLCHOLINE CHLORIDE 20 MG/ML
INJECTION INTRAMUSCULAR; INTRAVENOUS
Status: DISCONTINUED | OUTPATIENT
Start: 2023-10-10 | End: 2023-10-10

## 2023-10-10 RX ADMIN — MIDAZOLAM HYDROCHLORIDE 2 MG: 1 INJECTION INTRAMUSCULAR; INTRAVENOUS at 09:10

## 2023-10-10 RX ADMIN — DEXMEDETOMIDINE HYDROCHLORIDE 4 MCG: 100 INJECTION, SOLUTION INTRAVENOUS at 11:10

## 2023-10-10 RX ADMIN — DEXTROSE 3 G: 50 INJECTION, SOLUTION INTRAVENOUS at 10:10

## 2023-10-10 RX ADMIN — EPHEDRINE SULFATE 10 MG: 50 INJECTION INTRAVENOUS at 10:10

## 2023-10-10 RX ADMIN — PROPOFOL 200 MG: 10 INJECTION, EMULSION INTRAVENOUS at 10:10

## 2023-10-10 RX ADMIN — ROCURONIUM BROMIDE 10 MG: 10 SOLUTION INTRAVENOUS at 10:10

## 2023-10-10 RX ADMIN — DEXMEDETOMIDINE HYDROCHLORIDE 4 MCG: 100 INJECTION, SOLUTION INTRAVENOUS at 10:10

## 2023-10-10 RX ADMIN — LIDOCAINE HYDROCHLORIDE 80 MG: 20 INJECTION, SOLUTION EPIDURAL; INFILTRATION; INTRACAUDAL; PERINEURAL at 10:10

## 2023-10-10 RX ADMIN — ONDANSETRON HYDROCHLORIDE 4 MG: 2 INJECTION, SOLUTION INTRAMUSCULAR; INTRAVENOUS at 10:10

## 2023-10-10 RX ADMIN — ACETAMINOPHEN 1000 MG: 10 INJECTION, SOLUTION INTRAVENOUS at 10:10

## 2023-10-10 RX ADMIN — DEXAMETHASONE SODIUM PHOSPHATE 8 MG: 4 INJECTION, SOLUTION INTRA-ARTICULAR; INTRALESIONAL; INTRAMUSCULAR; INTRAVENOUS; SOFT TISSUE at 10:10

## 2023-10-10 RX ADMIN — FENTANYL CITRATE 50 MCG: 0.05 INJECTION, SOLUTION INTRAMUSCULAR; INTRAVENOUS at 09:10

## 2023-10-10 RX ADMIN — ROPIVACAINE HYDROCHLORIDE 20 ML: 5 INJECTION, SOLUTION EPIDURAL; INFILTRATION; PERINEURAL at 09:10

## 2023-10-10 RX ADMIN — SUCCINYLCHOLINE CHLORIDE 160 MG: 20 INJECTION, SOLUTION INTRAMUSCULAR; INTRAVENOUS; PARENTERAL at 10:10

## 2023-10-10 RX ADMIN — EPHEDRINE SULFATE 15 MG: 50 INJECTION INTRAVENOUS at 10:10

## 2023-10-10 RX ADMIN — FENTANYL CITRATE 50 MCG: 0.05 INJECTION, SOLUTION INTRAMUSCULAR; INTRAVENOUS at 10:10

## 2023-10-10 NOTE — DISCHARGE INSTRUCTIONS
Knee Surgery Post-Operative Instructions     Og Roberts MD   [address] [place] [address]  Ph: [phone] Fax: [phone]    After you get home, apply ice to your knee but keep the bandages dry. You may apply ice?for 15-20 minutes every 1-2 hours for first week. Ice helps to reduce pain and?swelling. Never apply ice directly to the skin. If you are using a CryoCuff/PolarIce, it should be ice cold for no more than 15-20 minutes every 1-2 hours.     Elevate your leg on 2-3 pillows or rolled up towels placed under the heel so that the heel?is elevated higher than your knee. This will help reduce swelling and achieve full?extension of the knee.     It is important to get up and move around after your surgery. It's good for your lungs after anesthesia, and also good for your circulation to help prevent blood clots from developing.  However, too much walking will cause the knee to swell and hurt.     After 72 hours, you can remove the ACE wrap and bandages. You should then place new gauze/bandages and ACE wrap each day for 2 weeks.     You may shower, but the incisions, ACE bandages, and Brace must not get wet until 72 hours after surgery and only if there is no drainage at all from the incisions. Do not soak the knee under water for 2 weeks.     Weight-Bearing Status: You are to be (weight bearing) on your operative leg.? Range of motion: As tolerated    Take the pain medicine as needed. You may take up to 2 tablets every 4-6 hours if?needed. As the pain subsides try to increase the time between doses.      Your first post-operative check-up with Dr. Roberts 10-14 days from the?day of surgery.        It is normal to have some discomfort and swelling, as well as a small amount of blood-tinged drainage, following surgery. If this becomes severe, or if you develop a fever greater than or equal to?101 degrees, calf pain, or shortness of breath or chest pain, please call immediately. If?you  have questions or problems, call the office at [phone].     NORMAL SENSATIONS AND FINDINGS AFTER SURGERY   Shin pain   Knee swelling and warmth up to 2 weeks   Small amounts of bloody drainage   Numbness around the incision area   Soreness and swelling in the back of the knee   Bruising to the lower leg   Lower leg swelling, including the ankle - if this occurs elevate the leg above the heart?and apply ice to the swollen areas.   Numbness to the foot if you had a nerve block - will resolve within a few days   Low grade temperature less than 101.5 - if this occurs drink plenty of fluids and cough?and deep breathe (take 10 breaths, on the last hold for a second then forcefully cough a?few times). A low grade temp is normal for a week after surgery   Small amount of redness to the area where the sutures insert in the skin  Low back discomfort due to the epidural / spinal anesthesia apply a heating pad as?needed      NOTIFY OUR OFFICE IMMEDIATELY AT [phone] IF ANY OF THE FOLLOWING SIGNS OR SYMPTOMS OCCUR:   Chest pain or shortness of breath   Change is noted to your incision (i.e. increased redness or drainage)   Numbness of your foot if you didn't have a nerve block   Sharp pains in the back of your hip, thigh, or calf   Temperature greater than 101.5 degrees   Fever, chills, nausea, vomiting or diarrhea   Stitches loosen or fall out and incisions open up   Thick, foul-smelling drainage (yellow or greenish)   Increased pain which is not relieved by medications or other measures mentioned above       Knee & Quad Exercise Instructions     Og Roberts MD   [address] [facility] [place] [address]  Ph: [phone] Fax: [phone]       Exercises listed are to be performed by the patient following surgery. Perform sets of 10 repetitions, 4 times per day.        Heel Slides        Lie flat or sit with your leg straight. Slide your heel toward your hip. Try to get your knee bent to a 90° angle.  Slide your heel back so your leg is straight then relax.       Knee Extension (Lying Down)      While lying down, rest your ankle on a towel roll so that your knee and calf are not touching the floor. Allow gravity to straighten your knee. Maintain this position for up to 10 minutes.       Knee Extension (Sitting in a Chair)      While sitting in a chair, prop your heel on another chair so that there is nothing behind your calf or knee. Allow gravity to straighten your knee. Maintain this position for up to 10 minute       Patellar Mobilization      This exercise is done by simply pushing the patella up and down and side to side and holding that position. Movement of the patella is essential when restoring range of motion. If the patella cannot move within the femoral groove, then the knee cannot bend and extend.?         Quadriceps Isometrics (Quad Sets)        Lie flat or sit with your surgical leg straight. Tighten the muscle in the front of your thigh as much as you can, pushing the back or your knee flat against the floor. Hold this tight for 5 seconds then relax.        Straight Leg Raises (SLR)      Lie flat or sit with your leg straight and your knee brace on (if you have one). You may have your non-operative knee bent slightly for comfort. Perform a Quad set (as above) and flex your toes straight up. Lift your heel off of the floor and hold for at least 5 seconds. Keep your thigh muscle as tight as you can and lower your heel back down then relax.       Seated Knee Flexion        Sit with your legs dangling over the bed. Relax your leg allowing gravity to bend your knee. You may use your non-operative leg to gently push your operative leg into more of a bend. Maintain this position for up to 10 minutes.        Calf Pumps         Point and flex your toes to tighten your calf muscles.

## 2023-10-10 NOTE — OP NOTE
[facility] -  Orthopaedic Surgery & Sports Medicine  [facility] Peri Services    OPERATIVE NOTE    Procedure Date: 10/10/2023     Preoperative Diagnosis:  Chondromalacia, left knee [M94.262]  Tear of medial meniscus of left knee, current, unspecified tear type, subsequent encounter [S83.242D]      Postoperative Diagnosis:  Post-Op Diagnosis Codes:     * Chondromalacia, left knee [M94.262]     * Tear of medial meniscus of left knee, current, unspecified tear type, subsequent encounter [S83.242D]  Plica medial knee    Surgeon: Og Roberts MD    Procedure Performed:  Procedure(s) (LRB):  Left knee arthroscopy, possible meniscectomy, any indicated procedures (Left)  EXCISION, PLICA, KNEE, ARTHROSCOPIC (Left)     Left knee chondroplasty of MFC and Patella    Anesthesia: General     Block: None    Fluids: Per Anesthesia Record    UOP: Per Anesthesia Record    Blood Loss: * No values recorded between 10/10/2023 10:31 AM and 10/10/2023 11:07 AM *    Implants: * No implants in log *    Specimens:   Specimen (24h ago, onward)      None             Drains: None    Tourniquet Time: none    Complications: No    Fluoro/C-arm utilized during the case: None    Preoperative Exam Under Anesthesia Findings:   ROM: 0-125  Lachman: 1A   Pivot Shift: Negative   Anterior Drawer: Negative   Posterior Drawer: Negative  Varus @ 0: Stable  Varus @ 30: Stable   Dial Test @ 0: Negative  Dial Test @ 30: Negative  Valgus @ 0: Stable  Valgus @ 30: Stable    Intraoperative Findings:   ACL: Intact  PCL: Intact  Medial Meniscus:  There was partial-thickness tearing at the root.  There were centrally based flaps in the body with some undersurface fraying and a partial complex tear with communication with inferior leaflet  Lateral Meniscus:  Normal  MFC:  There was a 3 x 3 cm area of fissuring and partial-thickness chondral loss in the lateral aspect of the medial weight-bearing zone  MTP:  Grade 1  LFC:  Normal  LTP:  Normal  Patella:   Grade 2  Trochlea:  Grade 1  Gutters:  Synovitis with a medial based plica    Indications for Procedure and Brief History:  This is a 48-year-old male who had medial-sided pain and mechanical symptoms of his knee that failed conservative management. I had a long discussion with the patient about treatment options. We discussed operative and non-operative options. We discussed the risks of surgery at length, including but not limited to pain, infection, bleeding, damage to adjacent structures such as nerves and blood vessels, failure to heal, stiffness, laxity, need for more surgery, stroke, blood clot, loss of life, loss of limb, need for removal of any implants used, anesthesia risks, breathing problems, and heart problems. We talked about common and uncommon risks. We discussed risks that were higher specific to the patient. They expressed understanding of the risks and opted to proceed with surgical management.    Description of Procedure: I met the the patient in the preoperative holding area. I identified, confirmed, and marked the operative extremity. All questions were answered. The patient was then taken back to the operating room and transferred to the operative table. The patient was placed in the supine position. All bony prominences were padded. A foot pad was placed to assist positioning at 90 degrees and at hyperflexion. Anesthesia was induced without complication. A tourniquet with adequate padding was placed at the proximal thigh. The operative extremity was then prepped and draped in the standard sterile fashion with a chlorhexidine and alcohol solution. A timeout was performed to ensure we had the proper patient, proper operative site, and were performing the proper procedure. All members of the operative team were in agreement with this. Intravenous antibiotics were administered within 60 minutes prior to the incision.     I began the procedure by performing a diagnostic arthroscopy.  I made my  initial portal with a 11 knife anterolaterally, just adjacent to the patellar tendon next to the inferior pole of the patella. I then made an anteromedial portal utilizing a spinal needle for localization under arthroscopic visualization. I made the first portal adjacent to the medial border of the patellar tendon and just above the meniscus. I then gently resected excess fat pad with an arthroscopic shaver only as needed for visualization. I then performed a standard diagnostic arthroscopy, examining all compartments and intra-articular spaces of the knee. The key findings are listed above. I switched between all of the portals for viewing and instrumentation throughout the case as needed, and switched between and 30 degree and 70 degree scope as needed.    Meniscus Surgery:  I used an arthroscopic 4.0 mm curved and straight torpedo shaver to gently debride back unstable flaps meniscus.  Due to existing chondral wear in the medial compartment, the patient's BMI, and the partial-thickness nature of the root tear we did not repair this but chose to debride this back to a stable contour.  We also debrided back the unstable flaps of the medial meniscus and especially those in the inferior leaflet and some that were slightly displaced into the gutter.    Additional Surgical Procedures:  We then moved on to perform a chondroplasty of medial femoral condyle of the patella by debriding back unstable cartilage flaps but leaving as much viable meniscus in place as possible.    I then moved on and debrided back the largest portion of the medial based plica that appeared to be communicating with the trochlea and possibly causing mechanical symptoms and pain medially.  I did this with an arthroscopic 4.0 mm torpedo shaver    We then repeated our diagnostic arthroscopy to make sure there was no surgical debris, and to reexamine the knee. We then suctioned out excess arthroscopic fluid, and we performed a closure using 3-0 nylons  in the skin in the skin. We placed sterile dressings over the wound. All sponge, instrument, and needle counts were correct x 2 at the end of the case. I was present and performed all key portions of the procedure.  The patient was awoken from anesthesia transported to the PACU in stable condition. The patient was examined postoperatively and the limb was warm and well perfused with appropriate neurovascular status relative to preoperative status and block status.     Condition: Good    Disposition: PACU - hemodynamically stable.    Attestation: I was present and scrubbed for the entire procedure.    Postoperative Plan:  Meniscectomy protocol  Weight bearing:  As tolerated  Range of motion:  As tolerated  DVT Ppx:  Aspirin daily  PT/OT: Start POD#2 or #3  Follow-up: 10-14 days        Og Roberts MD  Orthopaedic Surgery & Sports Medicine

## 2023-10-10 NOTE — ANESTHESIA PROCEDURE NOTES
Hpi Title: Evaluation of Skin Lesions
Intubation    Date/Time: 10/10/2023 10:05 AM    Performed by: Maribel Barrientos CRNA  Authorized by: Ashley Sevilla MD    Intubation:     Induction:  Intravenous    Intubated:  Postinduction    Mask Ventilation:  Moderately difficult with oral airway (2-person mask with oral airway in place)    Attempts:  1    Attempted By:  CRNA    Method of Intubation:  Video laryngoscopy    Blade:  Jerome 4    Laryngeal View Grade: Grade I - full view of cords      Difficult Airway Encountered?: No      Complications:  None    Airway Device:  Oral endotracheal tube    Airway Device Size:  7.5    Style/Cuff Inflation:  Cuffed (inflated to minimal occlusive pressure)    Inflation Amount (mL):  9    Tube secured:  25    Secured at:  The lips    Placement Verified By:  Capnometry    Complicating Factors:  Obesity    Findings Post-Intubation:  BS equal bilateral and atraumatic/condition of teeth unchanged      
Peripheral Block    Patient location during procedure: pre-op   Block not for primary anesthetic.  Reason for block: at surgeon's request and post-op pain management   Post-op Pain Location: Left knee   Start time: 10/10/2023 9:44 AM  Timeout: 10/10/2023 9:44 AM   End time: 10/10/2023 9:49 AM    Staffing  Authorizing Provider: Ashley Sevilla MD  Performing Provider: Ashley Sevilla MD    Staffing  Other anesthesia staff: Maribel Barrientos CRNA  Performed by: Ashley Sevilla MD  Authorized by: Ashley Sevilla MD    Preanesthetic Checklist  Completed: patient identified, IV checked, site marked, risks and benefits discussed, surgical consent, monitors and equipment checked, pre-op evaluation and timeout performed  Peripheral Block  Patient position: supine  Prep: ChloraPrep  Patient monitoring: heart rate, cardiac monitor, continuous pulse ox, continuous capnometry and frequent blood pressure checks  Block type: adductor canal  Laterality: left  Injection technique: single shot  Needle  Needle type: Stimuplex   Needle gauge: 21 G  Needle length: 4 in  Needle localization: anatomical landmarks and ultrasound guidance   -ultrasound image captured on disc.  Assessment  Injection assessment: negative aspiration, negative parasthesia and local visualized surrounding nerve  Paresthesia pain: none  Heart rate change: no  Slow fractionated injection: yes  Pain Tolerance: comfortable throughout block and no complaints  Medications:    Medications: ropivacaine (NAROPIN) injection 0.5% - Perineural   20 mL - 10/10/2023 9:49:00 AM    Additional Notes  VSS.  [facility] RN monitoring vitals throughout procedure.  Patient tolerated procedure well.               
Additional History: Patient  here for a 3 month MM skin check and has lesions on back he would like evaluated. . \\n\\nPatient was screened before evaluation for COVID-19 by inquiring about fever, Shortness of breath, weakness, fatigue, loss of taste or smell and gastrointestinal symptoms. Patient denies any of the above. \\nNote: the new paper (History and Intake )form was reviewed at the time of visit, but the data was not yet entered into [facility].

## 2023-10-10 NOTE — BRIEF OP NOTE
[facility] - Periop Services  Brief Operative Note    Surgery Date: 10/10/2023     Surgeon(s) and Role:     * Og Roberts MD - Primary    Assisting Surgeon: none    Pre-op Diagnosis:  Chondromalacia, left knee [M94.262]  Tear of medial meniscus of left knee, current, unspecified tear type, subsequent encounter [S83.242D]    Post-op Diagnosis:  Post-Op Diagnosis Codes:     * Chondromalacia, left knee [M94.262]     * Tear of medial meniscus of left knee, current, unspecified tear type, subsequent encounter [S83.242D]    Procedure(s) (LRB):  Left knee arthroscopy, possible meniscectomy, any indicated procedures (Left)  EXCISION, PLICA, KNEE, ARTHROSCOPIC (Left)    Anesthesia: General    Operative Findings: Left knee scope, left knee plica excision, chondroplasty, and menisectomy.     Estimated Blood Loss: * No values recorded between 10/10/2023 10:31 AM and 10/10/2023 11:02 AM *         Specimens:   Specimen (24h ago, onward)      None              Discharge Note    OUTCOME: Patient tolerated treatment/procedure well without complication and is now ready for discharge.    DISPOSITION: Home or Self Care    FINAL DIAGNOSIS:  Left knee pain    FOLLOWUP: In clinic    DISCHARGE INSTRUCTIONS:  No discharge procedures on file.

## 2023-10-10 NOTE — TRANSFER OF CARE
"Anesthesia Transfer of Care Note    Patient: Pio Navarro    Procedure(s) Performed: Procedure(s) (LRB):  Left knee arthroscopy, possible meniscectomy, any indicated procedures (Left)  EXCISION, PLICA, KNEE, ARTHROSCOPIC (Left)    Patient location: PACU    Anesthesia Type: general    Transport from OR: Transported from OR on room air with adequate spontaneous ventilation    Post pain: adequate analgesia    Post assessment: no apparent anesthetic complications    Post vital signs: stable    Level of consciousness: awake    Nausea/Vomiting: no nausea/vomiting    Complications: none    Transfer of care protocol was followed      Last vitals:   Visit Vitals  /65 (BP Location: Right arm, Patient Position: Lying)   Pulse 75   Temp 36.3 °C (97.3 °F) (Temporal)   Resp 16   Ht 5' 10" (1.778 m)   Wt (!) 154.1 kg (339 lb 11.7 oz)   SpO2 97%   BMI 48.75 kg/m²     "

## 2023-10-10 NOTE — INTERVAL H&P NOTE
The patient has been examined and the H&P has been reviewed:    I concur with the findings and no changes have occurred since H&P was written.    Surgery risks, benefits and alternative options discussed and understood by patient/family.    I had a long discussion with the patient about treatment options, including operative and nonoperative treatments. We discussed pros and cons of each including risks pertinent to surgery including pain, infection, bleeding, damage to adjacent structures like nerves and blood vessels, failure to heal, need for future surgeries, stiffness, instability, loss of limb, anesthesia risks like stroke, blood clot, loss of life. We discussed the possibility of need for later hardware removal in the case that hardware was used. We discussed common and uncommon risks, and discussed patient specific factors that may increase the risks present with surgery including obesity and underlying medical conditions, and his spinal stimulator. All questions were answered. The patient expressed understanding of the pros and cons of surgery and wanted to proceed with surgical treatment. I had a long discussion with the patient and any present family regarding treatment options. I explained that although the meniscus will usually not heal on its own, not all meniscus tears need surgery. We discussed that many people will improve with therapy and nonoperative management. We discussed the blood supply of the meniscus and the fact that some patients and some tear patterns are more amenable to repair. We discussed that when performing operative treatment of meniscus tears, we attempt to repair tears that we think need to be repaired and have a good chance of healing. If we do perform a meniscectomy, we attempt to leave as much meniscus intact as possible. We discussed the implications of having a torn or deficient meniscus. We discussed the rehab, weight bearing, and postoperative differences between repair  and resection, and we discussed postoperative expectations.      There are no hospital problems to display for this patient.

## 2023-10-10 NOTE — ANESTHESIA POSTPROCEDURE EVALUATION
Anesthesia Post Evaluation    Patient: Pio Navarro    Procedure(s) Performed: Procedure(s) (LRB):  Left knee arthroscopy, possible meniscectomy, any indicated procedures (Left)  EXCISION, PLICA, KNEE, ARTHROSCOPIC (Left)    Final Anesthesia Type: general      Patient location during evaluation: PACU  Patient participation: Yes- Able to Participate  Level of consciousness: awake and alert and oriented  Post-procedure vital signs: reviewed and stable  Pain management: adequate  Airway patency: patent    PONV status at discharge: No PONV  Anesthetic complications: no      Cardiovascular status: blood pressure returned to baseline, stable and hemodynamically stable  Respiratory status: unassisted  Hydration status: euvolemic  Follow-up not needed.          Vitals Value Taken Time   BP 98/52 10/10/23 1210   Temp 36.9 °C (98.4 °F) 10/10/23 1109   Pulse 84 10/10/23 1210   Resp 19 10/10/23 1210   SpO2 98 % 10/10/23 1210         Event Time   Out of Recovery 12:00:00         Pain/Roberto Score: Roberto Score: 10 (10/10/2023 12:10 PM)

## 2023-10-17 ENCOUNTER — PATIENT MESSAGE (OUTPATIENT)
Dept: SPORTS MEDICINE | Facility: CLINIC | Age: 48
End: 2023-10-17
Payer: COMMERCIAL

## 2023-10-23 ENCOUNTER — OFFICE VISIT (OUTPATIENT)
Dept: SPORTS MEDICINE | Facility: CLINIC | Age: 48
End: 2023-10-23
Attending: ORTHOPAEDIC SURGERY
Payer: OTHER MISCELLANEOUS

## 2023-10-23 VITALS — HEIGHT: 70 IN | WEIGHT: 315 LBS | BODY MASS INDEX: 45.1 KG/M2

## 2023-10-23 DIAGNOSIS — Z98.890 S/P ARTHROSCOPIC SURGERY OF LEFT KNEE: ICD-10-CM

## 2023-10-23 DIAGNOSIS — Z98.890 POST-OPERATIVE STATE: Primary | ICD-10-CM

## 2023-10-23 DIAGNOSIS — G89.29 CHRONIC BACK PAIN, UNSPECIFIED BACK LOCATION, UNSPECIFIED BACK PAIN LATERALITY: ICD-10-CM

## 2023-10-23 DIAGNOSIS — R73.03 PRE-DIABETES: ICD-10-CM

## 2023-10-23 DIAGNOSIS — E66.01 CLASS 3 SEVERE OBESITY WITH BODY MASS INDEX (BMI) OF 45.0 TO 49.9 IN ADULT, UNSPECIFIED OBESITY TYPE, UNSPECIFIED WHETHER SERIOUS COMORBIDITY PRESENT: ICD-10-CM

## 2023-10-23 DIAGNOSIS — G89.29 CHRONIC PAIN OF LEFT KNEE: ICD-10-CM

## 2023-10-23 DIAGNOSIS — Z98.890 S/P ARTHROSCOPIC PARTIAL MEDIAL MENISCECTOMY OF LEFT KNEE: ICD-10-CM

## 2023-10-23 DIAGNOSIS — M25.562 CHRONIC PAIN OF LEFT KNEE: ICD-10-CM

## 2023-10-23 DIAGNOSIS — I10 HYPERTENSION, UNSPECIFIED TYPE: ICD-10-CM

## 2023-10-23 DIAGNOSIS — M17.12 PRIMARY OSTEOARTHRITIS OF LEFT KNEE: ICD-10-CM

## 2023-10-23 DIAGNOSIS — M54.9 CHRONIC BACK PAIN, UNSPECIFIED BACK LOCATION, UNSPECIFIED BACK PAIN LATERALITY: ICD-10-CM

## 2023-10-23 DIAGNOSIS — M94.262 CHONDROMALACIA, LEFT KNEE: ICD-10-CM

## 2023-10-23 DIAGNOSIS — Z87.828 S/P ARTHROSCOPIC PARTIAL MEDIAL MENISCECTOMY OF LEFT KNEE: ICD-10-CM

## 2023-10-23 PROCEDURE — 99999 PR PBB SHADOW E&M-EST. PATIENT-LVL III: ICD-10-PCS | Mod: PBBFAC,,, | Performed by: PHYSICIAN ASSISTANT

## 2023-10-23 PROCEDURE — 99999 PR PBB SHADOW E&M-EST. PATIENT-LVL III: CPT | Mod: PBBFAC,,, | Performed by: PHYSICIAN ASSISTANT

## 2023-10-23 PROCEDURE — 99024 PR POST-OP FOLLOW-UP VISIT: ICD-10-PCS | Mod: S$GLB,,, | Performed by: PHYSICIAN ASSISTANT

## 2023-10-23 PROCEDURE — 99024 POSTOP FOLLOW-UP VISIT: CPT | Mod: S$GLB,,, | Performed by: PHYSICIAN ASSISTANT

## 2023-10-23 NOTE — PROGRESS NOTES
Patient ID: Pio Navarro  YOB: 1975  MRN: [id number]    Chief Complaint: Injury of the Left Knee (Patient is here today for L knee pain due to L knee injury at work. L knee stitch removal and F/U after L knee surgery. Pain is a 4/10.)    Referred By: Workers comp    History of Present Illness: Pio Navarro is a  48 y.o. male    with a chief complaint of Injury of the Left Knee (Patient is here today for L knee pain due to L knee injury at work. L knee stitch removal and F/U after L knee surgery. Pain is a 4/10.)      Pio Navarro presents today 2 weeks s/p left knee scope, medial menisectomy, excision of plica, and chondroplasty of MFC & patella on 10/10/2023. He presents FWB with no brace/assistive devices. The patient has started physical therapy at [facility]. Patient states that he has concerns about returning to work full duty.    Past Medical History:   Past Medical History:   Diagnosis Date    Hypertension     Pre-diabetes      Past Surgical History:   Procedure Laterality Date    ARTHROSCOPIC CHONDROPLASTY OF KNEE JOINT Left 10/10/2023    Procedure: ARTHROSCOPY, KNEE, WITH CHONDROPLASTY;  Surgeon: Og Roberts MD;  Location[facility];  Service: Orthopedics;  Laterality: Left;    BACK SURGERY      HAND SURGERY      HERNIA REPAIR  11/2022    KNEE ARTHROSCOPY W/ MENISCECTOMY Left 10/10/2023    Procedure: ARTHROSCOPY, KNEE, WITH MENISCECTOMY;  Surgeon: Og Roberts MD;  Location: [facility] OR;  Service: Orthopedics;  Laterality: Left;    KNEE ARTHROSCOPY W/ PLICA EXCISION Left 10/10/2023    Procedure: EXCISION, PLICA, KNEE, ARTHROSCOPIC;  Surgeon: Og Roberts MD;  Location: [facility] OR;  Service: Orthopedics;  Laterality: Left;     Family History   Problem Relation Age of Onset    Cancer Mother     Cancer Father     No Known Problems Sister     No Known Problems Sister     Cancer Maternal Grandmother     Cancer Maternal Grandfather     Cancer Paternal Grandmother      Cancer Paternal Grandfather      Social History     Socioeconomic History    Marital status:    Tobacco Use    Smoking status: Unknown   Substance and Sexual Activity    Alcohol use: Yes     Comment: 12 beers a year    Drug use: Never    Sexual activity: Yes     Medication List with Changes/Refills   Current Medications    ASPIRIN (ECOTRIN) 81 MG EC TABLET    Take 1 tablet (81 mg total) by mouth once daily. for 21 days    DOCUSATE SODIUM (COLACE) 100 MG CAPSULE    Take 1 capsule (100 mg total) by mouth 2 (two) times daily.    HYDROCODONE-ACETAMINOPHEN (NORCO) 5-325 MG PER TABLET    Take 1 tablet by mouth every 4 to 6 hours as needed for Pain.    METFORMIN (GLUCOPHAGE) 500 MG TABLET    Take 500 mg by mouth 2 (two) times daily with meals.    ONDANSETRON (ZOFRAN) 4 MG TABLET    Take 1 tablet (4 mg total) by mouth every 8 (eight) hours as needed for Nausea.    VALSARTAN-HYDROCHLOROTHIAZIDE (DIOVAN-HCT) 80-12.5 MG PER TABLET    Take by mouth.     Review of patient's allergies indicates:   Allergen Reactions    Egg Nausea And Vomiting     Review of Systems   Constitutional: Negative for chills and fever.   HENT:  Negative for sore throat.    Eyes:  Negative for pain.   Cardiovascular:  Negative for chest pain and leg swelling.   Respiratory:  Negative for cough and shortness of breath.    Skin:  Negative for itching and rash.   Musculoskeletal:  Positive for joint pain and joint swelling.   Gastrointestinal:  Negative for abdominal pain, nausea and vomiting.   Genitourinary:  Negative for dysuria.   Neurological:  Negative for dizziness, numbness and paresthesias.       Physical Exam:   Body mass index is 48.75 kg/m².  There were no vitals filed for this visit.   GENERAL: Well appearing, appropriate for stated age, no acute distress.  CARDIOVASCULAR: Pulses regular by peripheral palpation.  PULMONARY: Respirations are even and non-labored.  NEURO: Awake, alert, and oriented x 3.  PSYCH: Mood & affect are  appropriate.  HEENT: Head is normocephalic and atraumatic.    Ortho/SPM Exam  Left Knee Exam  Sutures removed, incision sites clean dry and intact, no signs of infection  Minimal effusion   Knee ROM full extension to 120 degrees flexion   All compartments are soft and compressible. Calf soft non-tender. Intact EHL, FHL, gastrocsoleus, and tibialis anterior. Sensation intact to light touch in superficial peroneal, deep peroneal, tibial, sural, and saphenous nerve distributions. Foot warm and well perfused with capillary refill of less than 2 seconds and palpable pedal pulses.       Imaging:   XR Results:  Results for orders placed during the hospital encounter of 02/28/23    X-ray Knee Ortho Left with Flexion    Narrative  EXAM: XR KNEE ORTHO LEFT WITH FLEXION    CLINICAL INDICATION:   Pain in left knee    FINDINGS:  No comparison studies are available.  AP, oblique and sunrise views of both knees, as well as a lateral view of the left knee were submitted for interpretation.  Negative for left knee joint effusion.  There is a bone island within the left lateral and right medial femoral condyles.  Small left Fabella.    Alignment is satisfactory. No     fractures, dislocations, or erosive arthritic change.  Negative for radiopaque foreign bodies or air in the soft tissues.  Joint spaces are well-maintained.    Impression  1.  Negative for acute process involving the right or left knee.  2.  Incidental findings as noted above.    Finalized on: 2/28/2023 3:25 PM By:  Mauricio Munoz MD  [facility]# [id number]      2023-02-28 15:27:[id number]    [facility]      Relevant imaging results reviewed and interpreted by me, discussed with the patient and / or family today.      Patient Instructions   Assessment:  Pio Navarro is a  48 y.o. male    with a chief complaint of Injury of the Left Knee (Patient is here today for L knee pain due to L knee injury at work. L knee stitch removal and F/U after L knee surgery. Pain is a 4/10.)  2 weeks s/p  left knee scope, medial menisectomy, excision of plica, and chondroplasty of MFC & patella on 10/10/2023  Post-op    Encounter Diagnoses   Name Primary?    Post-operative state Yes    S/P arthroscopic partial medial meniscectomy of left knee     S/P arthroscopic surgery of left knee     BMI 45.0-49.9, adult     Pre-diabetes     Hypertension, unspecified type     Chronic back pain, unspecified back location, unspecified back pain laterality     Primary osteoarthritis of left knee     Class 3 severe obesity with body mass index (BMI) of 45.0 to 49.9 in adult, unspecified obesity type, unspecified whether serious comorbidity present     Chondromalacia, left knee     Chronic pain of left knee       Plan:  Sutures removed  Images reviewed with the patient  Continue physical therapy at [facility] 2-3x weekly x 6-8 weeks  Continue aspirin x 21 days to prevent blood clots  Recommended to transition off of pain medicine to tylenol only.   Follow up in 4 weeks  Discussed return to work but with light duty- at this time the patient does not feel as he can return duty to his job duties    Follow-up: 4 weeks or sooner if there are any problems between now and then.    Leave Review:   Google: Leave Google Review  Healthgrades: Leave Healthgrades Review    After Hours Number: [phone]     Provider Note/Medical Decision Making:   At this time I discussed with the patient return to work status of light duty.  He is not on full duty he will need some seated work restrictions with minimal lifting.    I discussed worrisome and red flag signs and symptoms with the patient. The patient expressed understanding and agreed to alert me immediately or to go to the emergency room if they experience any of these.   Treatment plan was developed with input from the patient/family, and they expressed understanding and agreement with the plan. All questions were answered today.        Disclaimer: This note was prepared using a voice  recognition system and is likely to have sound alike errors within the text.

## 2023-10-23 NOTE — PATIENT INSTRUCTIONS
Assessment:  Pio Navarro is a  48 y.o. male    with a chief complaint of Injury of the Left Knee (Patient is here today for L knee pain due to L knee injury at work. L knee stitch removal and F/U after L knee surgery. Pain is a 4/10.)  2 weeks s/p left knee scope, medial menisectomy, excision of plica, and chondroplasty of MFC & patella on 10/10/2023  Post-op    Encounter Diagnoses   Name Primary?    Post-operative state Yes    S/P arthroscopic partial medial meniscectomy of left knee     S/P arthroscopic surgery of left knee     BMI 45.0-49.9, adult     Pre-diabetes     Hypertension, unspecified type     Chronic back pain, unspecified back location, unspecified back pain laterality     Primary osteoarthritis of left knee     Class 3 severe obesity with body mass index (BMI) of 45.0 to 49.9 in adult, unspecified obesity type, unspecified whether serious comorbidity present     Chondromalacia, left knee     Chronic pain of left knee       Plan:  Sutures removed  Images reviewed with the patient  Continue physical therapy at [facility] 2-3x weekly x 6-8 weeks  Continue aspirin x 21 days to prevent blood clots  Recommended to transition off of pain medicine to tylenol only.   Follow up in 4 weeks  Discussed return to work but with light duty- at this time the patient does not feel as he can return duty to his job duties    Follow-up: 4 weeks or sooner if there are any problems between now and then.    Leave Review:   Google: Leave Google Review  Healthgrades: Leave Healthgrades Review    After Hours Number: [phone]

## 2023-11-29 ENCOUNTER — OFFICE VISIT (OUTPATIENT)
Dept: SPORTS MEDICINE | Facility: CLINIC | Age: 48
End: 2023-11-29
Payer: COMMERCIAL

## 2023-11-29 VITALS — WEIGHT: 315 LBS | HEIGHT: 70 IN | BODY MASS INDEX: 45.1 KG/M2

## 2023-11-29 DIAGNOSIS — M25.562 CHRONIC PAIN OF LEFT KNEE: ICD-10-CM

## 2023-11-29 DIAGNOSIS — Z98.890 S/P ARTHROSCOPIC PARTIAL MEDIAL MENISCECTOMY OF LEFT KNEE: ICD-10-CM

## 2023-11-29 DIAGNOSIS — E66.01 CLASS 3 SEVERE OBESITY WITH BODY MASS INDEX (BMI) OF 45.0 TO 49.9 IN ADULT, UNSPECIFIED OBESITY TYPE, UNSPECIFIED WHETHER SERIOUS COMORBIDITY PRESENT: ICD-10-CM

## 2023-11-29 DIAGNOSIS — Z87.828 S/P ARTHROSCOPIC PARTIAL MEDIAL MENISCECTOMY OF LEFT KNEE: ICD-10-CM

## 2023-11-29 DIAGNOSIS — M94.262 CHONDROMALACIA, LEFT KNEE: ICD-10-CM

## 2023-11-29 DIAGNOSIS — Z98.890 S/P ARTHROSCOPIC SURGERY OF LEFT KNEE: ICD-10-CM

## 2023-11-29 DIAGNOSIS — G89.29 CHRONIC BACK PAIN, UNSPECIFIED BACK LOCATION, UNSPECIFIED BACK PAIN LATERALITY: ICD-10-CM

## 2023-11-29 DIAGNOSIS — R73.03 PRE-DIABETES: ICD-10-CM

## 2023-11-29 DIAGNOSIS — I10 HYPERTENSION, UNSPECIFIED TYPE: Primary | ICD-10-CM

## 2023-11-29 DIAGNOSIS — M54.9 CHRONIC BACK PAIN, UNSPECIFIED BACK LOCATION, UNSPECIFIED BACK PAIN LATERALITY: ICD-10-CM

## 2023-11-29 DIAGNOSIS — G89.29 CHRONIC PAIN OF LEFT KNEE: ICD-10-CM

## 2023-11-29 DIAGNOSIS — M17.12 PRIMARY OSTEOARTHRITIS OF LEFT KNEE: ICD-10-CM

## 2023-11-29 PROCEDURE — 3044F PR MOST RECENT HEMOGLOBIN A1C LEVEL <7.0%: ICD-10-PCS | Mod: CPTII,S$GLB,, | Performed by: PHYSICIAN ASSISTANT

## 2023-11-29 PROCEDURE — 99024 POSTOP FOLLOW-UP VISIT: CPT | Mod: S$GLB,,, | Performed by: PHYSICIAN ASSISTANT

## 2023-11-29 PROCEDURE — 1159F PR MEDICATION LIST DOCUMENTED IN MEDICAL RECORD: ICD-10-PCS | Mod: CPTII,S$GLB,, | Performed by: PHYSICIAN ASSISTANT

## 2023-11-29 PROCEDURE — 3044F HG A1C LEVEL LT 7.0%: CPT | Mod: CPTII,S$GLB,, | Performed by: PHYSICIAN ASSISTANT

## 2023-11-29 PROCEDURE — 99999 PR PBB SHADOW E&M-EST. PATIENT-LVL III: ICD-10-PCS | Mod: PBBFAC,,, | Performed by: PHYSICIAN ASSISTANT

## 2023-11-29 PROCEDURE — 1160F PR REVIEW ALL MEDS BY PRESCRIBER/CLIN PHARMACIST DOCUMENTED: ICD-10-PCS | Mod: CPTII,S$GLB,, | Performed by: PHYSICIAN ASSISTANT

## 2023-11-29 PROCEDURE — 1160F RVW MEDS BY RX/DR IN RCRD: CPT | Mod: CPTII,S$GLB,, | Performed by: PHYSICIAN ASSISTANT

## 2023-11-29 PROCEDURE — 1159F MED LIST DOCD IN RCRD: CPT | Mod: CPTII,S$GLB,, | Performed by: PHYSICIAN ASSISTANT

## 2023-11-29 PROCEDURE — 99024 PR POST-OP FOLLOW-UP VISIT: ICD-10-PCS | Mod: S$GLB,,, | Performed by: PHYSICIAN ASSISTANT

## 2023-11-29 PROCEDURE — 99999 PR PBB SHADOW E&M-EST. PATIENT-LVL III: CPT | Mod: PBBFAC,,, | Performed by: PHYSICIAN ASSISTANT

## 2023-11-29 NOTE — PROGRESS NOTES
Patient ID: Pio Navarro  YOB: 1975  MRN: [id number]    Chief Complaint: No chief complaint on file.      Referred By: Workers [employer]    History of Present Illness: Pio Navarro is a  48 y.o. male    with a chief complaint of No chief complaint on file.    camille Navarro presents today 7 weeks s/p left knee scope, medial menisectomy, excision of plica, and chondroplasty of MFC & patella on 10/10/2023. He presents FWB with no brace/assistive devices. The patient has continue physical therapy at [facility] as this time he has paused at this time waiting approval for more therapy. Currently doing home exercise program. Notices weakness and giving out in the knee worse with going down the stairs, also notices pain with kneeling on bending on that knee. Has swelling throughout the day.   Denies any fevers, chills, night sweats, and tingling.     Past Medical History:   Past Medical History:   Diagnosis Date    Hypertension     Pre-diabetes      Past Surgical History:   Procedure Laterality Date    ARTHROSCOPIC CHONDROPLASTY OF KNEE JOINT Left 10/10/2023    Procedure: ARTHROSCOPY, KNEE, WITH CHONDROPLASTY;  Surgeon: gO Roberts MD;  Location: [facility];  Service: Orthopedics;  Laterality: Left;    BACK SURGERY      HAND SURGERY      HERNIA REPAIR  11/2022    KNEE ARTHROSCOPY W/ MENISCECTOMY Left 10/10/2023    Procedure: ARTHROSCOPY, KNEE, WITH MENISCECTOMY;  Surgeon: Og Roberts MD;  Location: [facility] OR;  Service: Orthopedics;  Laterality: Left;    KNEE ARTHROSCOPY W/ PLICA EXCISION Left 10/10/2023    Procedure: EXCISION, PLICA, KNEE, ARTHROSCOPIC;  Surgeon: Og Roberts MD;  Location: [facility] OR;  Service: Orthopedics;  Laterality: Left;     Family History   Problem Relation Age of Onset    Cancer Mother     Cancer Father     No Known Problems Sister     No Known Problems Sister     Cancer Maternal Grandmother     Cancer Maternal Grandfather     Cancer Paternal Grandmother     Cancer Paternal  Grandfather      Social History     Socioeconomic History    Marital status:    Tobacco Use    Smoking status: Unknown   Substance and Sexual Activity    Alcohol use: Yes     Comment: 12 beers a year    Drug use: Never    Sexual activity: Yes     Medication List with Changes/Refills   Current Medications    ASPIRIN (ECOTRIN) 81 MG EC TABLET    Take 1 tablet (81 mg total) by mouth once daily. for 21 days    DOCUSATE SODIUM (COLACE) 100 MG CAPSULE    Take 1 capsule (100 mg total) by mouth 2 (two) times daily.    HYDROCODONE-ACETAMINOPHEN (NORCO) 5-325 MG PER TABLET    Take 1 tablet by mouth every 4 to 6 hours as needed for Pain.    METFORMIN (GLUCOPHAGE) 500 MG TABLET    Take 500 mg by mouth 2 (two) times daily with meals.    ONDANSETRON (ZOFRAN) 4 MG TABLET    Take 1 tablet (4 mg total) by mouth every 8 (eight) hours as needed for Nausea.    VALSARTAN-HYDROCHLOROTHIAZIDE (DIOVAN-HCT) 80-12.5 MG PER TABLET    Take by mouth.     Review of patient's allergies indicates:   Allergen Reactions    Egg Nausea And Vomiting     Review of Systems   Musculoskeletal:  Positive for arthritis, back pain, joint pain, joint swelling, muscle weakness and myalgias.       Physical Exam:   Body mass index is 48.75 kg/m².  There were no vitals filed for this visit.   GENERAL: Well appearing, appropriate for stated age, no acute distress.  CARDIOVASCULAR: Pulses regular by peripheral palpation.  PULMONARY: Respirations are even and non-labored.  NEURO: Awake, alert, and oriented x 3.  PSYCH: Mood & affect are appropriate.  HEENT: Head is normocephalic and atraumatic.    General    Nursing note and vitals reviewed.            Left Knee Exam  No signs of infection  Minimal effusion   Knee ROM full extension to 120 degrees flexion   All compartments are soft and compressible. Calf soft non-tender. Intact EHL, FHL, gastrocsoleus, and tibialis anterior. Sensation intact to light touch in superficial peroneal, deep peroneal, tibial,  sural, and saphenous nerve distributions. Foot warm and well perfused with capillary refill of less than 2 seconds and palpable pedal pulses.       Imaging:   XR Results:  Results for orders placed during the hospital encounter of 02/28/23    X-ray Knee Ortho Left with Flexion    Narrative  EXAM: XR KNEE ORTHO LEFT WITH FLEXION    CLINICAL INDICATION:   Pain in left knee    FINDINGS:  No comparison studies are available.  AP, oblique and sunrise views of both knees, as well as a lateral view of the left knee were submitted for interpretation.  Negative for left knee joint effusion.  There is a bone island within the left lateral and right medial femoral condyles.  Small left Fabella.    Alignment is satisfactory. No     fractures, dislocations, or erosive arthritic change.  Negative for radiopaque foreign bodies or air in the soft tissues.  Joint spaces are well-maintained.    Impression  1.  Negative for acute process involving the right or left knee.  2.  Incidental findings as noted above.    Finalized on: 2/28/2023 3:25 PM By:  Mauricio Munoz MD  [facility]# [id number]      2023-02-28 15:27:34.242    [facility]    Relevant imaging results reviewed and interpreted by me, discussed with the patient and / or family today.      Patient Instructions   Assessment:  Pio Navarro is a  48 y.o. male    with a chief complaint of No chief complaint on file.  7 weeks s/p left knee scope, medial menisectomy, excision of plica, and chondroplasty of MFC & patella on 10/10/2023   Post-op    Encounter Diagnoses   Name Primary?    Hypertension, unspecified type Yes    BMI 45.0-49.9, adult     Pre-diabetes     Chronic back pain, unspecified back location, unspecified back pain laterality     Chronic pain of left knee     S/P arthroscopic surgery of left knee     S/P arthroscopic partial medial meniscectomy of left knee     Primary osteoarthritis of left knee     Class 3 severe obesity with body mass index (BMI) of 45.0 to 49.9 in adult,  unspecified obesity type, unspecified whether serious comorbidity present     Chondromalacia, left knee       Plan:  Currently waiting for request on physical therapy at [place]  Has returned to work  Using tylenol as needed  Still doing home exercise program as prescribed by PT  Compressive knee brace today     Follow-up: 6 weeks with Dr. Og Roberts or sooner if there are any problems between now and then.    Leave Review:   Google: Leave Google Review  Healthgrades: Leave Healthgrades Review    After Hours Number: [phone]      Provider Note/Medical Decision Making:   Under my direction and supervision, 10 minutes were spent sizing, fitting, and educating regarding durable medical equipment by an assistant today.  CPT 22707.    I discussed worrisome and red flag signs and symptoms with the patient. The patient expressed understanding and agreed to alert me immediately or to go to the emergency room if they experience any of these.   Treatment plan was developed with input from the patient/family, and they expressed understanding and agreement with the plan. All questions were answered today.        Disclaimer: This note was prepared using a voice recognition system and is likely to have sound alike errors within the text.

## 2023-11-29 NOTE — PATIENT INSTRUCTIONS
Assessment:  Pio Navarro is a  48 y.o. male    with a chief complaint of No chief complaint on file.  7 weeks s/p left knee scope, medial menisectomy, excision of plica, and chondroplasty of MFC & patella on 10/10/2023   Post-op    Encounter Diagnoses   Name Primary?    Hypertension, unspecified type Yes    BMI 45.0-49.9, adult     Pre-diabetes     Chronic back pain, unspecified back location, unspecified back pain laterality     Chronic pain of left knee     S/P arthroscopic surgery of left knee     S/P arthroscopic partial medial meniscectomy of left knee     Primary osteoarthritis of left knee     Class 3 severe obesity with body mass index (BMI) of 45.0 to 49.9 in adult, unspecified obesity type, unspecified whether serious comorbidity present     Chondromalacia, left knee       Plan:  Currently waiting for request on physical therapy at [place]  Has returned to work  Using tylenol as needed  Still doing home exercise program as prescribed by PT  Compressive knee brace today     Follow-up: 6 weeks with Dr. gO Roberts or sooner if there are any problems between now and then.    Leave Review:   Google: Leave Google Review  Healthgrades: Leave Healthgrades Review    After Hours Number: [phone]

## 2024-01-10 ENCOUNTER — OFFICE VISIT (OUTPATIENT)
Dept: SPORTS MEDICINE | Facility: CLINIC | Age: 49
End: 2024-01-10
Payer: OTHER MISCELLANEOUS

## 2024-01-10 VITALS — WEIGHT: 315 LBS | HEIGHT: 70 IN | BODY MASS INDEX: 45.1 KG/M2

## 2024-01-10 DIAGNOSIS — E66.01 CLASS 3 SEVERE OBESITY WITH BODY MASS INDEX (BMI) OF 45.0 TO 49.9 IN ADULT, UNSPECIFIED OBESITY TYPE, UNSPECIFIED WHETHER SERIOUS COMORBIDITY PRESENT: ICD-10-CM

## 2024-01-10 DIAGNOSIS — Z98.890 S/P ARTHROSCOPIC PARTIAL MEDIAL MENISCECTOMY OF LEFT KNEE: ICD-10-CM

## 2024-01-10 DIAGNOSIS — Z87.828 S/P ARTHROSCOPIC PARTIAL MEDIAL MENISCECTOMY OF LEFT KNEE: ICD-10-CM

## 2024-01-10 DIAGNOSIS — Z98.890 S/P ARTHROSCOPIC SURGERY OF LEFT KNEE: ICD-10-CM

## 2024-01-10 DIAGNOSIS — M17.12 PRIMARY OSTEOARTHRITIS OF LEFT KNEE: Primary | ICD-10-CM

## 2024-01-10 PROCEDURE — 97110 THERAPEUTIC EXERCISES: CPT | Mod: GP,S$GLB,, | Performed by: ORTHOPAEDIC SURGERY

## 2024-01-10 PROCEDURE — 99214 OFFICE O/P EST MOD 30 MIN: CPT | Mod: 25,S$GLB,, | Performed by: ORTHOPAEDIC SURGERY

## 2024-01-10 PROCEDURE — 99999 PR PBB SHADOW E&M-EST. PATIENT-LVL III: CPT | Mod: PBBFAC,,, | Performed by: ORTHOPAEDIC SURGERY

## 2024-01-10 PROCEDURE — 20610 DRAIN/INJ JOINT/BURSA W/O US: CPT | Mod: LT,S$GLB,, | Performed by: ORTHOPAEDIC SURGERY

## 2024-01-10 RX ORDER — METHYLPREDNISOLONE ACETATE 80 MG/ML
80 INJECTION, SUSPENSION INTRA-ARTICULAR; INTRALESIONAL; INTRAMUSCULAR; SOFT TISSUE
Status: DISCONTINUED | OUTPATIENT
Start: 2024-01-10 | End: 2024-01-10 | Stop reason: HOSPADM

## 2024-01-10 RX ADMIN — METHYLPREDNISOLONE ACETATE 80 MG: 80 INJECTION, SUSPENSION INTRA-ARTICULAR; INTRALESIONAL; INTRAMUSCULAR; SOFT TISSUE at 09:01

## 2024-01-10 NOTE — PATIENT INSTRUCTIONS
Assessment:  Pio Navarro is a  48 y.o. male    with a chief complaint of Pain of the Left Knee  3 months  s/p left knee scope, medial menisectomy, excision of plica, and chondroplasty of MFC & patella on 10/10/2023   Continued medial knee pain    Encounter Diagnoses   Name Primary?    Primary osteoarthritis of left knee Yes    Class 3 severe obesity with body mass index (BMI) of 45.0 to 49.9 in adult, unspecified obesity type, unspecified whether serious comorbidity present     S/P arthroscopic surgery of left knee     S/P arthroscopic partial medial meniscectomy of left knee         Plan:  Had had issues getting approval with physical therapy, so we will change up his home exercise program and provided it today. 3x a week for minutes  At least 15 minutes were spent developing, teaching, and performing a home exercise program.  A written summary was provided and all questions were answered. This service was performed by Dr. Og Roberts and his assistant under his direction. CPT 68295-OX  Left knee CSI 2/2/40   Referral to Dr. Tijerina to discuss non-operative management of knee arthritis  Discussed possible viscosupplemetantion in the future    Although there is not a cure for arthritis, there are effective ways to improve symptoms.     Exercise & Activity:  I recommend low impact activities such as elliptical and bicycle   Walking is great for arthritis: https://www.Stand Offer.ISpottedYou.com/3-reasons-walking-with-knee-arthritis/  If walking long distances, I recommend good quality well-cushioned shoes. Varsity sports can help you find the right ones: https://www.varsityrunning.com/  Aquatic and pool therapy is often helpful because it lessens the impact on the joint, strengthens the leg and thigh muscles, and helps to control swelling.   Knee motion is important to the health of the knee.     Knee Braces:  A compression knee sleeve can help limit swelling and provide proprioceptive feedback.     Prescriptions &  Medications:  I do recommend formal physical therapy or at minimum a home exercise program.   Over the counter analgesic (pain-relieving) medications can help. Examples are Tylenol, Ibuprofen, and Aleve. Check with your primary care physician to make sure you don't have contra-indications to taking those medicines.  Some over the counter supplement solutions such as glucosamine and chondroitin may help with symptoms, although the evidence is mixed.    Healthy Lifestyle:  Excess body weight can have a negative impact on joint health and on pain. I recommend healthy lifestyle choices including nutrition and exercise that help reach and maintain an ideal body weight. Tips for Exercise: https://www.Subblime.Tracked.com/13-exercise-tips-for-a-healthier-you/  Some diets cause increased inflammation. I recommend a balanced wholesome diet including some foods such as olives that are shown to decrease inflammation. More diet information available here: https://www.Subblime.Tracked.com/8-best-foods-for-knee-arthritis/      Follow-up: With Dr. Tijerina for continued non-operative care or sooner if there are any problems between now and then.    Leave Review:   Google: Leave Google Review  Healthgrades: Leave Healthgrades Review    After Hours Number: [phone]

## 2024-01-10 NOTE — PROGRESS NOTES
Patient ID: Pio Navarro  YOB: 1975  MRN: [id number]    Chief Complaint: Pain of the Left Knee      Referred By: BARRIE/ POOJA    History of Present Illness: Pio Navarro is a  48 y.o. male    with a chief complaint of Pain of the Left Knee    Pio presents to the clinic 3 months s/p left knee scope, medial menisectomy, excision of plica, and chondroplasty of MFC & patella on 10/10/2023. The patient has been unable to complete physical therapy at [facility] due to approval. He has continued to  home exercise program. He reports he has been pain that comes and goes mainly with walking.     Recall from visit with Halle on 11/29/23:  Ramon presents today 7 weeks s/p left knee scope, medial menisectomy, excision of plica, and chondroplasty of MFC & patella on 10/10/2023. He presents FWB with no brace/assistive devices. The patient has continue physical therapy at [facility] as this time he has paused at this time waiting approval for more therapy. Currently doing home exercise program. Notices weakness and giving out in the knee worse with going down the stairs, also notices pain with kneeling on bending on that knee. Has swelling throughout the day.   Denies any fevers, chills, night sweats, and tingling.     Pain        Past Medical History:   Past Medical History:   Diagnosis Date    Hypertension     Pre-diabetes      Past Surgical History:   Procedure Laterality Date    ARTHROSCOPIC CHONDROPLASTY OF KNEE JOINT Left 10/10/2023    Procedure: ARTHROSCOPY, KNEE, WITH CHONDROPLASTY;  Surgeon: Og Roberts MD;  Location: [facility] OR;  Service: Orthopedics;  Laterality: Left;    BACK SURGERY      HAND SURGERY      HERNIA REPAIR  11/2022    KNEE ARTHROSCOPY W/ MENISCECTOMY Left 10/10/2023    Procedure: ARTHROSCOPY, KNEE, WITH MENISCECTOMY;  Surgeon: Og Roberts MD;  Location: [facility] OR;  Service: Orthopedics;  Laterality: Left;    KNEE ARTHROSCOPY W/ PLICA EXCISION Left 10/10/2023    Procedure:  EXCISION, PLICA, KNEE, ARTHROSCOPIC;  Surgeon: Og Roberts MD;  Location: [facility];  Service: Orthopedics;  Laterality: Left;     Family History   Problem Relation Age of Onset    Cancer Mother     Cancer Father     No Known Problems Sister     No Known Problems Sister     Cancer Maternal Grandmother     Cancer Maternal Grandfather     Cancer Paternal Grandmother     Cancer Paternal Grandfather      Social History     Socioeconomic History    Marital status:    Tobacco Use    Smoking status: Unknown   Substance and Sexual Activity    Alcohol use: Yes     Comment: 12 beers a year    Drug use: Never    Sexual activity: Yes     Medication List with Changes/Refills   Current Medications    ASPIRIN (ECOTRIN) 81 MG EC TABLET    Take 1 tablet (81 mg total) by mouth once daily. for 21 days    DOCUSATE SODIUM (COLACE) 100 MG CAPSULE    Take 1 capsule (100 mg total) by mouth 2 (two) times daily.    HYDROCODONE-ACETAMINOPHEN (NORCO) 5-325 MG PER TABLET    Take 1 tablet by mouth every 4 to 6 hours as needed for Pain.    METFORMIN (GLUCOPHAGE) 500 MG TABLET    Take 500 mg by mouth 2 (two) times daily with meals.    ONDANSETRON (ZOFRAN) 4 MG TABLET    Take 1 tablet (4 mg total) by mouth every 8 (eight) hours as needed for Nausea.    VALSARTAN-HYDROCHLOROTHIAZIDE (DIOVAN-HCT) 80-12.5 MG PER TABLET    Take by mouth.     Review of patient's allergies indicates:   Allergen Reactions    Egg Nausea And Vomiting     ROS    Physical Exam:   Body mass index is 48.64 kg/m².  There were no vitals filed for this visit.   GENERAL: Well appearing, appropriate for stated age, no acute distress.  CARDIOVASCULAR: Pulses regular by peripheral palpation.  PULMONARY: Respirations are even and non-labored.  NEURO: Awake, alert, and oriented x 3.  PSYCH: Mood & affect are appropriate.  HEENT: Head is normocephalic and atraumatic.              Left Knee Exam     Inspection   Scars: present  Effusion: absent    Tenderness   The patient  tender to palpation of the medial joint line and condyle.    Range of Motion   Extension:  0   Flexion:  110     Tests   Meniscus   Rodriguez:  Medial - positive   Stability   MCL - Valgus: normal (0 to 2mm)  LCL - Varus: normal (0 to 2mm)    Other   Sensation: normal    Comments:  Intact EHL, FHL, gastrocsoleus, and tibialis anterior. Sensation intact to light touch in superficial peroneal, deep peroneal, tibial, sural, and saphenous nerve distributions. Foot warm and well perfused with capillary refill of less than 2 seconds and palpable pedal pulses.      Muscle Strength   Left Lower Extremity   Hip Abduction: 5/5   Quadriceps:  4/5   Hamstrin/5     Vascular Exam       Left Pulses  Dorsalis Pedis:      2+  Posterior Tibial:      2+          Imaging:    MRI Knee Without Contrast Left  Narrative: EXAMINATION:  MRI KNEE WITHOUT CONTRAST LEFT    CLINICAL HISTORY:  Knee trauma, internal derangement suspected, xray done;Sprain of medial collateral ligament of left knee, initial encounter    TECHNIQUE:  Multiplanar, multisequence images were performed about the left knee.  No contrast was administered.    COMPARISON:  Left knee x-ray, 2023    FINDINGS:  Horizontal cleavage tear of the body and posterior horn of the medial meniscus exiting the undersurface.  Lateral meniscus intact.    ACL, PCL, medial collateral ligament, lateral collateral complex, popliteus tendon, and extensor mechanism intact.    Full-thickness chondral loss in the median patellar ridge with underlying subchondral marrow edema.  Articular cartilage in the medial and lateral compartments is fairly well preserved.  Trace joint effusion.  Tiny Baker's cyst.    No fracture or avascular necrosis or acute osteochondral lesion.  Impression: 1. Horizontal cleavage tear of the body and posterior horn medial meniscus.  2. Patellar chondromalacia with full-thickness chondral fissuring.  3. Trace joint effusion and tiny Baker cyst.    Electronically  signed by: Jose Small MD  Date:    03/08/2023  Time:    09:24      Relevant imaging results reviewed and interpreted by me, discussed with the patient and / or family today.     Other Tests:         Patient Instructions   Assessment:  Pio Navarro is a  48 y.o. male    with a chief complaint of Pain of the Left Knee  3 months  s/p left knee scope, medial menisectomy, excision of plica, and chondroplasty of MFC & patella on 10/10/2023   Continued medial knee pain    Encounter Diagnoses   Name Primary?    Primary osteoarthritis of left knee Yes    Class 3 severe obesity with body mass index (BMI) of 45.0 to 49.9 in adult, unspecified obesity type, unspecified whether serious comorbidity present     S/P arthroscopic surgery of left knee     S/P arthroscopic partial medial meniscectomy of left knee         Plan:  Had had issues getting approval with physical therapy, so we will change up his home exercise program and provided it today. 3x a week for minutes  At least 15 minutes were spent developing, teaching, and performing a home exercise program.  A written summary was provided and all questions were answered. This service was performed by Dr. Og Roberts and his assistant under his direction. CPT 72828-SM  Left knee CSI 2/2/40   Referral to Dr. Tijerina to discuss non-operative management of knee arthritis  Discussed possible viscosupplemetantion in the future    Although there is not a cure for arthritis, there are effective ways to improve symptoms.     Exercise & Activity:  I recommend low impact activities such as elliptical and bicycle   Walking is great for arthritis: https://www.Coupons Near Me.com/3-reasons-walking-with-knee-arthritis/  If walking long distances, I recommend good quality well-cushioned shoes. Varsity sports can help you find the right ones: https://www.varsityrunning.com/  Aquatic and pool therapy is often helpful because it lessens the impact on the joint, strengthens the leg and  thigh muscles, and helps to control swelling.   Knee motion is important to the health of the knee.     Knee Braces:  A compression knee sleeve can help limit swelling and provide proprioceptive feedback.     Prescriptions & Medications:  I do recommend formal physical therapy or at minimum a home exercise program.   Over the counter analgesic (pain-relieving) medications can help. Examples are Tylenol, Ibuprofen, and Aleve. Check with your primary care physician to make sure you don't have contra-indications to taking those medicines.  Some over the counter supplement solutions such as glucosamine and chondroitin may help with symptoms, although the evidence is mixed.    Healthy Lifestyle:  Excess body weight can have a negative impact on joint health and on pain. I recommend healthy lifestyle choices including nutrition and exercise that help reach and maintain an ideal body weight. Tips for Exercise: https://www.[employer]/13-exercise-tips-for-a-healthier-you/  Some diets cause increased inflammation. I recommend a balanced wholesome diet including some foods such as olives that are shown to decrease inflammation. More diet information available here: https://www.Inway Studios.Momail/8-best-foods-for-knee-arthritis/      Follow-up: With Dr. Tijerina for continued non-operative care or sooner if there are any problems between now and then.    Leave Review:   Google: Leave Google Review  Healthgrades: Leave Healthgrades Review    After Hours Number: [phone]                 Provider Note/Medical Decision Making:       I discussed worrisome and red flag signs and symptoms with the patient. The patient expressed understanding and agreed to alert me immediately or to go to the emergency room if they experience any of these.   Treatment plan was developed with input from the patient/family, and they expressed understanding and agreement with the plan. All questions were answered today.          Og SCHREIBER  MD [facility]  [facility]       Disclaimer: This note was prepared using a voice recognition system and is likely to have sound alike errors within the text.     I, Liyah Millard, acted as a scribe for Og Roberts MD for the duration of this office visit.

## 2024-01-10 NOTE — PROGRESS NOTES
Patient ID: Pio Navarro  YOB: 1975  MRN: [id number]    Chief Complaint: Pain of the Left Knee      Referred By: WC    History of Present Illness: Pio Navarro is a  48 y.o. male    with a chief complaint of Pain of the Left Knee  Pio presents to the clinic 3 months s/p left knee scope, medial menisectomy, excision of plica, and chondroplasty of MFC & patella on 10/10/2023. The patient has been unable to complete physical therapy at [facility]. He has continued to  home exercise program. He reports he has been pain that comes and goes mainly with walking.     Recall from visit with Halle on 11/29/23:  Ramon presents today 7 weeks s/p left knee scope, medial menisectomy, excision of plica, and chondroplasty of MFC & patella on 10/10/2023. He presents FWB with no brace/assistive devices. The patient has continue physical therapy at [facility]- as this time he has paused at this time waiting approval for more therapy. Currently doing home exercise program. Notices weakness and giving out in the knee worse with going down the stairs, also notices pain with kneeling on bending on that knee. Has swelling throughout the day.   Denies any fevers, chills, night sweats, and tingling.     HPI    Past Medical History:   Past Medical History:   Diagnosis Date    Hypertension     Pre-diabetes      Past Surgical History:   Procedure Laterality Date    ARTHROSCOPIC CHONDROPLASTY OF KNEE JOINT Left 10/10/2023    Procedure: ARTHROSCOPY, KNEE, WITH CHONDROPLASTY;  Surgeon: Og Roberts MD;  Location: [facility];  Service: Orthopedics;  Laterality: Left;    BACK SURGERY      HAND SURGERY      HERNIA REPAIR  11/2022    KNEE ARTHROSCOPY W/ MENISCECTOMY Left 10/10/2023    Procedure: ARTHROSCOPY, KNEE, WITH MENISCECTOMY;  Surgeon: Og Roberts MD;  Location: [facility] OR;  Service: Orthopedics;  Laterality: Left;    KNEE ARTHROSCOPY W/ PLICA EXCISION Left 10/10/2023    Procedure: EXCISION, PLICA, KNEE,  ARTHROSCOPIC;  Surgeon: Og Roberts MD;  Location: [facility];  Service: Orthopedics;  Laterality: Left;     Family History   Problem Relation Age of Onset    Cancer Mother     Cancer Father     No Known Problems Sister     No Known Problems Sister     Cancer Maternal Grandmother     Cancer Maternal Grandfather     Cancer Paternal Grandmother     Cancer Paternal Grandfather      Social History     Socioeconomic History    Marital status:    Tobacco Use    Smoking status: Unknown   Substance and Sexual Activity    Alcohol use: Yes     Comment: 12 beers a year    Drug use: Never    Sexual activity: Yes     Medication List with Changes/Refills   Current Medications    ASPIRIN (ECOTRIN) 81 MG EC TABLET    Take 1 tablet (81 mg total) by mouth once daily. for 21 days    DOCUSATE SODIUM (COLACE) 100 MG CAPSULE    Take 1 capsule (100 mg total) by mouth 2 (two) times daily.    HYDROCODONE-ACETAMINOPHEN (NORCO) 5-325 MG PER TABLET    Take 1 tablet by mouth every 4 to 6 hours as needed for Pain.    METFORMIN (GLUCOPHAGE) 500 MG TABLET    Take 500 mg by mouth 2 (two) times daily with meals.    ONDANSETRON (ZOFRAN) 4 MG TABLET    Take 1 tablet (4 mg total) by mouth every 8 (eight) hours as needed for Nausea.    VALSARTAN-HYDROCHLOROTHIAZIDE (DIOVAN-HCT) 80-12.5 MG PER TABLET    Take by mouth.     Review of patient's allergies indicates:   Allergen Reactions    Egg Nausea And Vomiting     ROS    Physical Exam:   Body mass index is 48.64 kg/m².  There were no vitals filed for this visit.   GENERAL: Well appearing, appropriate for stated age, no acute distress.  CARDIOVASCULAR: Pulses regular by peripheral palpation.  PULMONARY: Respirations are even and non-labored.  NEURO: Awake, alert, and oriented x 3.  PSYCH: Mood & affect are appropriate.  HEENT: Head is normocephalic and atraumatic.  Ortho/SPM Exam  ***    Imaging:    MRI Knee Without Contrast Left  Narrative: EXAMINATION:  MRI KNEE WITHOUT CONTRAST  LEFT    CLINICAL HISTORY:  Knee trauma, internal derangement suspected, xray done;Sprain of medial collateral ligament of left knee, initial encounter    TECHNIQUE:  Multiplanar, multisequence images were performed about the left knee.  No contrast was administered.    COMPARISON:  Left knee x-ray, 02/28/2023    FINDINGS:  Horizontal cleavage tear of the body and posterior horn of the medial meniscus exiting the undersurface.  Lateral meniscus intact.    ACL, PCL, medial collateral ligament, lateral collateral complex, popliteus tendon, and extensor mechanism intact.    Full-thickness chondral loss in the median patellar ridge with underlying subchondral marrow edema.  Articular cartilage in the medial and lateral compartments is fairly well preserved.  Trace joint effusion.  Tiny Baker's cyst.    No fracture or avascular necrosis or acute osteochondral lesion.  Impression: 1. Horizontal cleavage tear of the body and posterior horn medial meniscus.  2. Patellar chondromalacia with full-thickness chondral fissuring.  3. Trace joint effusion and tiny Baker cyst.    Electronically signed by: Jose Small MD  Date:    03/08/2023  Time:    09:24    ***  Relevant imaging results reviewed and interpreted by me, discussed with the patient and / or family today. ***    Other Tests:     ***    There are no Patient Instructions on file for this visit.  Provider Note/Medical Decision Making: ***      I discussed worrisome and red flag signs and symptoms with the patient. The patient expressed understanding and agreed to alert me immediately or to go to the emergency room if they experience any of these.   Treatment plan was developed with input from the patient/family, and they expressed understanding and agreement with the plan. All questions were answered today.          Og Roberts MD  Orthopaedic Surgery & Sports Medicine       Disclaimer: This note was prepared using a voice recognition system and is likely to  have sound alike errors within the text.

## 2024-01-10 NOTE — PROCEDURES
Large Joint Aspiration/Injection: L knee    Date/Time: 1/10/2024 9:00 AM    Performed by: Og Roberts MD  Authorized by: Og Roberts MD    Consent Done?:  Yes (Verbal)  Indications:  Joint swelling and pain  Site marked: the procedure site was marked    Timeout: prior to procedure the correct patient, procedure, and site was verified    Prep: patient was prepped and draped in usual sterile fashion      Local anesthesia used?: Yes    Anesthesia:  Local infiltration  Local anesthetic:  Bupivacaine 0.5% without epinephrine, lidocaine 1% without epinephrine and topical anesthetic    Details:  Needle Size:  22 G  Ultrasonic Guidance for needle placement?: No    Approach:  Superior  Location:  Knee  Site:  L knee  Medications:  80 mg methylPREDNISolone acetate 80 mg/mL  Patient tolerance:  Patient tolerated the procedure well with no immediate complications     2cc 1% lidocaine plain, 2cc 0.5% marcaine plain, 0.5cc 80mg methylprednisolone    Procedure Note:  We discussed the risk and benefits of injections, including pain, infection, bleeding, damage to adjacent structures, risk of reaction to injection. We discussed the steroid/cortisone injections will not heal the problem but mat help decrease inflammation and help with symptoms. We discussed the risk of repeated injections. The patient expressed understanding and wanted to proceed with the injection. We performed a timeout to verify the proper patient, proper procedure, and the proper site. The injection site was prepared in a sterile fashion. The patient tolerated it well and there were no complication. We did discuss with the patient that steroid injections can cause some increase in blood sugar and blood pressure for up to a week after the injection.

## 2024-02-28 NOTE — PROGRESS NOTES
Patient ID: Pio Navarro  YOB: 1975  MRN: [id number]    Chief Complaint: Pain of the Left Knee    Referred By: Og Roberts MD    Occupation:       History of Present Illness: Pio Navarro is a 48 y.o. male who presents today with Pain of the Left Knee    He is an established patient that was last seen in our office on 7/6/23.  He was treated initially for MCL sprain and referred for an MRI which demonstrated a tear of the medial meniscus.  He was referred to Dr. Og Roberts and underwent left knee arthroscopy, medial menisectomy, excision of plica, and chondroplasty of MFC & patella on 10/10/2023.  He has complained of persistent left knee pain following this procedure.  He has been unable to continue physical therapy recently due to lack of insurance approval.  He is referred to discuss viscosupplementation injections.    Past Medical History:   Past Medical History:   Diagnosis Date    Hypertension     Pre-diabetes      Past Surgical History:   Procedure Laterality Date    ARTHROSCOPIC CHONDROPLASTY OF KNEE JOINT Left 10/10/2023    Procedure: ARTHROSCOPY, KNEE, WITH CHONDROPLASTY;  Surgeon: Og Roberts MD;  Location: [facility];  Service: Orthopedics;  Laterality: Left;    BACK SURGERY      HAND SURGERY      HERNIA REPAIR  11/2022    KNEE ARTHROSCOPY W/ MENISCECTOMY Left 10/10/2023    Procedure: ARTHROSCOPY, KNEE, WITH MENISCECTOMY;  Surgeon: Og Roberts MD;  Location: [facility] OR;  Service: Orthopedics;  Laterality: Left;    KNEE ARTHROSCOPY W/ PLICA EXCISION Left 10/10/2023    Procedure: EXCISION, PLICA, KNEE, ARTHROSCOPIC;  Surgeon: Og Roberts MD;  Location: [facility];  Service: Orthopedics;  Laterality: Left;     Family History   Problem Relation Age of Onset    Cancer Mother     Cancer Father     No Known Problems Sister     No Known Problems Sister     Cancer Maternal Grandmother     Cancer Maternal Grandfather     Cancer Paternal Grandmother     Cancer Paternal  Grandfather      Social History     Socioeconomic History    Marital status:    Tobacco Use    Smoking status: Unknown   Substance and Sexual Activity    Alcohol use: Yes     Comment: 12 beers a year    Drug use: Never    Sexual activity: Yes     Medication List with Changes/Refills   Current Medications    HYDROCODONE-ACETAMINOPHEN (NORCO) 5-325 MG PER TABLET    Take 1 tablet by mouth every 4 to 6 hours as needed for Pain.    METFORMIN (GLUCOPHAGE) 500 MG TABLET    Take 500 mg by mouth 2 (two) times daily with meals.    VALSARTAN-HYDROCHLOROTHIAZIDE (DIOVAN-HCT) 80-12.5 MG PER TABLET    Take by mouth.   Discontinued Medications    ASPIRIN (ECOTRIN) 81 MG EC TABLET    Take 1 tablet (81 mg total) by mouth once daily. for 21 days    DOCUSATE SODIUM (COLACE) 100 MG CAPSULE    Take 1 capsule (100 mg total) by mouth 2 (two) times daily.    ONDANSETRON (ZOFRAN) 4 MG TABLET    Take 1 tablet (4 mg total) by mouth every 8 (eight) hours as needed for Nausea.     Review of patient's allergies indicates:   Allergen Reactions    Egg Nausea And Vomiting       Physical Exam:   There is no height or weight on file to calculate BMI.    Physical Exam  Detailed MSK exam:     Left Knee:  Inspection: No effusion, erythema, or ecchymosis   Palpation tenderness: Medial and lateral joint line  Range of motion: 0 deg extension - 130 deg flexion  Strength:  5/5 Extension    5/5 Flexion  Stability: Stable ACL/Lachman      Stable Posterior Drawer      1+ with firm endpoint to Valgus Stress      Stable Varus Stress  Patella Exam: Negative J-sign   Negative Patellar apprehension   Positive Patellar crepitus   N/V Exam:  Tibial:    Normal sensory (plantar foot)  Normal motor (FHL)    Sup Peroneal:   Normal sensory (dorsal foot)  Normal motor (Peroneals)            Deep Peroneal:   Normal sensory (1st web space)  Normal motor (EHL)    Sural:   Normal sensory (lateral foot)   Saphenous:   Normal sensory (medial lower leg)   Normal pedal  pulses, warm and well perfused with capillary refill < 2 sec        Imaging:   XR Results:  Results for orders placed during the hospital encounter of 02/28/23    X-ray Knee Ortho Left with Flexion    Narrative  EXAM: XR KNEE ORTHO LEFT WITH FLEXION    CLINICAL INDICATION:   Pain in left knee    FINDINGS:  No comparison studies are available.  AP, oblique and sunrise views of both knees, as well as a lateral view of the left knee were submitted for interpretation.  Negative for left knee joint effusion.  There is a bone island within the left lateral and right medial femoral condyles.  Small left Fabella.    Alignment is satisfactory. No     fractures, dislocations, or erosive arthritic change.  Negative for radiopaque foreign bodies or air in the soft tissues.  Joint spaces are well-maintained.    Impression  1.  Negative for acute process involving the right or left knee.  2.  Incidental findings as noted above.    Finalized on: 2/28/2023 3:25 PM By:  Mauricio Munoz MD  [facility]# [id number]      2023-02-28 15:27:[id number]    [facility]      MRI Results:  Results for orders placed during the hospital encounter of 03/08/23    MRI Knee Without Contrast Left    Narrative  EXAMINATION:  MRI KNEE WITHOUT CONTRAST LEFT    CLINICAL HISTORY:  Knee trauma, internal derangement suspected, xray done;Sprain of medial collateral ligament of left knee, initial encounter    TECHNIQUE:  Multiplanar, multisequence images were performed about the left knee.  No contrast was administered.    COMPARISON:  Left knee x-ray, 02/28/2023    FINDINGS:  Horizontal cleavage tear of the body and posterior horn of the medial meniscus exiting the undersurface.  Lateral meniscus intact.    ACL, PCL, medial collateral ligament, lateral collateral complex, popliteus tendon, and extensor mechanism intact.    Full-thickness chondral loss in the median patellar ridge with underlying subchondral marrow edema.  Articular cartilage in the medial and lateral compartments  is fairly well preserved.  Trace joint effusion.  Tiny Baker's cyst.    No fracture or avascular necrosis or acute osteochondral lesion.    Impression  1. Horizontal cleavage tear of the body and posterior horn medial meniscus.  2. Patellar chondromalacia with full-thickness chondral fissuring.  3. Trace joint effusion and tiny Baker cyst.      Electronically signed by: Jose Small MD  Date:    03/08/2023  Time:    09:24      This was discussed with the patient and / or family today.     Patient Instructions   Assessment:  Pio Navarro is a 48 y.o. male with a chief complaint of Pain of the Left Knee    Encounter Diagnoses   Name Primary?    Primary osteoarthritis of left knee Yes    Chronic pain of left knee     Chondromalacia, left knee     Injury of articular cartilage of left knee     S/P arthroscopic surgery of left knee     S/P arthroscopic partial medial meniscectomy of left knee       Plan:  Unfortunately, patient continues to have chronic left knee pain.  Previously with me, undergone corticosteroid injection and viscosupplementation injection with Durolane, without significant, sustained relief.  Had not improved with home exercises or therapy.  MRI was obtained showing tearing throughout the medial meniscus, as well as full-thickness chondral fissuring.  He was referred to Dr. Og Roberts, underwent arthroscopic surgery, with a partial medial meniscectomy, plica excision, and chondroplasty.  He is continued to have left knee pain thereafter, despite rehab, PT, and home exercises.  Underwent a repeat corticosteroid injection about 6 weeks ago, which provided good relief, but only lasted a couple of weeks, it has been referred back to us for continued nonoperative management of the knee.  Recommend to try again with a viscosupplementation injection, given lack of response with Durolane, we will try an alternative viscosupplementation series, Synvisc 3 shot series.    MEDICAL NECESSITY FOR  VISCOSUPPLEMENTATION: After thorough evaluation of the patient, I have determined that visco-supplementation is medically necessary. The patient has painful DJD of the knee with failure of conservative therapy including lifestyle modifications and rehabilitation exercises. Oral analgesis/NSAIDs have not adequately controlled symptoms and there is radiographic evidence of joint space narrowing, subchondral sclerosis, and some early osteophytic changes, or in lack of radiographic evidence, there is arthroscopic or other evidence of chondrosis.  Kellgren- Pelon grade 2 or greater.  Given the full-thickness cartilage injury, chondromalacia, patient would be a good candidate to try PRP as well.  Especially since we are trying to exhaust nonoperative options.  Recommend leukocyte poor PRP with Arthrex ACP max protocol, which can be administered at the same time as viscosupplementation injection.  Peer-reviewed literature has demonstrated the efficacy of leukocyte-poor platelet-rich plasma (PRP) for knee arthritis. PRP is known to decrease inflammatory enzymes in the knee (UDAY Huntley, 2016). It also provides grow factors that stimulate stem cells and cartilage cells (Cory et al, J Knee Surg, 2018). Research suggests that in some cases it is as effective as stem cell injections. PRP has been shown to be more effective for knee arthritis than corticosteroid injections or viscosupplementation in terms of knee pain and function.  PRP has been shown to be more cost-effective than other forms of treatment for knee arthritis. (Radha et al, IJCR 2018)  Research has also shown that PRP + viscosupplementation may be synergistic. In fact, chondrocytes cultured with both PRP and HA showed increased proliferation rates and glycosaminoglycan contents compared with those cultured with HA alone, suggesting that the combined injection may provide a more therapeutic environment (Jeromy et al., Arthroscopy, 2018).   Continue home  exercise program.  Depending on results with Visco and PRP, may consider for targeting the genicular nerves as well with cryoneurolysis with Iovera.    Follow-up:  4 weeks for Synvisc with PRP or sooner if there are any problems between now and then.    Thank you for choosing [facility] and Dr. Jamaal Tijerina for your orthopedic & sports medicine care. It is our goal to provide you with exceptional care that will help keep you healthy, active, and get you back in the game.    Please do not hesitate to reach out to us via email, phone, or MyChart with any questions, concerns, or feedback.    If you are experiencing pain/discomfort ,or have questions after 5pm and would like to be connected to the [facility] on-call team, please call this number and specify which Sports Medicine provider is treating you: [phone]       A copy of today's visit note has been sent to the referring provider.           Jamaal Tijerina MD  Primary Care Sports Medicine    Disclaimer: This note was prepared using a voice recognition system and is likely to have sound alike errors within the text.

## 2024-02-29 ENCOUNTER — OFFICE VISIT (OUTPATIENT)
Dept: SPORTS MEDICINE | Facility: CLINIC | Age: 49
End: 2024-02-29
Payer: OTHER MISCELLANEOUS

## 2024-02-29 DIAGNOSIS — Z87.828 S/P ARTHROSCOPIC PARTIAL MEDIAL MENISCECTOMY OF LEFT KNEE: ICD-10-CM

## 2024-02-29 DIAGNOSIS — Z98.890 S/P ARTHROSCOPIC SURGERY OF LEFT KNEE: ICD-10-CM

## 2024-02-29 DIAGNOSIS — M25.562 CHRONIC PAIN OF LEFT KNEE: ICD-10-CM

## 2024-02-29 DIAGNOSIS — M94.262 CHONDROMALACIA, LEFT KNEE: ICD-10-CM

## 2024-02-29 DIAGNOSIS — S89.82XA INJURY OF ARTICULAR CARTILAGE OF LEFT KNEE: ICD-10-CM

## 2024-02-29 DIAGNOSIS — Z98.890 S/P ARTHROSCOPIC PARTIAL MEDIAL MENISCECTOMY OF LEFT KNEE: ICD-10-CM

## 2024-02-29 DIAGNOSIS — M17.12 PRIMARY OSTEOARTHRITIS OF LEFT KNEE: Primary | ICD-10-CM

## 2024-02-29 DIAGNOSIS — G89.29 CHRONIC PAIN OF LEFT KNEE: ICD-10-CM

## 2024-02-29 PROCEDURE — 99999 PR PBB SHADOW E&M-EST. PATIENT-LVL II: CPT | Mod: PBBFAC,,, | Performed by: STUDENT IN AN ORGANIZED HEALTH CARE EDUCATION/TRAINING PROGRAM

## 2024-02-29 PROCEDURE — 99214 OFFICE O/P EST MOD 30 MIN: CPT | Mod: S$GLB,,, | Performed by: STUDENT IN AN ORGANIZED HEALTH CARE EDUCATION/TRAINING PROGRAM

## 2024-02-29 NOTE — PATIENT INSTRUCTIONS
Assessment:  Pio Navarro is a 48 y.o. male with a chief complaint of Pain of the Left Knee    Encounter Diagnoses   Name Primary?    Primary osteoarthritis of left knee Yes    Chronic pain of left knee     Chondromalacia, left knee     Injury of articular cartilage of left knee     S/P arthroscopic surgery of left knee     S/P arthroscopic partial medial meniscectomy of left knee       Plan:  Unfortunately, patient continues to have chronic left knee pain.  Previously with me, undergone corticosteroid injection and viscosupplementation injection with Durolane, without significant, sustained relief.  Had not improved with home exercises or therapy.  MRI was obtained showing tearing throughout the medial meniscus, as well as full-thickness chondral fissuring.  He was referred to Dr. Og Roberts, underwent arthroscopic surgery, with a partial medial meniscectomy, plica excision, and chondroplasty.  He is continued to have left knee pain thereafter, despite rehab, PT, and home exercises.  Underwent a repeat corticosteroid injection about 6 weeks ago, which provided good relief, but only lasted a couple of weeks, it has been referred back to us for continued nonoperative management of the knee.  Recommend to try again with a viscosupplementation injection, given lack of response with Durolane, we will try an alternative viscosupplementation series, Synvisc 3 shot series.    MEDICAL NECESSITY FOR VISCOSUPPLEMENTATION: After thorough evaluation of the patient, I have determined that visco-supplementation is medically necessary. The patient has painful DJD of the knee with failure of conservative therapy including lifestyle modifications and rehabilitation exercises. Oral analgesis/NSAIDs have not adequately controlled symptoms and there is radiographic evidence of joint space narrowing, subchondral sclerosis, and some early osteophytic changes, or in lack of radiographic evidence, there is arthroscopic or other evidence  of chondrosis.  Kellgren- Pelon grade 2 or greater.  Given the full-thickness cartilage injury, chondromalacia, patient would be a good candidate to try PRP as well.  Especially since we are trying to exhaust nonoperative options.  Recommend leukocyte poor PRP with Arthrex ACP max protocol, which can be administered at the same time as viscosupplementation injection.  Peer-reviewed literature has demonstrated the efficacy of leukocyte-poor platelet-rich plasma (PRP) for knee arthritis. PRP is known to decrease inflammatory enzymes in the knee (UDAY Huntley, 2016). It also provides grow factors that stimulate stem cells and cartilage cells (Cory et al, J Knee Surg, 2018). Research suggests that in some cases it is as effective as stem cell injections. PRP has been shown to be more effective for knee arthritis than corticosteroid injections or viscosupplementation in terms of knee pain and function.  PRP has been shown to be more cost-effective than other forms of treatment for knee arthritis. (Radha et al, IJCR 2018)  Research has also shown that PRP + viscosupplementation may be synergistic. In fact, chondrocytes cultured with both PRP and HA showed increased proliferation rates and glycosaminoglycan contents compared with those cultured with HA alone, suggesting that the combined injection may provide a more therapeutic environment (Jeromy et al., Arthroscopy, 2018).   Continue home exercise program.  Depending on results with Visco and PRP, may consider for targeting the genicular nerves as well with cryoneurolysis with Iovera.    Follow-up:  4 weeks for Synvisc with PRP or sooner if there are any problems between now and then.    Thank you for choosing [facility] and Dr. Jamaal Tijerina for your orthopedic & sports medicine care. It is our goal to provide you with exceptional care that will help keep you healthy, active, and get you back in the game.    Please do not hesitate to reach  out to us via email, phone, or MyChart with any questions, concerns, or feedback.    If you are experiencing pain/discomfort ,or have questions after 5pm and would like to be connected to the [facility] on-call team, please call this number and specify which Sports Medicine provider is treating you: [phone]

## 2024-03-27 ENCOUNTER — TELEPHONE (OUTPATIENT)
Dept: SPORTS MEDICINE | Facility: CLINIC | Age: 49
End: 2024-03-27
Payer: COMMERCIAL

## 2024-03-27 NOTE — TELEPHONE ENCOUNTER
Called pt regarding WC auth pending for upcoming injection appointments. Pt claimed he was unaware of any appointments having been scheduled.  Notified him of appointment dates/times scheduled at his last visit and which appointments would need to be rescheduled.  Assisted pt with moving appointments accordingly.    Pt verbalized understanding of updated appointment dates and times. Pt aware tomorrow's appointment canceled

## 2024-04-08 ENCOUNTER — PATIENT MESSAGE (OUTPATIENT)
Dept: SPORTS MEDICINE | Facility: CLINIC | Age: 49
End: 2024-04-08
Payer: COMMERCIAL

## 2024-04-09 ENCOUNTER — OFFICE VISIT (OUTPATIENT)
Dept: SPORTS MEDICINE | Facility: CLINIC | Age: 49
End: 2024-04-09
Payer: OTHER MISCELLANEOUS

## 2024-04-09 DIAGNOSIS — S89.82XA INJURY OF ARTICULAR CARTILAGE OF LEFT KNEE: ICD-10-CM

## 2024-04-09 DIAGNOSIS — M17.12 PRIMARY OSTEOARTHRITIS OF LEFT KNEE: ICD-10-CM

## 2024-04-09 DIAGNOSIS — G89.29 CHRONIC PAIN OF LEFT KNEE: ICD-10-CM

## 2024-04-09 DIAGNOSIS — M25.562 CHRONIC PAIN OF LEFT KNEE: ICD-10-CM

## 2024-04-09 PROCEDURE — 0232T NJX PLATELET PLASMA: CPT | Mod: 59,S$GLB,, | Performed by: STUDENT IN AN ORGANIZED HEALTH CARE EDUCATION/TRAINING PROGRAM

## 2024-04-09 PROCEDURE — 20610 DRAIN/INJ JOINT/BURSA W/O US: CPT | Mod: LT,S$GLB,, | Performed by: STUDENT IN AN ORGANIZED HEALTH CARE EDUCATION/TRAINING PROGRAM

## 2024-04-09 PROCEDURE — 99999 PR PBB SHADOW E&M-EST. PATIENT-LVL II: CPT | Mod: PBBFAC,,, | Performed by: STUDENT IN AN ORGANIZED HEALTH CARE EDUCATION/TRAINING PROGRAM

## 2024-04-09 PROCEDURE — 99499 UNLISTED E&M SERVICE: CPT | Mod: S$GLB,,, | Performed by: STUDENT IN AN ORGANIZED HEALTH CARE EDUCATION/TRAINING PROGRAM

## 2024-04-09 NOTE — PROCEDURES
Large Joint Aspiration/Injection: L knee    Date/Time: 4/9/2024 11:20 AM    Performed by: Jamaal Tijerina MD  Authorized by: Jamaal Tijerina MD    Consent Done?:  Yes (Verbal)  Indications:  Pain, joint swelling and arthritis  Site marked: the procedure site was marked    Timeout: prior to procedure the correct patient, procedure, and site was verified      Local anesthesia used?: Yes    Local anesthetic:  Topical anesthetic  Ultrasonic Guidance for needle placement?: Yes    Images are saved and documented.  Approach:  Lateral  Location:  Knee  Site:  L knee  Medications:  16 mg hyaluronate 16 mg/2 mL  Patient tolerance:  Patient tolerated the procedure well with no immediate complications     Ultrasound guidance was used for needle localization. Images were saved and stored for documentation. The appropriate structures were visualized. Dynamic visualization of the needle was continuous throughout the procedures and maintained good position.     We discussed the proper protocols after the injection such as no submerging pools, baths tubs, or hot tubs for 24 hr.  Showering is okay today.  We also discussed that blood sugars can be elevated after an injection and asked patient to properly check their sugars over the next few days and contact their PCP if there are any concerns.  We discussed red flags such as fevers, chills, red, warm, tender joint at the area of injection to please seek medical care immediately.

## 2024-04-09 NOTE — PROGRESS NOTES
We discussed the risks and benefits of injection, including pain, infection, bleeding, damage to adjacent structures, risks of reaction to injection. The patient expressed understanding and wanted to proceed with the injection. We performed a timeout to verify the proper patient, the proper procedure, and the proper site. The injection site was prepared in sterile fashion. 53cc of blood was obtained from the patient through standard venous access under sterile conditions. The blood was prepared per Arth[employer] ACP-Max protocol. 4mL of the leukocyte poor platelet rich plasma was then injected into the left knee using the superolateral approach. The patient tolerated the procedure well and there were no complications. I provided the patient with proper post-injection instructions, including activity restrictions per our protocol. The patient expressed understanding.    Diagnosis:   1. Primary osteoarthritis of left knee    2. Chronic pain of left knee    3. Injury of articular cartilage of left knee          Jamaal Tijerina MD

## 2024-04-18 ENCOUNTER — OFFICE VISIT (OUTPATIENT)
Dept: SPORTS MEDICINE | Facility: CLINIC | Age: 49
End: 2024-04-18
Payer: OTHER MISCELLANEOUS

## 2024-04-18 DIAGNOSIS — G89.29 CHRONIC PAIN OF LEFT KNEE: ICD-10-CM

## 2024-04-18 DIAGNOSIS — M17.12 PRIMARY OSTEOARTHRITIS OF LEFT KNEE: Primary | ICD-10-CM

## 2024-04-18 DIAGNOSIS — M25.562 CHRONIC PAIN OF LEFT KNEE: ICD-10-CM

## 2024-04-18 PROCEDURE — 99499 UNLISTED E&M SERVICE: CPT | Mod: S$GLB,,, | Performed by: STUDENT IN AN ORGANIZED HEALTH CARE EDUCATION/TRAINING PROGRAM

## 2024-04-18 PROCEDURE — 99999 PR PBB SHADOW E&M-EST. PATIENT-LVL II: CPT | Mod: PBBFAC,,, | Performed by: STUDENT IN AN ORGANIZED HEALTH CARE EDUCATION/TRAINING PROGRAM

## 2024-04-18 PROCEDURE — 20611 DRAIN/INJ JOINT/BURSA W/US: CPT | Mod: LT,S$GLB,, | Performed by: STUDENT IN AN ORGANIZED HEALTH CARE EDUCATION/TRAINING PROGRAM

## 2024-04-18 NOTE — PATIENT INSTRUCTIONS
Assessment:  Pio Navarro is a 48 y.o. male with a chief complaint of No chief complaint on file.    Encounter Diagnoses   Name Primary?    Primary osteoarthritis of left knee Yes    Chronic pain of left knee       Plan:  Knee Synvisc injection today, #2/3.    Proper protocols after the aspiration included: no submerging pools, baths tubs, or hot tubs for 24 hr.  Showering is okay today.  Red flag symptoms include fever, chills, nausea, vomiting, red, warm, tender joint at the area of injection.  If you are noticing these symptoms, they may be indicative of an infection, and please seek medical care immediately, either by calling our clinic or going to the emergency room.    Follow-up:  1 week for Synvisc injection #3/3 or sooner if there are any problems between now and then.    Thank you for choosing [facility] and Dr. Jamaal Tijerina for your orthopedic & sports medicine care. It is our goal to provide you with exceptional care that will help keep you healthy, active, and get you back in the game.    Please do not hesitate to reach out to us via email, phone, or MyChart with any questions, concerns, or feedback.    If you are experiencing pain/discomfort ,or have questions after 5pm and would like to be connected to the [facility] on-call team, please call this number and specify which Sports Medicine provider is treating you: [phone]

## 2024-04-18 NOTE — PROCEDURES
Large Joint Aspiration/Injection: L knee Synvisc #2/3    Date/Time: 4/18/2024 11:40 AM    Performed by: Jamaal Tijerina MD  Authorized by: Jamaal Tijerina MD    Consent Done?:  Yes (Verbal)  Indications:  Pain and joint swelling  Site marked: the procedure site was marked    Timeout: prior to procedure the correct patient, procedure, and site was verified      Local anesthesia used?: Yes    Local anesthetic:  Topical anesthetic  Ultrasonic Guidance for needle placement?: Yes    Images are saved and documented.  Approach:  Lateral  Location:  Knee  Site:  L knee  Medications:  16 mg hyaluronate 16 mg/2 mL  Patient tolerance:  Patient tolerated the procedure well with no immediate complications     Ultrasound guidance was used for needle localization. Images were saved and stored for documentation. The appropriate structures were visualized. Dynamic visualization of the needle was continuous throughout the procedures and maintained good position.     We discussed the proper protocols after the injection such as no submerging pools, baths tubs, or hot tubs for 24 hr.  Showering is okay today.  We also discussed that blood sugars can be elevated after an injection and asked patient to properly check their sugars over the next few days and contact their PCP if there are any concerns.  We discussed red flags such as fevers, chills, red, warm, tender joint at the area of injection to please seek medical care immediately.

## 2024-04-25 ENCOUNTER — OFFICE VISIT (OUTPATIENT)
Dept: SPORTS MEDICINE | Facility: CLINIC | Age: 49
End: 2024-04-25
Payer: OTHER MISCELLANEOUS

## 2024-04-25 ENCOUNTER — PATIENT MESSAGE (OUTPATIENT)
Dept: SPORTS MEDICINE | Facility: CLINIC | Age: 49
End: 2024-04-25
Payer: COMMERCIAL

## 2024-04-25 DIAGNOSIS — G89.29 CHRONIC PAIN OF LEFT KNEE: ICD-10-CM

## 2024-04-25 DIAGNOSIS — M17.12 PRIMARY OSTEOARTHRITIS OF LEFT KNEE: Primary | ICD-10-CM

## 2024-04-25 DIAGNOSIS — M25.562 CHRONIC PAIN OF LEFT KNEE: ICD-10-CM

## 2024-04-25 PROCEDURE — 99999 PR PBB SHADOW E&M-EST. PATIENT-LVL II: CPT | Mod: PBBFAC,,, | Performed by: STUDENT IN AN ORGANIZED HEALTH CARE EDUCATION/TRAINING PROGRAM

## 2024-04-25 PROCEDURE — 20610 DRAIN/INJ JOINT/BURSA W/O US: CPT | Mod: LT,S$GLB,, | Performed by: STUDENT IN AN ORGANIZED HEALTH CARE EDUCATION/TRAINING PROGRAM

## 2024-04-25 PROCEDURE — 99499 UNLISTED E&M SERVICE: CPT | Mod: S$GLB,,, | Performed by: STUDENT IN AN ORGANIZED HEALTH CARE EDUCATION/TRAINING PROGRAM

## 2024-04-25 NOTE — PROCEDURES
Large Joint Aspiration/Injection    Date/Time: 4/25/2024 11:20 AM    Performed by: Jamaal Tijerina MD  Authorized by: Jamaal Tijerina MD    Consent Done?:  Yes (Verbal)  Indications:  Pain and joint swelling  Site marked: the procedure site was marked    Timeout: prior to procedure the correct patient, procedure, and site was verified      Local anesthesia used?: Yes    Local anesthetic:  Topical anesthetic  Ultrasonic Guidance for needle placement?: Yes    Images are saved and documented.  Approach:  Lateral  Location:  Knee  Medications:  16 mg hyaluronate 16 mg/2 mL  Patient tolerance:  Patient tolerated the procedure well with no immediate complications     Ultrasound guidance was used for needle localization. Images were saved and stored for documentation. The appropriate structures were visualized. Dynamic visualization of the needle was continuous throughout the procedures and maintained good position.     We discussed the proper protocols after the injection such as no submerging pools, baths tubs, or hot tubs for 24 hr.  Showering is okay today.  We also discussed that blood sugars can be elevated after an injection and asked patient to properly check their sugars over the next few days and contact their PCP if there are any concerns.  We discussed red flags such as fevers, chills, red, warm, tender joint at the area of injection to please seek medical care immediately.

## 2024-04-25 NOTE — PATIENT INSTRUCTIONS
Assessment:  Pio Navarro is a 48 y.o. male with a chief complaint of No chief complaint on file.    Encounter Diagnoses   Name Primary?    Primary osteoarthritis of left knee Yes    Chronic pain of left knee       Plan:  Left knee Synvisc injection today, #3/3.    Proper protocols after the aspiration included: no submerging pools, baths tubs, or hot tubs for 24 hr.  Showering is okay today.  Red flag symptoms include fever, chills, nausea, vomiting, red, warm, tender joint at the area of injection.  If you are noticing these symptoms, they may be indicative of an infection, and please seek medical care immediately, either by calling our clinic or going to the emergency room.      Follow-up:  2 months or sooner if there are any problems between now and then.    Thank you for choosing [facility] and Dr. Jamaal Tijerina for your orthopedic & sports medicine care. It is our goal to provide you with exceptional care that will help keep you healthy, active, and get you back in the game.    Please do not hesitate to reach out to us via email, phone, or MyChart with any questions, concerns, or feedback.    If you are experiencing pain/discomfort ,or have questions after 5pm and would like to be connected to the [facility] on-call team, please call this number and specify which Sports Medicine provider is treating you: [phone]

## 2024-06-19 NOTE — PROGRESS NOTES
Patient ID: Pio Navarro  YOB: 1975  MRN: [id number]    Chief Complaint: Pain of the Left Knee    Referred By: Og Roberts MD    Occupation:       History of Present Illness: Pio Navarro is a 48 y.o. male who presents today with Pain of the Left Knee    He was initially evaluated in our office in March 2023 after injuring his knee in December 2022 when his knee popped while rising from a squatted position.  MRI demonstrated meniscus and cartilage injury and wear.  He was initially treated with CSI, diclofenac and PT at [facility].  He was unable to attend PT.  When he failed to improve, he was given a home exercise program and subsequently received a left knee Durolane injection on 5/25/23.  He failed to improve and was referred to Dr. Og Roberts for a surgical consult.  He was initially treated with PT at [facility] and then underwent left knee arthroscopy, medial menisectomy, excision of plica, and chondroplasty of MFC & patella on 10/10/2023.  After surgery he attended PT for a short period of time before his insurance benefit was cut off.  Since then he has received a left knee CSI on 1/10/24 with Dr. Roberts and returned to our care for leukocyte poor PRP and simultaneous Synvisc injection, completed on 4/25/24.    Today, he reports that he had delayed, short term improvement that lasted for about 1 week.  He continues to have persistent medial and patellofemoral knee pain that worsens with weight bearing, kneeling, and direct pressure to the kneecap.  He has also noticed recent onset of burning sensations in the medial knee.      Past Medical History:   Past Medical History:   Diagnosis Date    Hypertension     Pre-diabetes      Past Surgical History:   Procedure Laterality Date    ARTHROSCOPIC CHONDROPLASTY OF KNEE JOINT Left 10/10/2023    Procedure: ARTHROSCOPY, KNEE, WITH CHONDROPLASTY;  Surgeon: Og Roberts MD;  Location: [facility];  Service: Orthopedics;  Laterality:  Left;    BACK SURGERY      HAND SURGERY      HERNIA REPAIR  11/2022    KNEE ARTHROSCOPY W/ MENISCECTOMY Left 10/10/2023    Procedure: ARTHROSCOPY, KNEE, WITH MENISCECTOMY;  Surgeon: Og Roberts MD;  Location: [facility] OR;  Service: Orthopedics;  Laterality: Left;    KNEE ARTHROSCOPY W/ PLICA EXCISION Left 10/10/2023    Procedure: EXCISION, PLICA, KNEE, ARTHROSCOPIC;  Surgeon: Og Roberts MD;  Location: [facility] OR;  Service: Orthopedics;  Laterality: Left;     Family History   Problem Relation Name Age of Onset    Cancer Mother      Cancer Father      No Known Problems Sister      No Known Problems Sister      Cancer Maternal Grandmother      Cancer Maternal Grandfather      Cancer Paternal Grandmother      Cancer Paternal Grandfather       Social History     Socioeconomic History    Marital status:    Tobacco Use    Smoking status: Unknown   Substance and Sexual Activity    Alcohol use: Yes     Comment: 12 beers a year    Drug use: Never    Sexual activity: Yes     Social Determinants of Health      Received from [facility]     Medication List with Changes/Refills   Current Medications    HYDROCODONE-ACETAMINOPHEN (NORCO) 5-325 MG PER TABLET    Take 1 tablet by mouth every 4 to 6 hours as needed for Pain.    METFORMIN (GLUCOPHAGE) 500 MG TABLET    Take 500 mg by mouth 2 (two) times daily with meals.    VALSARTAN-HYDROCHLOROTHIAZIDE (DIOVAN-HCT) 80-12.5 MG PER TABLET    Take by mouth.     Review of patient's allergies indicates:   Allergen Reactions    Egg Nausea And Vomiting       Physical Exam:   There is no height or weight on file to calculate BMI.    Physical Exam  Detailed MSK exam:     Left Knee:  Inspection: No effusion, erythema, or ecchymosis   Palpation tenderness: Medial joint line  Range of motion: 0 deg extension - 130 deg flexion  Strength:  5/5 Extension    5/5 Flexion  Stability: Stable ACL/Lachman      Stable Posterior Drawer      1+ with firm endpoint to Valgus  Stress      Stable Varus Stress  Patella Exam: Negative J-sign   Negative Patellar apprehension   Positive Patellar crepitus   N/V Exam:  Tibial:    Normal sensory (plantar foot)  Normal motor (FHL)    Sup Peroneal:   Normal sensory (dorsal foot)  Normal motor (Peroneals)            Deep Peroneal:   Normal sensory (1st web space)  Normal motor (EHL)    Sural:   Normal sensory (lateral foot)   Saphenous:   Normal sensory (medial lower leg)   Normal pedal pulses, warm and well perfused with capillary refill < 2 sec        Imaging:   XR Results:  Results for orders placed during the hospital encounter of 02/28/23    X-ray Knee Ortho Left with Flexion    Narrative  EXAM: XR KNEE ORTHO LEFT WITH FLEXION    CLINICAL INDICATION:   Pain in left knee    FINDINGS:  No comparison studies are available.  AP, oblique and sunrise views of both knees, as well as a lateral view of the left knee were submitted for interpretation.  Negative for left knee joint effusion.  There is a bone island within the left lateral and right medial femoral condyles.  Small left Fabella.    Alignment is satisfactory. No     fractures, dislocations, or erosive arthritic change.  Negative for radiopaque foreign bodies or air in the soft tissues.  Joint spaces are well-maintained.    Impression  1.  Negative for acute process involving the right or left knee.  2.  Incidental findings as noted above.    Finalized on: 2/28/2023 3:25 PM By:  Mauricio Munoz MD  [facility]# [id number]      2023-02-28 15:27:34.242    [facility]      MRI Results:  Results for orders placed during the hospital encounter of 03/08/23    MRI Knee Without Contrast Left    Narrative  EXAMINATION:  MRI KNEE WITHOUT CONTRAST LEFT    CLINICAL HISTORY:  Knee trauma, internal derangement suspected, xray done;Sprain of medial collateral ligament of left knee, initial encounter    TECHNIQUE:  Multiplanar, multisequence images were performed about the left knee.  No contrast was  administered.    COMPARISON:  Left knee x-ray, 02/28/2023    FINDINGS:  Horizontal cleavage tear of the body and posterior horn of the medial meniscus exiting the undersurface.  Lateral meniscus intact.    ACL, PCL, medial collateral ligament, lateral collateral complex, popliteus tendon, and extensor mechanism intact.    Full-thickness chondral loss in the median patellar ridge with underlying subchondral marrow edema.  Articular cartilage in the medial and lateral compartments is fairly well preserved.  Trace joint effusion.  Tiny Baker's cyst.    No fracture or avascular necrosis or acute osteochondral lesion.    Impression  1. Horizontal cleavage tear of the body and posterior horn medial meniscus.  2. Patellar chondromalacia with full-thickness chondral fissuring.  3. Trace joint effusion and tiny Baker cyst.      Electronically signed by: Jose Small MD  Date:    03/08/2023  Time:    09:24        Patient Instructions   Assessment:  Pio Navarro is a 48 y.o. male with a chief complaint of Pain of the Left Knee    Encounter Diagnoses   Name Primary?    Primary osteoarthritis of left knee Yes    Chronic pain of left knee     Injury of articular cartilage of left knee     Chondromalacia, left knee     S/P arthroscopic surgery of left knee     S/P arthroscopic partial medial meniscectomy of left knee       Plan:  Still with persistent left knee pain, only receiving approx. 2 weeks of relief following Synvisc injection series plus leukocyte poor PRP.  Has previously tried and failed multiple corticosteroid injections, physical therapy, home exercises, multiple viscosupplementation injections, PRP, and arthroscopic surgery with a partial meniscectomy and chondroplasty.    We will obtain new MRI, has been over a year since prior MRI, and has undergone surgery since then.  We will evaluate cartilage, to see if there has been interval progression of cartilage injury/degeneration.  Discussed that if we can not adequately  treat the pain with conservative measures, then knee replacement may be the only option.  Patient has already tried multiple conservative treatment options including PT, steroid injection, and gel injections. Patient would like to hold off on knee replacement surgery as much as possible. Given that the patient has already failed multiple conservative treatment options thus far, recommend iovera cryoneurolysis procedure to help provide significant relief for their chronic knee pain so they can get back to normal ADLs and other activities they enjoy doing which have been limited due to knee pain. Ultrasound evaluation was completed today and 5 sensory nerves were easily identified which would benefit from treatment with iovera.   Prior auth for iovera procedure.   We will measure for a medial  brace today.    Medial  brace required for valgus instability due to knee varus from medial compartment osteoarthritis.  May consider for long-acting corticosteroid injection with Zilretta, depending on results with Iovera.    Follow-up:  4 weeks for left knee Iovera procedure or sooner if there are any problems between now and then.    Thank you for choosing [facility] and Dr. Jamaal Tijerina for your orthopedic & sports medicine care. It is our goal to provide you with exceptional care that will help keep you healthy, active, and get you back in the game.    Please do not hesitate to reach out to us via email, phone, or MyChart with any questions, concerns, or feedback.    If you are experiencing pain/discomfort ,or have questions after 5pm and would like to be connected to the [facility] on-call team, please call this number and specify which Sports Medicine provider is treating you: [phone]       A copy of today's visit note has been sent to the referring provider.           Jamaal Tijerina MD  Primary Care Sports Medicine    Disclaimer: This  note was prepared using a voice recognition system and is likely to have sound alike errors within the text.

## 2024-06-20 ENCOUNTER — OFFICE VISIT (OUTPATIENT)
Dept: SPORTS MEDICINE | Facility: CLINIC | Age: 49
End: 2024-06-20
Payer: OTHER MISCELLANEOUS

## 2024-06-20 DIAGNOSIS — G89.29 CHRONIC PAIN OF LEFT KNEE: ICD-10-CM

## 2024-06-20 DIAGNOSIS — M17.12 PRIMARY OSTEOARTHRITIS OF LEFT KNEE: Primary | ICD-10-CM

## 2024-06-20 DIAGNOSIS — Z87.828 S/P ARTHROSCOPIC PARTIAL MEDIAL MENISCECTOMY OF LEFT KNEE: ICD-10-CM

## 2024-06-20 DIAGNOSIS — S89.82XA INJURY OF ARTICULAR CARTILAGE OF LEFT KNEE: ICD-10-CM

## 2024-06-20 DIAGNOSIS — Z98.890 S/P ARTHROSCOPIC SURGERY OF LEFT KNEE: ICD-10-CM

## 2024-06-20 DIAGNOSIS — M94.262 CHONDROMALACIA, LEFT KNEE: ICD-10-CM

## 2024-06-20 DIAGNOSIS — Z98.890 S/P ARTHROSCOPIC PARTIAL MEDIAL MENISCECTOMY OF LEFT KNEE: ICD-10-CM

## 2024-06-20 DIAGNOSIS — M25.562 CHRONIC PAIN OF LEFT KNEE: ICD-10-CM

## 2024-06-20 PROCEDURE — 99999 PR PBB SHADOW E&M-EST. PATIENT-LVL III: CPT | Mod: PBBFAC,,, | Performed by: STUDENT IN AN ORGANIZED HEALTH CARE EDUCATION/TRAINING PROGRAM

## 2024-06-20 NOTE — PATIENT INSTRUCTIONS
Assessment:  Pio Navarro is a 48 y.o. male with a chief complaint of Pain of the Left Knee    Encounter Diagnoses   Name Primary?    Primary osteoarthritis of left knee Yes    Chronic pain of left knee     Injury of articular cartilage of left knee     Chondromalacia, left knee     S/P arthroscopic surgery of left knee     S/P arthroscopic partial medial meniscectomy of left knee       Plan:  Still with persistent left knee pain, only receiving approx. 2 weeks of relief following Synvisc injection series plus leukocyte poor PRP.  Has previously tried and failed multiple corticosteroid injections, physical therapy, home exercises, multiple viscosupplementation injections, PRP, and arthroscopic surgery with a partial meniscectomy and chondroplasty.    We will obtain new MRI, has been over a year since prior MRI, and has undergone surgery since then.  We will evaluate cartilage, to see if there has been interval progression of cartilage injury/degeneration.  Discussed that if we can not adequately treat the pain with conservative measures, then knee replacement may be the only option.  Patient has already tried multiple conservative treatment options including PT, steroid injection, and gel injections. Patient would like to hold off on knee replacement surgery as much as possible. Given that the patient has already failed multiple conservative treatment options thus far, recommend iovera cryoneurolysis procedure to help provide significant relief for their chronic knee pain so they can get back to normal ADLs and other activities they enjoy doing which have been limited due to knee pain. Ultrasound evaluation was completed today and 5 sensory nerves were easily identified which would benefit from treatment with iovera.   Prior auth for iovera procedure.   We will measure for a medial  brace today.    Medial  brace required for valgus instability due to knee varus from medial compartment  osteoarthritis.  May consider for long-acting corticosteroid injection with Zilretta, depending on results with Iovera.    Follow-up:  4 weeks for left knee Iovera procedure or sooner if there are any problems between now and then.    Thank you for choosing [facility] and Dr. Jamaal Tijerina for your orthopedic & sports medicine care. It is our goal to provide you with exceptional care that will help keep you healthy, active, and get you back in the game.    Please do not hesitate to reach out to us via email, phone, or MyChart with any questions, concerns, or feedback.    If you are experiencing pain/discomfort ,or have questions after 5pm and would like to be connected to the [facility] on-call team, please call this number and specify which Sports Medicine provider is treating you: [phone]

## 2024-07-17 ENCOUNTER — TELEPHONE (OUTPATIENT)
Dept: SPORTS MEDICINE | Facility: CLINIC | Age: 49
End: 2024-07-17
Payer: OTHER MISCELLANEOUS

## 2024-07-17 NOTE — TELEPHONE ENCOUNTER
Called pt regarding auth pending status for his procedure. Pt agreed to reschedule to later date to allow more time for authorization through his workers comp.  Assisted with r/s appointment.  Pt verbalized understanding of new appointment date/time/location

## 2024-07-29 ENCOUNTER — PROCEDURE VISIT (OUTPATIENT)
Dept: SPORTS MEDICINE | Facility: CLINIC | Age: 49
End: 2024-07-29
Payer: OTHER MISCELLANEOUS

## 2024-07-29 DIAGNOSIS — M25.562 CHRONIC PAIN OF LEFT KNEE: ICD-10-CM

## 2024-07-29 DIAGNOSIS — G89.29 CHRONIC PAIN OF LEFT KNEE: ICD-10-CM

## 2024-07-29 DIAGNOSIS — S89.82XA INJURY OF ARTICULAR CARTILAGE OF LEFT KNEE: ICD-10-CM

## 2024-07-29 DIAGNOSIS — M17.12 PRIMARY OSTEOARTHRITIS OF LEFT KNEE: ICD-10-CM

## 2024-07-29 PROCEDURE — 76942 ECHO GUIDE FOR BIOPSY: CPT | Mod: S$GLB,,, | Performed by: STUDENT IN AN ORGANIZED HEALTH CARE EDUCATION/TRAINING PROGRAM

## 2024-07-29 PROCEDURE — 99499 UNLISTED E&M SERVICE: CPT | Mod: S$GLB,,, | Performed by: STUDENT IN AN ORGANIZED HEALTH CARE EDUCATION/TRAINING PROGRAM

## 2024-07-29 PROCEDURE — 64640 INJECTION TREATMENT OF NERVE: CPT | Mod: LT,S$GLB,, | Performed by: STUDENT IN AN ORGANIZED HEALTH CARE EDUCATION/TRAINING PROGRAM

## 2024-07-29 NOTE — PATIENT INSTRUCTIONS
Assessment:  Pio Navarro is a 48 y.o. male with a chief complaint of No chief complaint on file.    Encounter Diagnoses   Name Primary?    Primary osteoarthritis of left knee     Chronic pain of left knee     Injury of articular cartilage of left knee       Plan:  Left knee Iovera procedure today.  You had the Iovera procedure done to your knee today.  There may be bruising over the procedure area for the next few days.  Avoid soaking in standing water (ie bath or hot tub) for the next 24hrs.  Bandages can be removed this evening.    Follow-up: 4 weeks or sooner if there are any problems between now and then.    Thank you for choosing [facility] and Dr. Jamaal Tijerina for your orthopedic & sports medicine care. It is our goal to provide you with exceptional care that will help keep you healthy, active, and get you back in the game.    Please do not hesitate to reach out to us via email, phone, or MyChart with any questions, concerns, or feedback.    If you are experiencing pain/discomfort ,or have questions after 5pm and would like to be connected to the [facility] on-call team, please call this number and specify which Sports Medicine provider is treating you: [phone]

## 2024-07-29 NOTE — PROCEDURES
CC: left knee pain    48 y.o. Male presents today for Iovera procedure for left knee pain.    INFORMED CONSENT: I verify that I personally obtained Pio Navarro's consent which was signed and uploaded into Epic.     Inspection/Palpation:   +Skin warm and dry without open wounds or erythema  -Rubor   -Calor    Procedure Note Iovera:    DATE OF PROCEDURE:  07/29/2024    PREOPERATIVE DIAGNOSIS: left knee pain.     POSTOPERATIVE DIAGNOSIS: left knee pain.     PROCEDURE: Iovera treatment of anterior femoral cutaneous nerve, lateral femoral cutaneous nerve, medial femoral cutaneous nerve, and superior and inferior branches of infrapatellar saphenous nerve using 5 different punctures to treat all 5 nerves. (cpt 64640 x5)    COMPLICATIONS: None.     IMPLANTS:  None    ESTIMATED BLOOD LOSS:  < 5cc    SPECIMENS REMOVED:  None    ANESTHESIA: 15cc Local lidocaine without epinephrine    INDICATIONS FOR PROCEDURE: This is a 48 y.o. male with longstanding knee pain. They have failed non operative management including injections. I discussed a new treatment therapy called Iovera, which is cryotherapy, to provide symptomatic relief along the sensory distribution of the infrapatellar tendon branch of the saphenous nerve  and AFCN. The patient elected to move forward with this  We did discuss the fact that this is a fairly novel procedure and there is very limited scientific data around this.  However, it is FDA approved.  The patient was given patient information and literature to review prior to the procedure as well.  Based on this, the patient agreed to move forward with doing the procedure.    Time was spent discussing the cryoanalgesia procedure Iovera with the patient.  Risks and benefits were also discussed with patient.  X-rays were reviewed to use as a diagram to explain procedure. A detailed description of landmarks and placement of anesthetic used during procedure were also explained to patient.  Contraindications for  procedure were discussed with patient.    CONSENT:  Verbal and written consent were obtained from the patient.     PROCEDURE:    A surgical time out was performed, including verification of patient ID, procedure to be performed, site and side, availability of information and equipment, review of safety issues, and the patients agreement and consent.  The patient was placed supine on the exam table and the proximal medial aspect of the left tibia and anterior aspect of distal femur was prepped with sterile alcohol. Ultrasound was used to visualize and map out the targeted nerves. We then infiltrated the skin with lidocaine without epinephrine using a 25g needle. We then hydrodissected the nerve with 1cc lidocaine without epinephrine using a 20g spinal needle under ultrasound guidance. We then introduced the Iovera device using ultrasound guidance and this device penetrated the skin, creating cryotherapy to the superior and inferior branches of the infrapatellar saphenous nerve, a third treatment to the medial femoral cutaneous nerve, a fourth treatment to the anterior femoral cutaneous nerve, and fifth treatment to the lateral femoral cutaneous nerve. 5 punctures of the skin were made to treat the superior and inferior branches of the ISN, the MFCN, the AFCN and LFCN. There were a total of 5 nerves treated with iovera. The patient tolerated the procedure well with no problems.    PAIN SCORES:  Pre-procedure:  3/10  Post-procedure:  1/10      ASSESSMENT:      ICD-10-CM ICD-9-CM   1. Primary osteoarthritis of left knee  M17.12 715.16   2. Chronic pain of left knee  M25.562 719.46    G89.29 338.29   3. Injury of articular cartilage of left knee  S89.82XA 959.7         PLAN:  Post-procedure instructions given.   Follow up in 2 months.   All questions were answered to the best of my ability and all concerns were addressed at this time.

## 2024-07-31 ENCOUNTER — HOSPITAL ENCOUNTER (OUTPATIENT)
Dept: RADIOLOGY | Facility: HOSPITAL | Age: 49
Discharge: HOME | End: 2024-07-31
Attending: STUDENT IN AN ORGANIZED HEALTH CARE EDUCATION/TRAINING PROGRAM
Payer: OTHER MISCELLANEOUS

## 2024-07-31 DIAGNOSIS — G89.29 CHRONIC PAIN OF LEFT KNEE: ICD-10-CM

## 2024-07-31 DIAGNOSIS — M17.12 PRIMARY OSTEOARTHRITIS OF LEFT KNEE: ICD-10-CM

## 2024-07-31 DIAGNOSIS — M25.562 CHRONIC PAIN OF LEFT KNEE: ICD-10-CM

## 2024-07-31 DIAGNOSIS — S89.82XA INJURY OF ARTICULAR CARTILAGE OF LEFT KNEE: ICD-10-CM

## 2024-07-31 PROCEDURE — 73721 MRI JNT OF LWR EXTRE W/O DYE: CPT | Mod: TC,LT

## 2024-08-29 ENCOUNTER — OFFICE VISIT (OUTPATIENT)
Dept: SPORTS MEDICINE | Facility: CLINIC | Age: 49
End: 2024-08-29
Payer: OTHER MISCELLANEOUS

## 2024-08-29 VITALS — RESPIRATION RATE: 17 BRPM | WEIGHT: 315 LBS | BODY MASS INDEX: 45.1 KG/M2 | HEIGHT: 70 IN

## 2024-08-29 DIAGNOSIS — M94.262 CHONDROMALACIA, LEFT KNEE: ICD-10-CM

## 2024-08-29 DIAGNOSIS — Z98.890 S/P ARTHROSCOPIC SURGERY OF LEFT KNEE: ICD-10-CM

## 2024-08-29 DIAGNOSIS — S89.82XA INJURY OF ARTICULAR CARTILAGE OF LEFT KNEE: ICD-10-CM

## 2024-08-29 DIAGNOSIS — G89.29 CHRONIC PAIN OF LEFT KNEE: ICD-10-CM

## 2024-08-29 DIAGNOSIS — M25.562 CHRONIC PAIN OF LEFT KNEE: ICD-10-CM

## 2024-08-29 DIAGNOSIS — M17.12 PRIMARY OSTEOARTHRITIS OF LEFT KNEE: Primary | ICD-10-CM

## 2024-08-29 PROCEDURE — 99999 PR PBB SHADOW E&M-EST. PATIENT-LVL III: CPT | Mod: PBBFAC,,, | Performed by: STUDENT IN AN ORGANIZED HEALTH CARE EDUCATION/TRAINING PROGRAM

## 2024-08-29 RX ORDER — GABAPENTIN 300 MG/1
300 CAPSULE ORAL 3 TIMES DAILY
Qty: 90 CAPSULE | Refills: 1 | Status: SHIPPED | OUTPATIENT
Start: 2024-08-29

## 2024-08-29 NOTE — PROGRESS NOTES
Patient ID: Pio Navarro  YOB: 1975  MRN: [id number]    Chief Complaint: Pain of the Left Knee    Referred By: Og Roberts MD    Occupation:       History of Present Illness: Pio Navarro is a 49 y.o. male who presents today with Pain of the Left Knee    Mr. Navarro is an established patient in our practice since March 2023, treated conservatively for meniscus and cartilage disease with CSI, diclofenac, HEP, and Durolane.  He failed conservative treatment and underwent left knee arthroscopy, medial menisectomy, excision of plica, and chondroplasty of MFC & patella on 10/10/2023.  After surgery he attended PT for a short period of time before his insurance benefit was cut off.  Since then he has received a left knee CSI on 1/10/24 with Dr. Roberts and returned to our care for leukocyte poor PRP and simultaneous Synvisc injection on 4/25/24. He had minimal improvement.  He recently underwent left knee Iovera cryoneurolysis on 7/29/24.  He also recently completed an updated left knee MRI.    Today, he reports that he feels 80% improved following the Iovera procedure.  He still has pain along the medial joint line that worsens with bending and squatting at work.  He has also developed some hyperesthesia along the suprapatellar region.    Past Medical History:   Past Medical History:   Diagnosis Date    Hypertension     Pre-diabetes      Past Surgical History:   Procedure Laterality Date    ARTHROSCOPIC CHONDROPLASTY OF KNEE JOINT Left 10/10/2023    Procedure: ARTHROSCOPY, KNEE, WITH CHONDROPLASTY;  Surgeon: Og Roberts MD;  Location: [facility] OR;  Service: Orthopedics;  Laterality: Left;    BACK SURGERY      HAND SURGERY      HERNIA REPAIR  11/2022    KNEE ARTHROSCOPY W/ MENISCECTOMY Left 10/10/2023    Procedure: ARTHROSCOPY, KNEE, WITH MENISCECTOMY;  Surgeon: Og Roberts MD;  Location: [facility] OR;  Service: Orthopedics;  Laterality: Left;    KNEE ARTHROSCOPY W/ PLICA EXCISION Left  10/10/2023    Procedure: EXCISION, PLICA, KNEE, ARTHROSCOPIC;  Surgeon: Og Roberts MD;  Location: [facility];  Service: Orthopedics;  Laterality: Left;     Family History   Problem Relation Name Age of Onset    Cancer Mother      Cancer Father      No Known Problems Sister      No Known Problems Sister      Cancer Maternal Grandmother      Cancer Maternal Grandfather      Cancer Paternal Grandmother      Cancer Paternal Grandfather       Social History     Socioeconomic History    Marital status:    Tobacco Use    Smoking status: Unknown   Substance and Sexual Activity    Alcohol use: Yes     Comment: 12 beers a year    Drug use: Never    Sexual activity: Yes     Social Determinants of Health     Financial Resource Strain: Low Risk  (7/28/2024)    Overall Financial Resource Strain (CARDIA)     Difficulty of Paying Living Expenses: Not hard at all   Food Insecurity: No Food Insecurity (7/28/2024)    Hunger Vital Sign     Worried About Running Out of Food in the Last Year: Never true     Ran Out of Food in the Last Year: Never true   Physical Activity: Patient Declined (7/28/2024)    Exercise Vital Sign     Days of Exercise per Week: Patient declined     Minutes of Exercise per Session: Patient declined   Stress: No Stress Concern Present (7/28/2024)    [employer] - Occupational Stress Questionnaire     Feeling of Stress : Not at all   Housing Stability: Unknown (7/28/2024)    Housing Stability Vital Sign     Unable to Pay for Housing in the Last Year: No     Medication List with Changes/Refills   New Medications    GABAPENTIN (NEURONTIN) 300 MG CAPSULE    Take 1 capsule (300 mg total) by mouth 3 (three) times daily.   Current Medications    HYDROCODONE-ACETAMINOPHEN (NORCO) 5-325 MG PER TABLET    Take 1 tablet by mouth every 4 to 6 hours as needed for Pain.    METFORMIN (GLUCOPHAGE) 500 MG TABLET    Take 500 mg by mouth 2 (two) times daily with meals.     VALSARTAN-HYDROCHLOROTHIAZIDE (DIOVAN-HCT) 80-12.5 MG PER TABLET    Take by mouth.     Review of patient's allergies indicates:   Allergen Reactions    Egg Nausea And Vomiting       Physical Exam:   Body mass index is 48.65 kg/m².    Physical Exam  Detailed MSK exam:     Left Knee:  Inspection:  No effusion, erythema, or ecchymosis   Palpation tenderness: Medial joint line  Range of motion:  0 deg extension - 130 deg flexion  Strength:  5/5 Extension    5/5 Flexion  Stability: Stable ACL/Lachman      Stable Posterior Drawer      1+ with firm endpoint to Valgus Stress      Stable Varus Stress  Patella Exam: Negative J-sign   Negative Patellar apprehension   Positive Patellar crepitus   N/V Exam:  Tibial:    Normal sensory (plantar foot)  Normal motor (FHL)    Sup Peroneal:  Normal sensory (dorsal foot)  Normal motor (Peroneals)            Deep Peroneal:  Normal sensory (1st web space) Normal motor (EHL)    Sural:   Normal sensory (lateral foot)   Saphenous:   Normal sensory (medial lower leg)   Normal pedal pulses, warm and well perfused with capillary refill < 2 sec        Imaging:    MRI Knee Without Contrast Left  Narrative: EXAM:  MRI KNEE WITHOUT CONTRAST LEFT    CLINICAL INDICATION: Pain in left knee.  Unilateral primary osteoarthritis, left knee.    TECHNIQUE: MR left knee performed with multiplanar multisequence imaging.    COMPARISON STUDY:   03/08/2023.    FINDINGS: Again, there is moderate degenerative medial meniscal signal with new oblique longitudinal tear peripheral meniscal body and posterior horn involving inferior articular surface and capsular margin body, extending into the superior articular surface posterior horn.  There is no displaced meniscal fragment.    Lateral meniscus, collateral ligaments, and cruciate ligaments maintain normal signal intensity and morphology.  There is mild intermediate signal soft tissue thickening overlying the distal ACL footprint with small subcortical cruciate  insertional cysts.    Extensor mechanism is intact.  There is minimal lateral patellar subluxation, 5 mm, without tilting.  Normal TT TG distance, 11 mm.  There is a small amount knee joint fluid noted..  Trace Evans's bursal fluid.    Again, there is patellar chondromalacia with broad zone full-thickness chondral loss median ridge/medial facet with minimal subarticular edema like signal.  There is also focal chondromalacia posterior central weightbearing medial femoral condyle with chondral irregularity, shallow erosions and minimal subarticular edema like signal.  Lateral compartment chondral surfaces are well preserved.    There are a few small bone islands noted.  Impression: 1.  Moderate degenerative medial meniscal signal with new oblique longitudinal tear peripheral body and posterior horn, inferior articular surface and capsular margin body, superior articular surface posterior horn.  2.  Patellar chondromalacia, broad zone full-thickness loss loss median ridge/medial facet, minimal subarticular edema like signal.  3.  Focal chondromalacia posterior central weightbearing medial femoral condyle, surface irregularity/shallow erosions.    Finalized on: 7/31/2024 8:44 AM By:  George Parikh MD  [facility]# [id number]      2024-07-31 08:46:[id number]    [facility]    Relevant imaging results were reviewed and interpreted by me and per my read:  Stable degenerative changes of the left knee, chondral injury at the medial patellar facet and medial femoral condyle.  New signal within the meniscus, likely postsurgical following partial meniscectomy.  No acute abnormalities.    This was discussed with the patient and / or family today.      Patient Instructions   Assessment:  Pio Navarro is a 49 y.o. male with a chief complaint of Pain of the Left Knee    Encounter Diagnoses   Name Primary?    Primary osteoarthritis of left knee Yes    Chronic pain of left knee     Injury of articular cartilage of left knee     S/P arthroscopic surgery of  left knee     Chondromalacia, left knee       Plan:  Patient with chronic left knee pain, underlying osteoarthritis, chronic chondral injury, s/p meniscectomy.  Now 4 weeks out from Iovera procedure to the superficial nerves of the left knee, with 75-80% pain relief at this time.  We will continue monitor relief with continue time after procedure.  He is getting some hyperesthesia about the left leg, so we will start gabapentin 300 mg, 2-3 times per day.  If pain starts recurring in the left knee, or does not improve better, then we can consider repeating Iovera procedure targeting the deep superolateral, superomedial, and inferomedial genicular nerves.    Follow-up:  6 weeks or sooner if there are any problems between now and then.    Thank you for choosing [facility] and Dr. Jamaal Tijerina for your orthopedic & sports medicine care. It is our goal to provide you with exceptional care that will help keep you healthy, active, and get you back in the game.    Please do not hesitate to reach out to us via email, phone, or MyChart with any questions, concerns, or feedback.    If you are experiencing pain/discomfort ,or have questions after 5pm and would like to be connected to the [facility] on-call team, please call this number and specify which Sports Medicine provider is treating you: [phone]       A copy of today's visit note has been sent to the referring provider.           Jamaal Tijerina MD  Primary Care Sports Medicine    Disclaimer: This note was prepared using a voice recognition system and is likely to have sound alike errors within the text.

## 2024-08-29 NOTE — PATIENT INSTRUCTIONS
Assessment:  Pio Navarro is a 49 y.o. male with a chief complaint of Pain of the Left Knee    Encounter Diagnoses   Name Primary?    Primary osteoarthritis of left knee Yes    Chronic pain of left knee     Injury of articular cartilage of left knee     S/P arthroscopic surgery of left knee     Chondromalacia, left knee       Plan:  Patient with chronic left knee pain, underlying osteoarthritis, chronic chondral injury, s/p meniscectomy.  Now 4 weeks out from Iovera procedure to the superficial nerves of the left knee, with 75-80% pain relief at this time.  We will continue monitor relief with continue time after procedure.  He is getting some hyperesthesia about the left leg, so we will start gabapentin 300 mg, 2-3 times per day.  If pain starts recurring in the left knee, or does not improve better, then we can consider repeating Iovera procedure targeting the deep superolateral, superomedial, and inferomedial genicular nerves.    Follow-up:  6 weeks or sooner if there are any problems between now and then.    Thank you for choosing [facility] and Dr. Jamaal Tijerina for your orthopedic & sports medicine care. It is our goal to provide you with exceptional care that will help keep you healthy, active, and get you back in the game.    Please do not hesitate to reach out to us via email, phone, or MyChart with any questions, concerns, or feedback.    If you are experiencing pain/discomfort ,or have questions after 5pm and would like to be connected to the [facility] on-call team, please call this number and specify which Sports Medicine provider is treating you: [phone]

## 2024-10-24 ENCOUNTER — OFFICE VISIT (OUTPATIENT)
Dept: SPORTS MEDICINE | Facility: CLINIC | Age: 49
End: 2024-10-24
Payer: OTHER MISCELLANEOUS

## 2024-10-24 VITALS — BODY MASS INDEX: 38.45 KG/M2 | WEIGHT: 268 LBS

## 2024-10-24 DIAGNOSIS — M17.12 PRIMARY OSTEOARTHRITIS OF LEFT KNEE: Primary | ICD-10-CM

## 2024-10-24 DIAGNOSIS — S89.82XA INJURY OF ARTICULAR CARTILAGE OF LEFT KNEE: ICD-10-CM

## 2024-10-24 DIAGNOSIS — G89.29 CHRONIC PAIN OF LEFT KNEE: ICD-10-CM

## 2024-10-24 DIAGNOSIS — Z98.890 S/P ARTHROSCOPIC SURGERY OF LEFT KNEE: ICD-10-CM

## 2024-10-24 DIAGNOSIS — M25.562 CHRONIC PAIN OF LEFT KNEE: ICD-10-CM

## 2024-10-24 NOTE — PATIENT INSTRUCTIONS
Assessment:  Pio Navarro is a 49 y.o. male with a chief complaint of Pain of the Left Knee    Encounter Diagnoses   Name Primary?    Primary osteoarthritis of left knee Yes    Chronic pain of left knee     Injury of articular cartilage of left knee     S/P arthroscopic surgery of left knee       Plan:  Patient with chronic left knee pain, underlying osteoarthritis, exacerbated by injury at work in December of 2022.  Treatments to date:  03/09/2023 - left knee corticosteroid injection 03/09/2023 05/25/2023 - left knee Durolane injection 05/25/2023  10/10/2023 - left knee arthroscopy with partial medial meniscectomy, chondroplasty of the medial femoral condyle and the patella, and medial plica debridement.    01/10/2024 - left knee corticosteroid injection  04/09/2024 - left knee LP-PRP + Synvisc injection #1/3  04/18/2024 - left knee Synvisc injection #2/3  04/25/2024 - left knee Synvisc injection #3/3  07/29/2024 - left knee cryoneurolysis with Iovera of the superficial genicular nerves  Home exercise program  Unfortunately, patient has pain is still persistent/recurrent.  General, underlying pain and discomfort has calmed down substantially, nearly 80%, following Iovera procedure.  However he is still having significant painful flares, with particular movements, twisting of the knee.  We will order for medial  brace.  Medial  brace required for valgus instability due to knee varus from medial compartment osteoarthritis.  Given persistence of the pain, the limitations and debility it is causing, patient is interested in possible total knee replacement.  We will refer to our joint surgeon, Dr. Martínez for evaluation    Follow-up: with Dr Martínez or sooner if there are any problems between now and then.    Thank you for choosing [facility] and Dr. Jamaal Tijerina for your orthopedic & sports medicine care. It is our goal to provide you with exceptional care that will help keep you  healthy, active, and get you back in the game.    Please do not hesitate to reach out to us via email, phone, or MyChart with any questions, concerns, or feedback.    If you are experiencing pain/discomfort ,or have questions after 5pm and would like to be connected to the [facility] on-call team, please call this number and specify which Sports Medicine provider is treating you: [phone]

## 2024-10-24 NOTE — PROGRESS NOTES
Patient ID: Pio Navarro  YOB: 1975  MRN: [id number]    Chief Complaint: Pain of the Left Knee    Referred By: Og Roberts MD    Occupation:       History of Present Illness: Pio Navarro is a 49 y.o. male who presents today with Pain of the Left Knee    Mr. Navarro is an established patient in our practice since March 2023, treated conservatively for meniscus and cartilage disease with CSI, diclofenac, HEP, and Durolane.  He failed conservative treatment and underwent left knee arthroscopy, medial menisectomy, excision of plica, and chondroplasty of MFC & patella on 10/10/2023.  After surgery he attended PT for a short period of time before his insurance benefit was cut off.  Since then he has received a left knee CSI on 1/10/24 with Dr. Roberts and returned to our care for leukocyte poor PRP and simultaneous Synvisc injection on 4/25/24. He had minimal improvement.  He recently underwent left knee Iovera cryoneurolysis on 7/29/24.  An updated MRI demonstrated cartilage and meniscus degenerative pathology and postsurgical changes.  At his last visit on 8/29/24, he reported 80% pain relief.  He was prescribed gabapentin to treat hyperesthesia of the left leg.     Today, he reports that his knee is feeling better.  He is no longer having hyperesthesia symptoms and has discontinued the gabapentin.  He still has some numbness in the knee.  He is considering pursuing TKA.      Treatment to date:  03/09/2023 - left knee corticosteroid injection 03/09/2023 05/25/2023 - left knee Durolane injection 05/25/2023  10/10/2023 - left knee arthroscopy with partial medial meniscectomy, chondroplasty of the medial femoral condyle and the patella, and medial plica debridement.    01/10/2024 - left knee corticosteroid injection  04/09/2024 - left knee LP-PRP + Synvisc injection #1/3  04/18/2024 - left knee Synvisc injection #2/3  04/25/2024 - left knee Synvisc injection #3/3  07/29/2024 - left knee  cryoneurolysis with Iovera of the superficial genicular nerves    Past Medical History:   Past Medical History:   Diagnosis Date    Hypertension     Pre-diabetes      Past Surgical History:   Procedure Laterality Date    ARTHROSCOPIC CHONDROPLASTY OF KNEE JOINT Left 10/10/2023    Procedure: ARTHROSCOPY, KNEE, WITH CHONDROPLASTY;  Surgeon: Og Roberts MD;  Location: [facility];  Service: Orthopedics;  Laterality: Left;    BACK SURGERY      HAND SURGERY      HERNIA REPAIR  11/2022    KNEE ARTHROSCOPY W/ MENISCECTOMY Left 10/10/2023    Procedure: ARTHROSCOPY, KNEE, WITH MENISCECTOMY;  Surgeon: Og Roberts MD;  Location: [facility];  Service: Orthopedics;  Laterality: Left;    KNEE ARTHROSCOPY W/ PLICA EXCISION Left 10/10/2023    Procedure: EXCISION, PLICA, KNEE, ARTHROSCOPIC;  Surgeon: Og Roberts MD;  Location: [facility];  Service: Orthopedics;  Laterality: Left;     Family History   Problem Relation Name Age of Onset    Cancer Mother      Cancer Father      No Known Problems Sister      No Known Problems Sister      Cancer Maternal Grandmother      Cancer Maternal Grandfather      Cancer Paternal Grandmother      Cancer Paternal Grandfather       Social History     Socioeconomic History    Marital status:    Tobacco Use    Smoking status: Unknown   Substance and Sexual Activity    Alcohol use: Yes     Comment: 12 beers a year    Drug use: Never    Sexual activity: Yes     Social Drivers of Health     Financial Resource Strain: Low Risk  (7/28/2024)    Overall Financial Resource Strain (CARDIA)     Difficulty of Paying Living Expenses: Not hard at all   Food Insecurity: No Food Insecurity (7/28/2024)    Hunger Vital Sign     Worried About Running Out of Food in the Last Year: Never true     Ran Out of Food in the Last Year: Never true   Physical Activity: Patient Declined (7/28/2024)    Exercise Vital Sign     Days of Exercise per Week: Patient declined     Minutes of Exercise per Session:  Patient declined   Stress: No Stress Concern Present (7/28/2024)    [place] [employer] [place] [employer] - Occupational Stress Questionnaire     Feeling of Stress : Not at all   Housing Stability: Unknown (7/28/2024)    Housing Stability Vital Sign     Unable to Pay for Housing in the Last Year: No     Medication List with Changes/Refills   Current Medications    GABAPENTIN (NEURONTIN) 300 MG CAPSULE    Take 1 capsule (300 mg total) by mouth 3 (three) times daily.    HYDROCODONE-ACETAMINOPHEN (NORCO) 5-325 MG PER TABLET    Take 1 tablet by mouth every 4 to 6 hours as needed for Pain.    METFORMIN (GLUCOPHAGE) 500 MG TABLET    Take 500 mg by mouth 2 (two) times daily with meals.    VALSARTAN-HYDROCHLOROTHIAZIDE (DIOVAN-HCT) 80-12.5 MG PER TABLET    Take by mouth.     Review of patient's allergies indicates:   Allergen Reactions    Egg Nausea And Vomiting       Physical Exam:   Body mass index is 38.45 kg/m².    Detailed MSK exam:     Left Knee:  Inspection:  No effusion, erythema, or ecchymosis   Palpation tenderness: Medial and anteromedial joint line  Range of motion:  0 deg extension - 130 deg flexion  Strength:  5/5 Extension    5/5 Flexion  Stability: Stable ACL/Lachman      Stable Posterior Drawer      1+ with firm endpoint to Valgus Stress      Stable Varus Stress  Patella Exam: Negative J-sign   Negative Patellar apprehension   Positive Patellar crepitus   N/V Exam:  Tibial:    Normal sensory (plantar foot)  Normal motor (FHL)    Sup Peroneal:  Normal sensory (dorsal foot)  Normal motor (Peroneals)            Deep Peroneal:  Normal sensory (1st web space) Normal motor (EHL)    Sural:   Normal sensory (lateral foot)   Saphenous:   Normal sensory (medial lower leg)   Normal pedal pulses, warm and well perfused with capillary refill < 2 sec        Imaging:   XR Results:  Results for orders placed during the hospital encounter of 02/28/23    X-ray Knee Ortho Left with Flexion    Narrative  EXAM: XR KNEE ORTHO  LEFT WITH FLEXION    CLINICAL INDICATION:   Pain in left knee    FINDINGS:  No comparison studies are available.  AP, oblique and sunrise views of both knees, as well as a lateral view of the left knee were submitted for interpretation.  Negative for left knee joint effusion.  There is a bone island within the left lateral and right medial femoral condyles.  Small left Fabella.    Alignment is satisfactory. No     fractures, dislocations, or erosive arthritic change.  Negative for radiopaque foreign bodies or air in the soft tissues.  Joint spaces are well-maintained.    Impression  1.  Negative for acute process involving the right or left knee.  2.  Incidental findings as noted above.    Finalized on: 2/28/2023 3:25 PM By:  Mauricio Munoz MD  [facility]# [id number]      2023-02-28 15:27:34.242    [facility]      MRI Results:  Results for orders placed during the hospital encounter of 07/31/24    MRI Knee Without Contrast Left    Narrative  EXAM:  MRI KNEE WITHOUT CONTRAST LEFT    CLINICAL INDICATION: Pain in left knee.  Unilateral primary osteoarthritis, left knee.    TECHNIQUE: MR left knee performed with multiplanar multisequence imaging.    COMPARISON STUDY:   03/08/2023.    FINDINGS: Again, there is moderate degenerative medial meniscal signal with new oblique longitudinal tear peripheral meniscal body and posterior horn involving inferior articular surface and capsular margin body, extending into the superior articular surface posterior horn.  There is no displaced meniscal fragment.    Lateral meniscus, collateral ligaments, and cruciate ligaments maintain normal signal intensity and morphology.  There is mild intermediate signal soft tissue thickening overlying the distal ACL footprint with small subcortical cruciate insertional cysts.    Extensor mechanism is intact.  There is minimal lateral patellar subluxation, 5 mm, without tilting.  Normal TT TG distance, 11 mm.  There is a small amount knee joint fluid noted..  Trace  Baker's bursal fluid.    Again, there is patellar chondromalacia with broad zone full-thickness chondral loss median ridge/medial facet with minimal subarticular edema like signal.  There is also focal chondromalacia posterior central weightbearing medial femoral condyle with chondral irregularity, shallow erosions and minimal subarticular edema like signal.  Lateral compartment chondral surfaces are well preserved.    There are a few small bone islands noted.    Impression  1.  Moderate degenerative medial meniscal signal with new oblique longitudinal tear peripheral body and posterior horn, inferior articular surface and capsular margin body, superior articular surface posterior horn.  2.  Patellar chondromalacia, broad zone full-thickness loss loss median ridge/medial facet, minimal subarticular edema like signal.  3.  Focal chondromalacia posterior central weightbearing medial femoral condyle, surface irregularity/shallow erosions.    Finalized on: 7/31/2024 8:44 AM By:  George Parikh MD  [facility]# [id number]      2024-07-31 08:46:52.939    [facility]         Patient Instructions   Assessment:  Pio Navarro is a 49 y.o. male with a chief complaint of Pain of the Left Knee    Encounter Diagnoses   Name Primary?    Primary osteoarthritis of left knee Yes    Chronic pain of left knee     Injury of articular cartilage of left knee     S/P arthroscopic surgery of left knee       Plan:  Patient with chronic left knee pain, underlying osteoarthritis, exacerbated by injury at work in December of 2022.  Treatments to date:  03/09/2023 - left knee corticosteroid injection 03/09/2023 05/25/2023 - left knee Durolane injection 05/25/2023  10/10/2023 - left knee arthroscopy with partial medial meniscectomy, chondroplasty of the medial femoral condyle and the patella, and medial plica debridement.    01/10/2024 - left knee corticosteroid injection  04/09/2024 - left knee LP-PRP + Synvisc injection #1/3  04/18/2024 - left knee Synvisc injection  #2/3  04/25/2024 - left knee Synvisc injection #3/3  07/29/2024 - left knee cryoneurolysis with Iovera of the superficial genicular nerves  Home exercise program  Unfortunately, patient has pain is still persistent/recurrent.  General, underlying pain and discomfort has calmed down substantially, nearly 80%, following Iovera procedure.  However he is still having significant painful flares, with particular movements, twisting of the knee.  We will order for medial  brace.  Medial  brace required for valgus instability due to knee varus from medial compartment osteoarthritis.  Given persistence of the pain, the limitations and debility it is causing, patient is interested in possible total knee replacement.  We will refer to our joint surgeon, Dr. Martínez for evaluation    Follow-up: with Dr Martínez or sooner if there are any problems between now and then.    Thank you for choosing [facility] and Dr. Jamaal Tijerina for your orthopedic & sports medicine care. It is our goal to provide you with exceptional care that will help keep you healthy, active, and get you back in the game.    Please do not hesitate to reach out to us via email, phone, or MyChart with any questions, concerns, or feedback.    If you are experiencing pain/discomfort ,or have questions after 5pm and would like to be connected to the [facility] on-call team, please call this number and specify which Sports Medicine provider is treating you: [phone]       A copy of today's visit note has been sent to the referring provider.           Jamaal Tijerina MD  Primary Care Sports Medicine    Disclaimer: This note was prepared using a voice recognition system and is likely to have sound alike errors within the text.

## 2024-12-13 ENCOUNTER — OFFICE VISIT (OUTPATIENT)
Dept: INTERNAL MEDICINE | Facility: CLINIC | Age: 49
End: 2024-12-13
Payer: COMMERCIAL

## 2024-12-13 VITALS
TEMPERATURE: 97 F | HEIGHT: 70 IN | DIASTOLIC BLOOD PRESSURE: 82 MMHG | BODY MASS INDEX: 40.11 KG/M2 | OXYGEN SATURATION: 100 % | SYSTOLIC BLOOD PRESSURE: 130 MMHG | WEIGHT: 280.19 LBS | HEART RATE: 63 BPM

## 2024-12-13 DIAGNOSIS — R10.32 LLQ PAIN: Primary | ICD-10-CM

## 2024-12-13 DIAGNOSIS — R10.32 LEFT LOWER QUADRANT PAIN: ICD-10-CM

## 2024-12-13 DIAGNOSIS — M79.642 PAIN IN BOTH HANDS: ICD-10-CM

## 2024-12-13 DIAGNOSIS — M79.641 PAIN IN BOTH HANDS: ICD-10-CM

## 2024-12-13 PROCEDURE — 99999 PR PBB SHADOW E&M-EST. PATIENT-LVL III: CPT | Mod: PBBFAC,,, | Performed by: FAMILY MEDICINE

## 2024-12-13 RX ORDER — MELOXICAM 15 MG/1
15 TABLET ORAL DAILY
Qty: 30 TABLET | Refills: 2 | Status: SHIPPED | OUTPATIENT
Start: 2024-12-13

## 2024-12-13 NOTE — PROGRESS NOTES
Subjective:      Patient ID: Pio Navarro is a 49 y.o. male.    Chief Complaint: Abdominal Pain      History of Present Illness    CHIEF COMPLAINT:  Mr. Navarro presents today with abdominal pain and alternating diarrhea and constipation.    GASTROINTESTINAL:  He experiences constant pain in the lower back and left lower abdomen. The pain is localized to the left side of the distal GI tract . He reports alternating diarrhea and constipation for the past couple months. Constipation occurs after consuming solid foods, with minimal and intermittent bowel movements. He experienced diarrhea for approximately one week after taking his wife's laxative medication. A colonoscopy showed normal results except for removal of small polyps.    MEDICAL HISTORY:  He has had a back stimulator for 4-5 years.    SURGICAL HISTORY:  He underwent hernia surgery in 2022 and is concerned about possible new hernia or surgical mesh displacement. He also had meniscal debridement which revealed significant cartilage loss.    MUSCULOSKELETAL:  He reports hand pain, particularly in middle fingers, severe enough to cause dropping objects when grasping items. He takes Aleve for joint pain with some improvement after several days, though soreness persists. Following meniscal debridement, he reports deadened nerves around the knee without constant knee pain. He is pursuing consultation with knee surgeon.    LABS:  A1C is 5.5, improved from previous elevation. Cholesterol, kidney function, liver function, glucose, PSA, thyroid, urinalysis, hepatitis C, and testosterone levels are normal.    WEIGHT MANAGEMENT:  He is intentionally losing weight to improve his health, which has provided minimal improvement in knee pain.        Past Medical History:   Diagnosis Date    Hypertension     Pre-diabetes           Past Surgical History:   Procedure Laterality Date    ARTHROSCOPIC CHONDROPLASTY OF KNEE JOINT Left 10/10/2023    Procedure: ARTHROSCOPY, KNEE, WITH  CHONDROPLASTY;  Surgeon: Og Roberts MD;  Location: [facility];  Service: Orthopedics;  Laterality: Left;    BACK SURGERY      HAND SURGERY      HERNIA REPAIR  11/2022    KNEE ARTHROSCOPY W/ MENISCECTOMY Left 10/10/2023    Procedure: ARTHROSCOPY, KNEE, WITH MENISCECTOMY;  Surgeon: Og Roberts MD;  Location: [facility];  Service: Orthopedics;  Laterality: Left;    KNEE ARTHROSCOPY W/ PLICA EXCISION Left 10/10/2023    Procedure: EXCISION, PLICA, KNEE, ARTHROSCOPIC;  Surgeon: Og Roberts MD;  Location: [facility];  Service: Orthopedics;  Laterality: Left;     Family History   Problem Relation Name Age of Onset    Cancer Mother      Cancer Father      No Known Problems Sister      No Known Problems Sister      Cancer Maternal Grandmother      Cancer Maternal Grandfather      Cancer Paternal Grandmother      Cancer Paternal Grandfather       Social History     Socioeconomic History    Marital status:    Tobacco Use    Smoking status: Never    Smokeless tobacco: Never   Substance and Sexual Activity    Alcohol use: Yes     Comment: 12 beers a year    Drug use: Never    Sexual activity: Yes     Social Drivers of Health     Financial Resource Strain: Low Risk  (7/28/2024)    Overall Financial Resource Strain (CARDIA)     Difficulty of Paying Living Expenses: Not hard at all   Food Insecurity: No Food Insecurity (7/28/2024)    Hunger Vital Sign     Worried About Running Out of Food in the Last Year: Never true     Ran Out of Food in the Last Year: Never true   Physical Activity: Patient Declined (7/28/2024)    Exercise Vital Sign     Days of Exercise per Week: Patient declined     Minutes of Exercise per Session: Patient declined   Stress: No Stress Concern Present (7/28/2024)    [employer] [place] [employer] - Occupational Stress Questionnaire     Feeling of Stress : Not at all   Housing Stability: Unknown (7/28/2024)    Housing Stability Vital Sign     Unable to Pay for Housing in the Last  "Year: No     Review of patient's allergies indicates:   Allergen Reactions    Egg Nausea And Vomiting       Review of Systems   Constitutional:  Negative for fever and weight loss.   Gastrointestinal:  Positive for abdominal pain, constipation and diarrhea. Negative for melena, nausea and vomiting.   Genitourinary:  Negative for dysuria, frequency and hematuria.   Musculoskeletal:  Negative for myalgias.   Neurological:  Negative for headaches.     Objective:       /82 (BP Location: Left arm, Patient Position: Sitting)   Pulse 63   Temp 96.6 °F (35.9 °C) (Tympanic)   Ht 5' 10" (1.778 m)   Wt 127.1 kg (280 lb 3.3 oz)   SpO2 100%   BMI 40.21 kg/m²   Physical Exam    Abdomen: Tenderness in lower abdomen. No tenderness on right side of abdomen. No tenderness in upper abdomen.        Physical Exam  Constitutional:       General: He is not in acute distress.     Appearance: Normal appearance. He is well-developed. He is not ill-appearing or diaphoretic.   Cardiovascular:      Rate and Rhythm: Normal rate and regular rhythm.      Heart sounds: Normal heart sounds.   Pulmonary:      Effort: Pulmonary effort is normal.      Breath sounds: Normal breath sounds.   Abdominal:      Palpations: Abdomen is soft.      Tenderness: There is abdominal tenderness (LLQ). There is guarding (LLQ).   Musculoskeletal:      Lumbar back: Tenderness (lower lumbar spinal) present. No spasms.   Neurological:      General: No focal deficit present.      Mental Status: He is alert and oriented to person, place, and time.   Psychiatric:         Mood and Affect: Mood normal.         Behavior: Behavior normal.         Thought Content: Thought content normal.         Judgment: Judgment normal.         Assessment:     1. LLQ pain    2. Pain in both hands    3. Left lower quadrant pain      Plan:   Assessment & Plan    ABDOMINAL PAIN AND GASTROINTESTINAL ISSUES:  - Evaluated patient's abdominal pain, diarrhea, and constipation.  - Reviewed " recent labs: all within normal limits.  - Performed physical exam, noting tenderness in lower left abdomen.  - Considered differential diagnoses including diverticulitis, mesh repair complications, and adhesions from previous hernia surgery.  - Determined need for CT to further evaluate abdominal symptoms and rule out serious pathology.  - Explained location of pain corresponds to end of GI tract.  - Discussed potential causes of symptoms including diverticulitis, mesh complications, and adhesions.  - CT of abdomen ordered.    HERNIA:  - Considered differential diagnoses including diverticulitis, mesh repair complications, and adhesions from previous hernia surgery.  - Discussed potential causes of symptoms including diverticulitis, mesh complications, and adhesions.    JOINT PAIN AND OSTEOARTHRITIS:  - Assessed joint pain in hands, likely arthritic in nature.  - Will trial oral anti-inflammatory medication before considering referral to hand specialist.  - Informed patient about relationship between barometric pressure changes and arthritis flare-ups.  - Started meloxicam for joint pain.  - Take with food to prevent stomach upset.  - Take consistently for at least 1 week, then assess effectiveness.  - Can be taken as needed after initial trial period.    FOLLOW-UP:  - Follow up after CT results are available.  - Contact the office if symptoms worsen or new concerns arise.        LLQ pain    Pain in both hands    Left lower quadrant pain  -     CT Abdomen Pelvis  Without Contrast; Future; Expected date: 12/13/2024    Other orders  -     meloxicam (MOBIC) 15 MG tablet; Take 1 tablet (15 mg total) by mouth once daily. Take with meal.  Dispense: 30 tablet; Refill: 2      Medication List with Changes/Refills   New Medications    MELOXICAM (MOBIC) 15 MG TABLET    Take 1 tablet (15 mg total) by mouth once daily. Take with meal.   Current Medications    METFORMIN (GLUCOPHAGE) 500 MG TABLET    Take 500 mg by mouth 2 (two)  times daily with meals.    VALSARTAN-HYDROCHLOROTHIAZIDE (DIOVAN-HCT) 80-12.5 MG PER TABLET    Take 1 tablet by mouth as needed.       This note was generated with the assistance of ambient listening technology. Verbal consent was obtained by the patient and accompanying visitor(s) for the recording of patient appointment to facilitate this note. I attest to having reviewed and edited the generated note for accuracy, though some syntax or spelling errors may persist. Please contact the author of this note for any clarification.       Answers submitted by the patient for this visit:  Abdominal Pain Questionnaire (Submitted on 12/12/2024)  Chief Complaint: Abdominal pain  Chronicity: recurrent  Onset: more than 1 month ago  Onset quality: sudden  Frequency: constantly  Episode duration: 6 Days  Pain location: LLQ  Pain - numeric: 7/10  Pain quality: aching, dull, sharp  Radiates to: back  anorexia: No  arthralgias: Yes  belching: No  flatus: No  hematochezia: No  Aggravated by: certain positions  Relieved by: certain positions  Pain severity: moderate  Treatments tried: acetaminophen  Improvement on treatment: no relief  abdominal surgery: Yes  colon cancer: No  Crohn's disease: No  gallstones: No  GERD: No  irritable bowel syndrome: No  kidney stones: No  pancreatitis: No  PUD: No  ulcerative colitis: No  UTI: No

## 2024-12-26 NOTE — TELEPHONE ENCOUNTER
Contacted Dr. Shultz (cardiology) office to have them fax over cardiac notes for clearance prior to sx scheduled for tomorrow (10/10). Spoke with Ruth Ann who stated that [facility] is in [place] today but that she would have the nurse send over the requested documents. Informed Ruth Ann that sx is scheduled for tomorrow and we need notes or sx may be rescheduled. Gave her our phone number [phone] and fax [phone]. Notified DOMINIC [place]   Hypertension Chronic atrial fibrillation

## 2025-01-16 ENCOUNTER — OFFICE VISIT (OUTPATIENT)
Dept: ORTHOPEDICS | Facility: CLINIC | Age: 50
End: 2025-01-16
Payer: OTHER MISCELLANEOUS

## 2025-01-16 VITALS — HEIGHT: 70 IN | WEIGHT: 283.75 LBS | BODY MASS INDEX: 40.62 KG/M2

## 2025-01-16 DIAGNOSIS — M17.12 PRIMARY OSTEOARTHRITIS OF LEFT KNEE: ICD-10-CM

## 2025-01-16 DIAGNOSIS — G89.29 CHRONIC PAIN OF LEFT KNEE: ICD-10-CM

## 2025-01-16 DIAGNOSIS — S89.82XA INJURY OF ARTICULAR CARTILAGE OF LEFT KNEE: ICD-10-CM

## 2025-01-16 DIAGNOSIS — M25.562 CHRONIC PAIN OF LEFT KNEE: ICD-10-CM

## 2025-01-16 PROCEDURE — 99999 PR PBB SHADOW E&M-EST. PATIENT-LVL V: CPT | Mod: PBBFAC,,, | Performed by: ORTHOPAEDIC SURGERY

## 2025-01-16 PROCEDURE — 99215 OFFICE O/P EST HI 40 MIN: CPT | Mod: S$GLB,,, | Performed by: ORTHOPAEDIC SURGERY

## 2025-01-16 NOTE — PROGRESS NOTES
Name: Pio Navarro  MRN: [id number]  Date: 1/16/2025  Time: 11:39 AM    Diagnoses of Primary osteoarthritis of left knee, Chronic pain of left knee, and Injury of articular cartilage of left knee were pertinent to this visit.     SUBJECTIVE:  49-year-old with debilitating osteoarthritis of the left knee.  He was referred by Dr. Tijerina sports medicine at [facility] for consideration of knee replacement surgery.  He has had all reasonable conservative treatment methods leading up to decision for possible knee replacement surgery.  Injections including steroidal injections and Visco supplements failed to relieve his symptoms.  He has tried to brace the knee and gone through physical therapy with a no significant relief.  Iovra ablation was tried on July 20, 2024 without significant relief.  He had a knee arthroscopy performed by Dr. Roberts in October 20, 2023.  with findings of exposed bone on the medial side of the joint.  MRI performed at [facility] July of 2024 shows moderate degenerative medial meniscus.  There is patellar chondromalacia with broad full-thickness medial ridge loss.  Focal chondromalacia in the weight-bearing portion of the medial femoral condyle.    The patient states his pain is 8 to 9/10 on a daily basis with the walking and climbing and 7/10 with throbbing at night.    He works as a body works specialist.  His knee functioning pairs as ability to work well.  He states his quality of life is extremely poor.  He has not slept well in over a year due to throbbing knee pain.  He can not climb steps without difficulty has trouble kneeling and squatting on the left knee.    Negative for diabetes.  Nonsmoker.  No use of chronic pain meds.  No history of stroke or TIA.  No history of DVT or pulmonary embolus.  He did have history of the MRSA requiring surgical debridement after a bug bite several years ago.  He has implanted back stimulator.    Uses Mobic and no other aspirin or anti-inflammatories.  He is  on no blood thinner products.      Past Medical History:   Diagnosis Date    Hypertension     Pre-diabetes      No anaphylactic drug reactions.  Review of patient's allergies indicates:   Allergen Reactions    Egg Nausea And Vomiting      No metal allergies or sensitivities   No drug sensitivities or intolerances eggs cause nausea and vomiting.    MEDICATIONS    Current Outpatient Medications:     meloxicam (MOBIC) 15 MG tablet, Take 1 tablet (15 mg total) by mouth once daily. Take with meal., Disp: 30 tablet, Rfl: 2    valsartan-hydrochlorothiazide (DIOVAN-HCT) 80-12.5 mg per tablet, Take 1 tablet by mouth as needed., Disp: , Rfl:     metFORMIN (GLUCOPHAGE) 500 MG tablet, Take 500 mg by mouth 2 (two) times daily with meals. (Patient not taking: Reported on 1/16/2025), Disp: , Rfl:   There are no discontinued medications.    BMI  Body mass index is 40.71 kg/m².      REVIEW OF SYSTEMS:   Positive for back pain.  Reports no fevers chills sweats or shortness of breath no weight loss.  No heartburn or chest pain.  No urinary symptoms.    MEDICAL HX:   Past Medical History:   Diagnosis Date    Hypertension     Pre-diabetes        SURGICAL HISTORY:   Past Surgical History:   Procedure Laterality Date    ARTHROSCOPIC CHONDROPLASTY OF KNEE JOINT Left 10/10/2023    Procedure: ARTHROSCOPY, KNEE, WITH CHONDROPLASTY;  Surgeon: Og Roberts MD;  Location: [facility];  Service: Orthopedics;  Laterality: Left;    BACK SURGERY      HAND SURGERY      HERNIA REPAIR  11/2022    KNEE ARTHROSCOPY W/ MENISCECTOMY Left 10/10/2023    Procedure: ARTHROSCOPY, KNEE, WITH MENISCECTOMY;  Surgeon: Og Roberts MD;  Location: [facility] OR;  Service: Orthopedics;  Laterality: Left;    KNEE ARTHROSCOPY W/ PLICA EXCISION Left 10/10/2023    Procedure: EXCISION, PLICA, KNEE, ARTHROSCOPIC;  Surgeon: Og Roberts MD;  Location: [facility] OR;  Service: Orthopedics;  Laterality: Left;       SOCIAL HX:   Social History     Occupational History     Not on file   Tobacco Use    Smoking status: Never    Smokeless tobacco: Never   Substance and Sexual Activity    Alcohol use: Yes     Comment: 12 beers a year    Drug use: Never    Sexual activity: Yes       PHYSICAL EXAMINATION:  Pleasant and cooperative  gentleman with a BMI of 40.79 and a height of 5 ft 10 in  Normal body habitus.  Left knee tender at the medial joint line with positive crepitation.  Pain with flexion beyond 30° pain at 90° to 120° of flexion at the medial joint line.  Mild ballotable effusion suprapatellar.  Collateral ligaments are stable.  ACL and PCL are stable.  Calf is soft.  No popliteal cyst.  Normal pulses and capillary refill.  No peripheral edema.  Skin temperature tone normal.  Walks with antalgic gait favoring left knee.  Pain with squatting beyond 90°.  Difficulty rising from chair without pain and pushing off with his hands.  Quad calf hamstring strength 5/5 bilateral lower extremities.      XRAY:  Review of x-rays [facility] left knee 02/28/2023 mild narrowing of the medial joint space without osteophytes.  No significant sclerotic change.    Right knee actually shows more medial joint space narrowing in the left.  Kellgren Pelon grade 2 changes on the left knee      LABS REVIEWED  No visits with results within 4 Week(s) from this visit.   Latest known visit with results is:   Admission on 10/10/2023, Discharged on 10/10/2023   Component Date Value Ref Range Status    POCT Glucose 10/10/2023 112 (H)  70 - 110 mg/dL Final        IMAGING MRI performed at [facility] of the left knee demonstrates full-thickness chondral loss in the medial side of the joint and patellofemoral joint with significant chondromalacia changes.  Also a tear of the medial meniscus.    Patient Instructions     Encounter Diagnoses   Name Primary?    Primary osteoarthritis of left knee     Chronic pain of left knee     Injury of articular cartilage of left knee        ASSESSMENT:  Candidate for  knee replacement surgery.  Failed all conservative treatment episodes.  Previous arthroscopy 2023 with findings of full-thickness cartilage loss medial joint.  MRI shows evidence of involvement of patellofemoral joint to a significant degree.    PLAN:  Proceed to knee replacement surgery.  Outpatient 23 hour candidate.  Begin preoperative workup including blood work, x-rays and MRI for patient specific cutting block  Return to office for LP and coordination visit in a few weeks.    FOLLOW UP:  Final office visit with Dr. Martínez week before surgery for final consultation and consent obtainment.      Orders Placed This Encounter    WALKER FOR HOME USE    COMMODE FOR HOME USE    Culture, MRSA    X-Ray Hip to Ankle    X-Ray Chest 1 View Pre-OP    MRI Knee Without Contrast Left, [facility]    CBC Without Differential    APTT    Basic Metabolic Panel    Protime-INR    Ambulatory Referral/Consult to Physical Therapy/Occupational Therapy    Ambulatory referral/consult to Pre-Admit    EKG 12-lead    Type & Screen    Case Request Operating Room: ARTHROPLASTY, KNEE, TOTAL        No follow-ups on file.       40 min Patient encounter:  This includes face to face time and non-face to face time preparing to see the patient (eg, review of tests),   obtaining and/or reviewing separately obtained history, documenting clinical information in the electronic or other health record, independently interpreting results   and communicating results to the patient/family/caregiver, or care coordinator.         Roel Martínez M.D.  Orthopedic Surgeon  [facility] [place]

## 2025-01-16 NOTE — PATIENT INSTRUCTIONS
Encounter Diagnoses   Name Primary?    Primary osteoarthritis of left knee     Chronic pain of left knee     Injury of articular cartilage of left knee        ASSESSMENT:  Candidate for knee replacement surgery.  Failed all conservative treatment episodes.  Previous arthroscopy 2023 with findings of full-thickness cartilage loss medial joint.  MRI shows evidence of involvement of patellofemoral joint to a significant degree.    PLAN:  Proceed to knee replacement surgery.  Outpatient 23 hour candidate.  Begin preoperative workup including blood work, x-rays and MRI for patient specific cutting block  Return to office for LP and coordination visit in a few weeks.    FOLLOW UP:  Final office visit with Dr. Martínez week before surgery for final consultation and consent obtainment.

## 2025-02-04 ENCOUNTER — HOSPITAL ENCOUNTER (OUTPATIENT)
Dept: RADIOLOGY | Facility: HOSPITAL | Age: 50
Discharge: HOME | End: 2025-02-04
Attending: ORTHOPAEDIC SURGERY
Payer: OTHER MISCELLANEOUS

## 2025-02-04 ENCOUNTER — HOSPITAL ENCOUNTER (OUTPATIENT)
Dept: CARDIOLOGY | Facility: HOSPITAL | Age: 50
Discharge: HOME | End: 2025-02-04
Attending: ORTHOPAEDIC SURGERY
Payer: OTHER MISCELLANEOUS

## 2025-02-04 DIAGNOSIS — M17.12 PRIMARY OSTEOARTHRITIS OF LEFT KNEE: ICD-10-CM

## 2025-02-04 LAB
OHS QRS DURATION: 80 MS
OHS QTC CALCULATION: 393 MS

## 2025-02-04 PROCEDURE — 93010 ELECTROCARDIOGRAM REPORT: CPT | Mod: ,,, | Performed by: INTERNAL MEDICINE

## 2025-02-04 PROCEDURE — 77073 BONE LENGTH STUDIES: CPT | Mod: 26,,, | Performed by: RADIOLOGY

## 2025-02-04 PROCEDURE — 71045 X-RAY EXAM CHEST 1 VIEW: CPT | Mod: TC

## 2025-02-04 PROCEDURE — 77073 BONE LENGTH STUDIES: CPT | Mod: TC

## 2025-02-04 PROCEDURE — 71045 X-RAY EXAM CHEST 1 VIEW: CPT | Mod: 26,,, | Performed by: RADIOLOGY

## 2025-02-04 PROCEDURE — 93005 ELECTROCARDIOGRAM TRACING: CPT

## 2025-02-05 ENCOUNTER — TELEPHONE (OUTPATIENT)
Dept: RADIOLOGY | Facility: HOSPITAL | Age: 50
End: 2025-02-05
Payer: COMMERCIAL

## 2025-02-11 PROBLEM — M17.12 PRIMARY OSTEOARTHRITIS OF LEFT KNEE: Status: ACTIVE | Noted: 2025-02-11

## 2025-02-11 NOTE — ASSESSMENT & PLAN NOTE
- elevated initially and on repeat, pt has been off meds for some time now, will have pt monitor and home I a will will markos with reading. Pt has acceptable reading with PCP providers in December and September   - BP meds only as needed for LE edema take 1-2 x monthly   - keep follow-up with primary care

## 2025-02-11 NOTE — PROGRESS NOTES
Preoperative History and Physical                                                                                                  Chief Complaint: Preoperative evaluation     History of Present Illness:      Pio Navarro is a 49 y.o. male who presents to the office today for a preoperative consultation at the request of Dr. Martínez who plans on performing L TKA on March 6.     Functional Status:      The patient is able to climb a flight of stairs with knee pain . The patient is able to ambulate  without difficulty. The patient's functional status is affected by the surgical problem. The patient's functional status is not affected by shortness of breath, chest pain, dyspnea on exertion and fatigue.  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.can weed eat no CP no SOB   MET score greater than 4    Patient Anesthesia History:      History of Malignant Hyperthermia: no  History of Pseudocholinesterase Deficiency: no  History PONV: no  History of difficult intubation: no  History of delayed emergence: no    Family Anesthesia History:      History of Malignant Hyperthermia: no  History of Pseudocholinesterase Deficiency: no     Past Medical History:      Past Medical History:   Diagnosis Date    Hypertension     Pre-diabetes         Past Surgical History:      Past Surgical History:   Procedure Laterality Date    ARTHROSCOPIC CHONDROPLASTY OF KNEE JOINT Left 10/10/2023    Procedure: ARTHROSCOPY, KNEE, WITH CHONDROPLASTY;  Surgeon: Og Roberts MD;  Location: [facility];  Service: Orthopedics;  Laterality: Left;    BACK SURGERY      spinal cord stimulator placement    HAND SURGERY      HERNIA REPAIR  11/2022    KNEE ARTHROSCOPY W/ MENISCECTOMY Left 10/10/2023    Procedure: ARTHROSCOPY, KNEE, WITH MENISCECTOMY;  Surgeon: Og Roberts MD;  Location: [facility] OR;  Service: Orthopedics;  Laterality: Left;    KNEE ARTHROSCOPY W/ PLICA EXCISION Left 10/10/2023    Procedure: EXCISION, PLICA, KNEE, ARTHROSCOPIC;   Surgeon: Og Roberts MD;  Location: [facility];  Service: Orthopedics;  Laterality: Left;        Social History:      Social History     Socioeconomic History    Marital status:    Tobacco Use    Smoking status: Never    Smokeless tobacco: Never   Substance and Sexual Activity    Alcohol use: Yes     Comment: 12 beers a year    Drug use: Never    Sexual activity: Yes     Social Drivers of Health     Financial Resource Strain: Low Risk  (2/11/2025)    Overall Financial Resource Strain (CARDIA)     Difficulty of Paying Living Expenses: Not hard at all   Food Insecurity: No Food Insecurity (2/11/2025)    Hunger Vital Sign     Worried About Running Out of Food in the Last Year: Never true     Ran Out of Food in the Last Year: Never true   Transportation Needs: No Transportation Needs (2/11/2025)    PRAPARE - Transportation     Lack of Transportation (Medical): No     Lack of Transportation (Non-Medical): No   Physical Activity: Insufficiently Active (2/11/2025)    Exercise Vital Sign     Days of Exercise per Week: 2 days     Minutes of Exercise per Session: 10 min   Stress: No Stress Concern Present (2/11/2025)    [employer] [place] [employer] - Occupational Stress Questionnaire     Feeling of Stress : Not at all   Housing Stability: Low Risk  (2/11/2025)    Housing Stability Vital Sign     Unable to Pay for Housing in the Last Year: No     Number of Times Moved in the Last Year: 0     Homeless in the Last Year: No        Family History:      Family History   Problem Relation Name Age of Onset    Cancer Mother      Cancer Father      No Known Problems Sister      No Known Problems Sister      Cancer Maternal Grandmother      Cancer Maternal Grandfather      Cancer Paternal Grandmother      Cancer Paternal Grandfather         Allergies:      Review of patient's allergies indicates:   Allergen Reactions    Egg Nausea And Vomiting       Medications:      Current Outpatient Medications   Medication  Sig    meloxicam (MOBIC) 15 MG tablet Take 1 tablet (15 mg total) by mouth once daily. Take with meal.    valsartan-hydrochlorothiazide (DIOVAN-HCT) 80-12.5 mg per tablet Take 1 tablet by mouth as needed.    metFORMIN (GLUCOPHAGE) 500 MG tablet Take 500 mg by mouth 2 (two) times daily with meals. (Patient not taking: Reported on 12/13/2024)     No current facility-administered medications for this visit.       Vitals:      Vitals:    02/13/25 1221   BP: (!) 169/81   Pulse: 72   Temp: 97.8 °F (36.6 °C)       Review of Systems:        Review of Systems   Constitutional:  Negative for chills, fever and weight loss.   HENT:  Negative for congestion, ear discharge, ear pain and hearing loss.    Eyes:  Negative for pain and discharge.   Respiratory:  Negative for cough, hemoptysis, sputum production and wheezing.    Cardiovascular:  Positive for leg swelling (on occasion ,). Negative for chest pain and palpitations.   Gastrointestinal:  Negative for constipation, diarrhea, heartburn, nausea and vomiting.   Genitourinary:  Negative for dysuria, frequency and urgency.   Musculoskeletal:  Positive for joint pain (L knee pain). Negative for back pain, myalgias and neck pain.   Skin:  Negative for itching and rash.   Neurological:  Negative for dizziness, tingling, seizures, weakness and headaches.   Endo/Heme/Allergies:  Negative for environmental allergies.   Psychiatric/Behavioral:  Negative for depression.          Physical Exam:      Constitutional: Appears well-developed, well-nourished and in no acute distress.  Patient is oriented to person, place, and time.   Head: Normocephalic and atraumatic. Mucous membranes moist.  Neck: Neck supple no mass.   Cardiovascular: Normal rate and regular rhythm.  S1 S2 appreciated by ascultation.  Pulmonary/Chest: Effort normal and clear to auscultation bilaterally. No respiratory distress.   Abdomen:  non-distended.    Neurological: Patient is alert and oriented to person, place and  time. Moves all extremities.  Skin: Warm and dry. No lesions.  Extremities: No clubbing, cyanosis or edema.    Laboratory data:           Lab Results   Component Value Date    INR 1.0 2025    INR 1.0 2023       Lab Results   Component Value Date    WBC 3.99 2025    HGB 15.3 2025    HCT 46.0 2025    MCV 89 2025     2025           Predictors of intubation difficulty:       Morbid obesity? BMI 40    Anatomically abnormal facies? no   Prominent incisors? no   Receding mandible? no   Short, thick neck? yes - thick   Neck range of motion: normal   Dentition:  micro chips lower anterior teeth   Based on the Modified Mallampati, patient is a mallampati score: II (hard and soft palate, upper portion of tonsils anduvula visible)    Cardiographics:      EC2025  NSR   Echocardiogram:  none    Imaging:      Chest x-ray:  24    FINDINGS:  Cardiac silhouette and mediastinal contours are normal.  Lungs are clear.  Osseous structures are intact.     Impression:     No acute cardiopulmonary process.  Assessment and Plan:      Primary osteoarthritis of left knee  Patient presents at the request of Dr. Martínez who plans on performing a left total knee arthroplasty on   Known risk factors for perioperative complications: pre DM    Difficulty with intubation is anticipated.  Video laryngoscopy used for last intubation patient withModerately difficult with oral airway (2-person mask with oral airway in place)     Cardiac Risk Estimation:  Per Revised Cardiac Risk Index patient 's RCRI score is 0 with a 3.9% risk of a major cardiac event.      1.) Preoperative workup as follows: ECG, hemoglobin, hematocrit, electrolytes, creatinine, glucose, liver function studies.  2.) Change in medication regimen before surgery:  Hold NSAIDs 7 days preop, hold metformin 24 hours prior to the procedure if still taking .  3.) Prophylaxis for cardiac events with perioperative beta-blockers:  not indicated.  4.) Invasive hemodynamic monitoring perioperatively: not indicated.  5.) Deep vein thrombosis prophylaxis postoperatively: intermittent pneumatic compression boots and regimen to be chosen by surgical team.  6.) Surveillance for postoperative MI with ECG immediately postoperatively and on postoperati ve days 1 and 2 AND troponin levels 24 hours postoperatively and on day 4 or hospital discharge (whichever comes first): not indicated.  7.) Current medications which may produce withdrawal symptoms if withheld perioperatively: none  8.) Other measures:  none        HTN (hypertension)  - elevated initially and on repeat, pt has been off meds for some time now, will have pt monitor and home I a will will markos with reading. Pt has acceptable reading with PCP providers in December and September   - BP meds only as needed for LE edema take 1-2 x monthly   - keep follow-up with primary care      Pre-diabetes  A1c in September of 2024 was 5.5, patient is no longer in the prediabetic range, no longer on metformin  Continue with appropriate follow-ups with primary care    Chronic back pain  Spinal cord stimulator in place, functioning  -patient will bring remote with him morning of surgery to turn off during surgery  - multiple DDD in Lumbar spine into sacrum           Electronically signed   XENIA Gilbert PA-C

## 2025-02-11 NOTE — ASSESSMENT & PLAN NOTE
Patient presents at the request of Dr. Martínez who plans on performing a left total knee arthroplasty on March 6th  Known risk factors for perioperative complications: pre DM    Difficulty with intubation is anticipated.  Video laryngoscopy used for last intubation patient withModerately difficult with oral airway (2-person mask with oral airway in place)     Cardiac Risk Estimation:  Per Revised Cardiac Risk Index patient 's RCRI score is 0 with a 3.9% risk of a major cardiac event.      1.) Preoperative workup as follows: ECG, hemoglobin, hematocrit, electrolytes, creatinine, glucose, liver function studies.  2.) Change in medication regimen before surgery:  Hold NSAIDs 7 days preop, hold metformin 24 hours prior to the procedure if still taking .  3.) Prophylaxis for cardiac events with perioperative beta-blockers: not indicated.  4.) Invasive hemodynamic monitoring perioperatively: not indicated.  5.) Deep vein thrombosis prophylaxis postoperatively: intermittent pneumatic compression boots and regimen to be chosen by surgical team.  6.) Surveillance for postoperative MI with ECG immediately postoperatively and on postoperati ve days 1 and 2 AND troponin levels 24 hours postoperatively and on day 4 or hospital discharge (whichever comes first): not indicated.  7.) Current medications which may produce withdrawal symptoms if withheld perioperatively: none  8.) Other measures:  none

## 2025-02-12 ENCOUNTER — HOSPITAL ENCOUNTER (OUTPATIENT)
Dept: RADIOLOGY | Facility: HOSPITAL | Age: 50
Discharge: HOME | End: 2025-02-12
Attending: ORTHOPAEDIC SURGERY
Payer: OTHER MISCELLANEOUS

## 2025-02-12 DIAGNOSIS — M17.12 PRIMARY OSTEOARTHRITIS OF LEFT KNEE: ICD-10-CM

## 2025-02-12 PROCEDURE — 73721 MRI JNT OF LWR EXTRE W/O DYE: CPT | Mod: 26,LT,, | Performed by: RADIOLOGY

## 2025-02-12 PROCEDURE — 73721 MRI JNT OF LWR EXTRE W/O DYE: CPT | Mod: TC,LT

## 2025-02-13 ENCOUNTER — OFFICE VISIT (OUTPATIENT)
Dept: INTERNAL MEDICINE | Facility: CLINIC | Age: 50
End: 2025-02-13
Payer: OTHER MISCELLANEOUS

## 2025-02-13 ENCOUNTER — LAB VISIT (OUTPATIENT)
Dept: LAB | Facility: HOSPITAL | Age: 50
End: 2025-02-13
Attending: PHYSICIAN ASSISTANT
Payer: COMMERCIAL

## 2025-02-13 VITALS
BODY MASS INDEX: 40.24 KG/M2 | HEART RATE: 72 BPM | SYSTOLIC BLOOD PRESSURE: 169 MMHG | DIASTOLIC BLOOD PRESSURE: 81 MMHG | HEIGHT: 70 IN | TEMPERATURE: 98 F | WEIGHT: 281.06 LBS | OXYGEN SATURATION: 98 %

## 2025-02-13 DIAGNOSIS — R73.03 PRE-DIABETES: ICD-10-CM

## 2025-02-13 DIAGNOSIS — M17.12 PRIMARY OSTEOARTHRITIS OF LEFT KNEE: Primary | ICD-10-CM

## 2025-02-13 DIAGNOSIS — Z01.818 PRE-OP EXAM: ICD-10-CM

## 2025-02-13 DIAGNOSIS — G89.29 CHRONIC BACK PAIN, UNSPECIFIED BACK LOCATION, UNSPECIFIED BACK PAIN LATERALITY: ICD-10-CM

## 2025-02-13 DIAGNOSIS — I10 HYPERTENSION, UNSPECIFIED TYPE: ICD-10-CM

## 2025-02-13 DIAGNOSIS — M54.9 CHRONIC BACK PAIN, UNSPECIFIED BACK LOCATION, UNSPECIFIED BACK PAIN LATERALITY: ICD-10-CM

## 2025-02-13 LAB — POTASSIUM SERPL-SCNC: 5 MMOL/L (ref 3.5–5.1)

## 2025-02-13 PROCEDURE — 87081 CULTURE SCREEN ONLY: CPT | Performed by: ORTHOPAEDIC SURGERY

## 2025-02-13 PROCEDURE — 84132 ASSAY OF SERUM POTASSIUM: CPT | Performed by: PHYSICIAN ASSISTANT

## 2025-02-13 PROCEDURE — 36415 COLL VENOUS BLD VENIPUNCTURE: CPT | Performed by: PHYSICIAN ASSISTANT

## 2025-02-13 NOTE — ASSESSMENT & PLAN NOTE
Spinal cord stimulator in place, functioning  -patient will bring remote with him morning of surgery to turn off during surgery  - multiple DDD in Lumbar spine into sacrum

## 2025-02-13 NOTE — DISCHARGE INSTRUCTIONS
To confirm, your doctor has instructed you: Surgery is scheduled for 3/06/25.   Pre admit office will call the afternoon prior to surgery between 1PM and 3PM with arrival time.    Surgery will be at [facility],  The address is [address]. [place] [address].      IMPORTANT INSTRUCTIONS!    Do not eat or drink after 12 midnight, including water. Do not smoke or use chewing tobacco after 12 midnight!  OK to brush teeth, but no gum, candy, or mints!      *Take only these medicines with a small swallow of water-morning of surgery*     NONE         ____ Stop taking all vitamins, herbal supplements, Aspirin, & NSAIDS (Ibuprofen, Advil, Aleve) 7 days prior to surgery, as these can thin your blood.    ____ Weight loss medication, such as Adipex and Phentermine, must be stopped 14 days prior to surgery, no exceptions!    *Diabetic Patients: If you take diabetic or weight loss medication, do NOT take morning of surgery unless instructed by Doctor. Metformin to be stopped 24 hrs prior to surgery. DO NOT take short-acting insulin the day of surgery. Only take HALF of your regular dose of long-acting insulin the night before surgery, unless instructed otherwise. Blood sugars will be checked in pre-op.   ~Ozempic/Mounjaro/Wegovy/Trulicity/Semaglutide injections must be stopped 7 days prior to surgery.     Please notify MD office if you develop an active infection, are prescribed antibiotics by someone other than the surgeon doing your surgery, or visit urgent care/ER.    Bathing Instructions:   The night before surgery and the morning prior to coming to the hospital:    - Shower & rinse your body as usual with anti-bacterial Soap (Dial or Lever 2000)   -Hibiclens (if indicated) use AFTER anti-bacterial soap; 1 packet PM/1 packet in AM on surgical site only   -Do not use hibiclens on your head, face, or genitals.    -Do not wash with anti-bacterial soap after you use the hibiclens.    -Do not shave surgical  site 5-7 days prior to surgery.    -Pubic hair 7 days prior to surgery (OBGYN/Urology only).       Additional Instructions:   __ No makeup, powder, lotions, creams, or body spray to skin   __ No deodorant if you are having: breast procedure, PORT insertion, or shoulder surgery!   __ No nail polish or artificial nails due to risk of infection.             **SURGERY MAY BE CANCELLED AT SURGEON'S DISCRETION IF ARTIFICIAL/NAIL POLISH IS PRESENT!!!**  __ Please remove all piercings and leave all jewelry at home.    **SURGERY WILL BE CANCELLED IF PIERCINGS ARE PRESENT!!!**    __ Dentures, Hearing Aids and Contact Lens need to be removed prior to the start of surgery.    __ Avoid Alcoholic beverages 3 days prior to surgery, as it can thin the blood, unless told otherwise by pre-admit department.  __ Females: may need to give a urine sample the morning of surgery;   **Please ask  for a specimen cup if you need to use the restroom prior to being called into pre-op.**  __ Males: Stop ED medications (Viagra, Cialis) 24 hrs prior to surgery.  __ You must have transportation, and they MUST stay the entire time.   __  Bring photo ID and insurance information to hospital    What to Wear:  Clean, loose-fitting clothing. Please allow for dressings/bandages/surgical equipment/drains.   ~Breast Patients: We recommend a button up shirt so you do not have to lift your arms.   Amazon Link recommended by breast surgeons if you will have drains in place: https://a.co/d/eUGN7eI  ~Shoulder Patients: We recommend a button up shirt. If unavailable, an oversized t-shirt (2 sizes bigger than your normal size) or a stretchy dress will also work.   [employer] Link for hospital type button sleeve t-shirt: https://a.co/d/86BAbRk  ~Knee Patients: We recommend oversized pants/shorts or a dress to accommodate any knee braces in place. Braces will not be removed to get dressed.    [facility] Visitor/Ride Policy:    Only 1 adult allowed (18 or older)  to accompany you and MUST STAY through the entire admission length.      -Must have a ride home from a responsible adult that you know and trust.     -Medical Transport, Uber or Lyft can only be used if patient has a responsible adult to   accompany them during ride home.      ~Your ride MUST STAY the entire time until you are discharged~   Please notify the pre-admit department prior to surgery if you use medication transportation so we can verify your arrival/pickup time!   -The patient is responsible for setting up their own transportation!    Discharge Prescriptions:  Your discharge prescriptions will be sent next door to [facility], unless otherwise discussed with your surgeon. Your  will be responsible for calling the pharmacy ([phone]) to begin the prescription filling process. They will receive a text message with instructions once you are in recovery. Please make sure we have your insurance information and you bring a payment method (cash or card) if needed for prescriptions. If you have  insurance, please let the pre-op nurse know, as the pharmacy does not take this insurance.     *If you are running late or have questions the morning of surgery, please call the Pre-OP Department @ [phone].       *Please Call [facility] Pre-Admit Department for surgery instruction questions:   [phone]     Payment Questions:                Billing Question Numbers:         [phone]

## 2025-02-13 NOTE — ASSESSMENT & PLAN NOTE
A1c in September of 2024 was 5.5, patient is no longer in the prediabetic range, no longer on metformin  Continue with appropriate follow-ups with primary care

## 2025-02-15 LAB — MRSA SPEC QL CULT: NORMAL

## 2025-02-25 ENCOUNTER — OFFICE VISIT (OUTPATIENT)
Dept: ORTHOPEDICS | Facility: CLINIC | Age: 50
End: 2025-02-25
Payer: OTHER MISCELLANEOUS

## 2025-02-25 VITALS — BODY MASS INDEX: 40.09 KG/M2 | WEIGHT: 280 LBS | HEIGHT: 70 IN

## 2025-02-25 DIAGNOSIS — M17.11 PRIMARY OSTEOARTHRITIS OF RIGHT KNEE: Primary | ICD-10-CM

## 2025-02-25 PROCEDURE — 99999 PR PBB SHADOW E&M-EST. PATIENT-LVL III: CPT | Mod: PBBFAC,,, | Performed by: ORTHOPAEDIC SURGERY

## 2025-02-26 ENCOUNTER — PATIENT MESSAGE (OUTPATIENT)
Dept: ORTHOPEDICS | Facility: CLINIC | Age: 50
End: 2025-02-26
Payer: COMMERCIAL

## 2025-02-26 NOTE — PROGRESS NOTES
Roel Martínez MD  Orthopedic Surgeon   [address] [place] [address]     VISIT DATE: 2/25/2025  Time: 8:04 PM     Name: Pio Navarro  MRN: [id number]    PCP: Jarrod Lam MD  Insurance: Payor: [facility] / Plan: [facility] / Product Type: Worker's Comp /   Residence: [place], [place]    REASON FOR VISIT:  Final preoperative visit, total knee replacement (TKR)    CHIEF COMPLAINT:  Chronic left knee pain secondary to osteoarthritis    Patient Instructions     Encounter Diagnosis   Name Primary?    Primary osteoarthritis of right knee Yes       ASSESSMENT:  Cleared to proceed with knee replacement surgery.  Exhausted all reasonable conservative treatment for knee arthritis  Risks and benefits of surgical knee replacement discussed and informed consent obtained.      TREATMENT/PLAN:  Proceed to total knee replacement as scheduled, March 6, 2025  Spinal anesthetic  Planned overnight stay, due to afternoon surgery and distance of the home in [place].  Pain medications:  Oxycodone/tramadol/Tylenol  DVT prophylaxis:  Eliquis 2.5 mg b.i.d. times 12 days   Senna/docusate for constipation, Zofran for nausea, methocarbamol for muscle relaxer  OTC antihistamine:  Zyrtec or Xyzal recommended, may also use Benadryl.  RTO:  10-12 days postop with Matthew Solis PA-C or Dr. Martínez.       HISTORY:  Pio Navarro is a 49 y.o. male who final preoperative visit/discussion regarding scheduled total knee replacement.  The patient has end-stage osteoarthritis and has failed all reasonable conservative nonsurgical treatments available.  The patient has previously been counseled regarding the indications, risks and benefits of total knee replacement surgery.  The patient has been optimized to proceed to surgery, with the expectations of the low to moderate risk.    Patient has received preoperative clearances by PCP and cardiologist., as needed  Preoperative laboratories are within normal limits, with the exception of slight elevation  potassium 5.2 upper in normal range 5.1  EKG:  Normal sinus rhythm  Chest x-ray:  No acute disease  MRSA screen:  Negative  INR:  1.0    PERIOPERATIVE RISK ASSESSMENT:      (--) History of CVA  (--) History of TIA  (--) History of Pulmonary Embolus  (--) History of DVT  (--) Chronic narcotic use  (--) Smoker  (--) Diabetes  (--) History of MRSA infections  (--) History of wound healing complications  (--) History of anesthesia related complications  (--) True anaphylactic drug reactions  (--) History of bleeding/blood clotting disorder  (--) Aortic stenosis  (--) Pulmonary hypertension  (--) COPD  (--) Cardiac pacemaker/defibrillator  (--) Liver disease  (--) Kidney disease  (--) Peripheral vascular disease  (--) Lymphedema or chronic peripheral edema  (--) Neuromuscular disorder      Imaging:  MRI at [facility] July of 2024 shows moderate degenerative medial meniscus full-thickness broad patellofemoral cartilage loss focal chondromalacia in the medial femoral condyle.  X-rays from [facility] reviewed from February 20, 2023 - medial joint space narrowing greater than 50%  and varus deformity.    Previous arthroscopic findings from October 20, 2023 showed full exposure of bone of the medial side of the joint.    PHYSICAL EXAMINATION:  Body mass index is 40.17 kg/m².    GENERAL:  AAO x3, pleasant mood and normal affect, well dressed and well groomed.  Positive antalgic gait.  Pleasant mood and normal affect.  Accompanied by his wife.    EXTREMITY:  Left Knee  Skin clean dry and intact.  No peripheral edema of the left lower extremity.  No popliteal cyst.  Skin temperature and tone normal normal pulses and capillary refill.    Pain with squatting at 90° plus.  Positive pain and crepitation along the medial joint line.  Near full extension flexion to 120+.  Normal hip and ankle range of motion on the left.    SOCIAL HISTORY:   Occupation body works specialist  Lives with:  Wife in [place], [place]    Social History      Occupational History    Not on file   Tobacco Use    Smoking status: Never    Smokeless tobacco: Never   Substance and Sexual Activity    Alcohol use: Yes     Comment: 12 beers a year    Drug use: Never    Sexual activity: Yes       REVIEW OF SYSTEMS:   No fevers, chills, sweats  No chest pain or shortness of breath.    MEDICATIONS: Current Medications[1]    ALLERGIES: NO TRUE ANAPHYLACTIC REACTION    Review of patient's allergies indicates:   Allergen Reactions    Egg Nausea And Vomiting       MEDICAL HISTORY:   Past Medical History:   Diagnosis Date    Hypertension     Pre-diabetes        SURGICAL HISTORY:   Past Surgical History:   Procedure Laterality Date    ARTHROSCOPIC CHONDROPLASTY OF KNEE JOINT Left 10/10/2023    Procedure: ARTHROSCOPY, KNEE, WITH CHONDROPLASTY;  Surgeon: Og Roberts MD;  Location: [facility];  Service: Orthopedics;  Laterality: Left;    BACK SURGERY      spinal cord stimulator placement    HAND SURGERY      HERNIA REPAIR  11/2022    KNEE ARTHROSCOPY W/ MENISCECTOMY Left 10/10/2023    Procedure: ARTHROSCOPY, KNEE, WITH MENISCECTOMY;  Surgeon: Og Roberts MD;  Location: [facility] OR;  Service: Orthopedics;  Laterality: Left;    KNEE ARTHROSCOPY W/ PLICA EXCISION Left 10/10/2023    Procedure: EXCISION, PLICA, KNEE, ARTHROSCOPIC;  Surgeon: Og Roberts MD;  Location: [facility] OR;  Service: Orthopedics;  Laterality: Left;       Patient Type: Established Patient  (CPT 63217 - New 45-59 min)    Forty-five minute patient encounter  This includes face to face time and non-face to face time:   Preparing to see the patient (eg, review of tests & previous medical visits)    Obtaining and/or reviewing separately obtained history & documenting clinical information in the electronic record   Interpreting lab and/or imaging results and communicating results to the patient/family/caregiver.    Discussing current diagnosis with the patient/family/caregiver.   Reviewing the care plan for the  patient/family/caregiver.      DISCLAIMER: This note was prepared with My Single Point voice recognition transcription software.   Garbled syntax, mangled pronouns, and other bizarre constructions may be attributed to that software system.        Roel Martínez M.D.  Orthopedic Surgeon  Director of Outpatient Joint Replacement Program  [facility]           [1]   Current Outpatient Medications:     meloxicam (MOBIC) 15 MG tablet, Take 1 tablet (15 mg total) by mouth once daily. Take with meal., Disp: 30 tablet, Rfl: 2    metFORMIN (GLUCOPHAGE) 500 MG tablet, Take 500 mg by mouth 2 (two) times daily with meals. (Patient not taking: Reported on 12/13/2024), Disp: , Rfl:     valsartan-hydrochlorothiazide (DIOVAN-HCT) 80-12.5 mg per tablet, Take 1 tablet by mouth as needed., Disp: , Rfl:

## 2025-03-03 RX ORDER — OXYCODONE HYDROCHLORIDE 5 MG/1
5 TABLET ORAL EVERY 4 HOURS PRN
Refills: 0 | Status: CANCELLED | OUTPATIENT
Start: 2025-03-03

## 2025-03-03 RX ORDER — CEFAZOLIN 2 G/1
2 INJECTION, POWDER, FOR SOLUTION INTRAMUSCULAR; INTRAVENOUS EVERY 6 HOURS
Status: CANCELLED | OUTPATIENT
Start: 2025-03-03 | End: 2025-03-03

## 2025-03-03 RX ORDER — DIPHENHYDRAMINE HCL 25 MG
25 CAPSULE ORAL EVERY 6 HOURS PRN
Status: CANCELLED | OUTPATIENT
Start: 2025-03-03

## 2025-03-03 RX ORDER — TRAMADOL HYDROCHLORIDE 50 MG/1
100 TABLET ORAL EVERY 6 HOURS PRN
Status: CANCELLED | OUTPATIENT
Start: 2025-03-03

## 2025-03-03 RX ORDER — TRAMADOL HYDROCHLORIDE 50 MG/1
50 TABLET ORAL EVERY 4 HOURS PRN
Status: CANCELLED | OUTPATIENT
Start: 2025-03-03

## 2025-03-03 RX ORDER — ACETAMINOPHEN 500 MG
1000 TABLET ORAL 3 TIMES DAILY
Status: CANCELLED | OUTPATIENT
Start: 2025-03-03 | End: 2025-03-04

## 2025-03-03 RX ORDER — ONDANSETRON HYDROCHLORIDE 2 MG/ML
4 INJECTION, SOLUTION INTRAVENOUS EVERY 6 HOURS PRN
Status: CANCELLED | OUTPATIENT
Start: 2025-03-03

## 2025-03-03 RX ORDER — ONDANSETRON 4 MG/1
4 TABLET, ORALLY DISINTEGRATING ORAL EVERY 6 HOURS PRN
Status: CANCELLED | OUTPATIENT
Start: 2025-03-03

## 2025-03-03 RX ORDER — OXYCODONE HYDROCHLORIDE 5 MG/1
10 TABLET ORAL EVERY 4 HOURS PRN
Refills: 0 | Status: CANCELLED | OUTPATIENT
Start: 2025-03-03

## 2025-03-03 RX ORDER — AMOXICILLIN 250 MG
1 CAPSULE ORAL 2 TIMES DAILY
Status: CANCELLED | OUTPATIENT
Start: 2025-03-03

## 2025-03-03 RX ORDER — FAMOTIDINE 20 MG/1
20 TABLET, FILM COATED ORAL 2 TIMES DAILY
Status: CANCELLED | OUTPATIENT
Start: 2025-03-03

## 2025-03-03 RX ORDER — CYCLOBENZAPRINE HCL 5 MG
10 TABLET ORAL 3 TIMES DAILY PRN
Status: CANCELLED | OUTPATIENT
Start: 2025-03-03

## 2025-03-05 ENCOUNTER — ANESTHESIA EVENT (OUTPATIENT)
Dept: SURGERY | Facility: HOSPITAL | Age: 50
End: 2025-03-05
Payer: OTHER MISCELLANEOUS

## 2025-03-05 ENCOUNTER — TELEPHONE (OUTPATIENT)
Dept: PREADMISSION TESTING | Facility: HOSPITAL | Age: 50
End: 2025-03-05
Payer: COMMERCIAL

## 2025-03-05 NOTE — TELEPHONE ENCOUNTER
Called and spoke with the pt about the following:      Your Surgery arrival time is at 9:30 am on 3/06/25 at [facility].   The address is [address]. [place] [address].       *If you are running late or have questions the morning of surgery, please call the Pre-OP Department @ [phone].     Do not eat or drink after 12 midnight, including water. Do not smoke or use chewing tobacco after 12 midnight!  OK to brush teeth, but no gum, candy, or mints!      Additional Instructions:  You may be required to provide a urine sample prior to procedure;   Please ask  for a specimen cup if you need to use the restroom prior to being called into pre-op.     Please come to the main lobby and be prepared to show your photo ID and insurance card.       Only take specific medications discussed with the Pre-Admit Nurse.      Please call with any questions or concerns ([phone] or [phone])    [facility] Visitor/Ride Policy:   Adults:  Only 1 adult allowed (must be 18 or older) to accompany you and MUST STAY through the entire length of admission.     -Must have a ride home from a responsible adult that you know and trust.    -Medical Transport, Uber or Lyft can only be used if patient has a responsible adult to accompany them during ride home.    Pediatrics:  Pediatric patients are encouraged to have 2 adults (must be 18 or older) accompany them to the surgery center.   ~If the parent/legal guardian is unable to stay for the duration of the surgery time,   you MUST have someone over the age of 18 able to stay with the patient until time of discharge.     ~The parent/legal guardian must be available to be reached by phone at all times if they are unable to stay with the patient.

## 2025-03-05 NOTE — ANESTHESIA PREPROCEDURE EVALUATION
03/05/2025  Pio Navarro is a 49 y.o., male.  Past Medical History:   Diagnosis Date    Hypertension     Pre-diabetes      Past Surgical History:   Procedure Laterality Date    ARTHROSCOPIC CHONDROPLASTY OF KNEE JOINT Left 10/10/2023    Procedure: ARTHROSCOPY, KNEE, WITH CHONDROPLASTY;  Surgeon: Og Roberts MD;  Location: [facility];  Service: Orthopedics;  Laterality: Left;    BACK SURGERY      spinal cord stimulator placement    HAND SURGERY      HERNIA REPAIR  11/2022    KNEE ARTHROSCOPY W/ MENISCECTOMY Left 10/10/2023    Procedure: ARTHROSCOPY, KNEE, WITH MENISCECTOMY;  Surgeon: Og Roberts MD;  Location: [facility] OR;  Service: Orthopedics;  Laterality: Left;    KNEE ARTHROSCOPY W/ PLICA EXCISION Left 10/10/2023    Procedure: EXCISION, PLICA, KNEE, ARTHROSCOPIC;  Surgeon: Og Roberts MD;  Location: [facility];  Service: Orthopedics;  Laterality: Left;         Pre-op Assessment    I have reviewed the Patient Summary Reports.     I have reviewed the Nursing Notes. I have reviewed the NPO Status.   I have reviewed the Medications.     Review of Systems  Anesthesia Hx:  No problems with previous Anesthesia  Requires 2 person mask ventilation, no difficulty with intubation - [place]. History of prior surgery of interest to airway management or planning:  Previous anesthesia: General        Denies Family Hx of Anesthesia complications.    Denies Personal Hx of Anesthesia complications.                    Social:  Non-Smoker       Hematology/Oncology:  Hematology Normal                                     Cardiovascular:     Hypertension                                          Pulmonary:  Pulmonary Normal                       Renal/:  Renal/ Normal                 Hepatic/GI:  Hepatic/GI Normal                    Musculoskeletal:  Arthritis               Neurological:  Neurology Normal                                       Endocrine:     Pre-diabetes.      Morbid Obesity / BMI > 40  Psych:  Psychiatric Normal                    Physical Exam  General: Alert and Oriented    Airway:  Mallampati: II   Mouth Opening: Normal  TM Distance: Normal  Tongue: Large  Neck ROM: Normal ROM  Neck: Girth Increased    Dental:  Intact    Chest/Lungs:  Clear to auscultation, Normal Respiratory Rate    Heart:  Rate: Normal  Rhythm: Regular Rhythm        Anesthesia Plan  Type of Anesthesia, risks & benefits discussed:    Anesthesia Type: Spinal, Gen Natural Airway  Intra-op Monitoring Plan: Standard ASA Monitors  Post Op Pain Control Plan: multimodal analgesia and IV/PO Opioids PRN  Induction:  IV  Informed Consent: Informed consent signed with the Patient and all parties understand the risks and agree with anesthesia plan.  All questions answered.   ASA Score: 3  Day of Surgery Review of History & Physical: H&P Update referred to the surgeon/provider.    Ready For Surgery From Anesthesia Perspective.     .

## 2025-03-06 ENCOUNTER — ANESTHESIA (OUTPATIENT)
Dept: SURGERY | Facility: HOSPITAL | Age: 50
End: 2025-03-06
Payer: OTHER MISCELLANEOUS

## 2025-03-06 ENCOUNTER — HOSPITAL ENCOUNTER (OUTPATIENT)
Facility: HOSPITAL | Age: 50
Discharge: HOME | End: 2025-03-06
Attending: ORTHOPAEDIC SURGERY | Admitting: ORTHOPAEDIC SURGERY
Payer: OTHER MISCELLANEOUS

## 2025-03-06 DIAGNOSIS — M17.11 PRIMARY OSTEOARTHRITIS OF RIGHT KNEE: Primary | ICD-10-CM

## 2025-03-06 DIAGNOSIS — M17.12 PRIMARY OSTEOARTHRITIS OF LEFT KNEE: ICD-10-CM

## 2025-03-06 DIAGNOSIS — M17.12 PRIMARY OSTEOARTHRITIS OF LEFT KNEE: Primary | ICD-10-CM

## 2025-03-06 PROCEDURE — 25000003 PHARM REV CODE 250: Performed by: NURSE ANESTHETIST, CERTIFIED REGISTERED

## 2025-03-06 PROCEDURE — 63600175 PHARM REV CODE 636 W HCPCS: Performed by: NURSE ANESTHETIST, CERTIFIED REGISTERED

## 2025-03-06 PROCEDURE — 27201423 OPTIME MED/SURG SUP & DEVICES STERILE SUPPLY: Performed by: ORTHOPAEDIC SURGERY

## 2025-03-06 PROCEDURE — C1776 JOINT DEVICE (IMPLANTABLE): HCPCS | Performed by: ORTHOPAEDIC SURGERY

## 2025-03-06 PROCEDURE — 36000710: Performed by: ORTHOPAEDIC SURGERY

## 2025-03-06 PROCEDURE — 63600175 PHARM REV CODE 636 W HCPCS: Performed by: ORTHOPAEDIC SURGERY

## 2025-03-06 PROCEDURE — 25000003 PHARM REV CODE 250: Performed by: NURSE PRACTITIONER

## 2025-03-06 PROCEDURE — 27447 TOTAL KNEE ARTHROPLASTY: CPT | Mod: LT,,, | Performed by: ORTHOPAEDIC SURGERY

## 2025-03-06 PROCEDURE — 63600175 PHARM REV CODE 636 W HCPCS: Mod: JZ,TB | Performed by: ORTHOPAEDIC SURGERY

## 2025-03-06 PROCEDURE — 27447 TOTAL KNEE ARTHROPLASTY: CPT | Mod: AS,LT,,

## 2025-03-06 PROCEDURE — 25000003 PHARM REV CODE 250: Performed by: ORTHOPAEDIC SURGERY

## 2025-03-06 PROCEDURE — 63600175 PHARM REV CODE 636 W HCPCS: Performed by: ANESTHESIOLOGY

## 2025-03-06 PROCEDURE — 27800903 OPTIME MED/SURG SUP & DEVICES OTHER IMPLANTS: Performed by: ORTHOPAEDIC SURGERY

## 2025-03-06 PROCEDURE — 71000039 HC RECOVERY, EACH ADD'L HOUR: Performed by: ORTHOPAEDIC SURGERY

## 2025-03-06 PROCEDURE — 37000009 HC ANESTHESIA EA ADD 15 MINS: Performed by: ORTHOPAEDIC SURGERY

## 2025-03-06 PROCEDURE — 71000033 HC RECOVERY, INTIAL HOUR: Performed by: ORTHOPAEDIC SURGERY

## 2025-03-06 PROCEDURE — 71000015 HC POSTOP RECOV 1ST HR: Performed by: ORTHOPAEDIC SURGERY

## 2025-03-06 PROCEDURE — 63600175 PHARM REV CODE 636 W HCPCS: Performed by: NURSE PRACTITIONER

## 2025-03-06 PROCEDURE — 37000008 HC ANESTHESIA 1ST 15 MINUTES: Performed by: ORTHOPAEDIC SURGERY

## 2025-03-06 PROCEDURE — 36000711: Performed by: ORTHOPAEDIC SURGERY

## 2025-03-06 DEVICE — LEGION POSTERIOR STABILIZED HIGH                                    FLEX HIGHLY CROSS LINKED                                    POLYETHYLENE SIZE 5-6 9MM
Type: IMPLANTABLE DEVICE | Site: KNEE | Status: FUNCTIONAL
Brand: LEGION

## 2025-03-06 DEVICE — GENESIS II NON-POROUS TIBIAL                                    BASEPLATE SIZE 6 LT
Type: IMPLANTABLE DEVICE | Site: KNEE | Status: FUNCTIONAL
Brand: GENESIS II

## 2025-03-06 DEVICE — GENESIS II BICONVEX PATELLA 23MM
Type: IMPLANTABLE DEVICE | Site: KNEE | Status: FUNCTIONAL
Brand: GENESIS II

## 2025-03-06 DEVICE — LEGION POSTERIOR STABILIZED                                    OXINIUM FEMORAL SIZE 6 LEFT
Type: IMPLANTABLE DEVICE | Site: KNEE | Status: FUNCTIONAL
Brand: LEGION

## 2025-03-06 RX ORDER — DIPHENHYDRAMINE HYDROCHLORIDE 50 MG/ML
25 INJECTION, SOLUTION INTRAMUSCULAR; INTRAVENOUS EVERY 6 HOURS PRN
Status: DISCONTINUED | OUTPATIENT
Start: 2025-03-06 | End: 2025-03-06 | Stop reason: HOSPADM

## 2025-03-06 RX ORDER — TRANEXAMIC ACID 10 MG/ML
INJECTION, SOLUTION INTRAVENOUS
Status: DISCONTINUED
Start: 2025-03-06 | End: 2025-03-06 | Stop reason: HOSPADM

## 2025-03-06 RX ORDER — SODIUM CHLORIDE, SODIUM LACTATE, POTASSIUM CHLORIDE, CALCIUM CHLORIDE 600; 310; 30; 20 MG/100ML; MG/100ML; MG/100ML; MG/100ML
INJECTION, SOLUTION INTRAVENOUS CONTINUOUS
Status: DISCONTINUED | OUTPATIENT
Start: 2025-03-06 | End: 2025-03-06 | Stop reason: HOSPADM

## 2025-03-06 RX ORDER — FENTANYL CITRATE 50 UG/ML
25 INJECTION, SOLUTION INTRAMUSCULAR; INTRAVENOUS EVERY 5 MIN PRN
Status: DISCONTINUED | OUTPATIENT
Start: 2025-03-06 | End: 2025-03-06 | Stop reason: HOSPADM

## 2025-03-06 RX ORDER — AMOXICILLIN 250 MG
2 CAPSULE ORAL DAILY
Qty: 40 TABLET | Refills: 1 | Status: SHIPPED | OUTPATIENT
Start: 2025-03-06

## 2025-03-06 RX ORDER — LIDOCAINE HYDROCHLORIDE 20 MG/ML
INJECTION, SOLUTION EPIDURAL; INFILTRATION; INTRACAUDAL; PERINEURAL
Status: DISCONTINUED | OUTPATIENT
Start: 2025-03-06 | End: 2025-03-06

## 2025-03-06 RX ORDER — CELECOXIB 100 MG/1
200 CAPSULE ORAL ONCE
Status: COMPLETED | OUTPATIENT
Start: 2025-03-06 | End: 2025-03-06

## 2025-03-06 RX ORDER — ACETAMINOPHEN 500 MG
1000 TABLET ORAL ONCE
Status: COMPLETED | OUTPATIENT
Start: 2025-03-06 | End: 2025-03-06

## 2025-03-06 RX ORDER — MIDAZOLAM HYDROCHLORIDE 1 MG/ML
INJECTION INTRAMUSCULAR; INTRAVENOUS
Status: DISCONTINUED | OUTPATIENT
Start: 2025-03-06 | End: 2025-03-06

## 2025-03-06 RX ORDER — DEXAMETHASONE SODIUM PHOSPHATE 10 MG/ML
10 INJECTION, SOLUTION INTRA-ARTICULAR; INTRALESIONAL; INTRAMUSCULAR; INTRAVENOUS; SOFT TISSUE
Status: DISCONTINUED | OUTPATIENT
Start: 2025-03-06 | End: 2025-03-06

## 2025-03-06 RX ORDER — HYDROCODONE BITARTRATE AND ACETAMINOPHEN 5; 325 MG/1; MG/1
1 TABLET ORAL
Status: DISCONTINUED | OUTPATIENT
Start: 2025-03-06 | End: 2025-03-06 | Stop reason: HOSPADM

## 2025-03-06 RX ORDER — BUPIVACAINE 13.3 MG/ML
INJECTION, SUSPENSION, LIPOSOMAL INFILTRATION
Status: DISCONTINUED
Start: 2025-03-06 | End: 2025-03-06 | Stop reason: HOSPADM

## 2025-03-06 RX ORDER — KETOROLAC TROMETHAMINE 30 MG/ML
INJECTION, SOLUTION INTRAMUSCULAR; INTRAVENOUS
Status: DISCONTINUED | OUTPATIENT
Start: 2025-03-06 | End: 2025-03-06 | Stop reason: HOSPADM

## 2025-03-06 RX ORDER — ROCURONIUM BROMIDE 10 MG/ML
INJECTION, SOLUTION INTRAVENOUS
Status: DISCONTINUED | OUTPATIENT
Start: 2025-03-06 | End: 2025-03-06

## 2025-03-06 RX ORDER — DEXAMETHASONE SODIUM PHOSPHATE 10 MG/ML
5 INJECTION, SOLUTION INTRA-ARTICULAR; INTRALESIONAL; INTRAMUSCULAR; INTRAVENOUS; SOFT TISSUE
Status: DISCONTINUED | OUTPATIENT
Start: 2025-03-06 | End: 2025-03-06

## 2025-03-06 RX ORDER — PHENYLEPHRINE HYDROCHLORIDE 10 MG/ML
INJECTION INTRAVENOUS
Status: DISCONTINUED | OUTPATIENT
Start: 2025-03-06 | End: 2025-03-06

## 2025-03-06 RX ORDER — ALBUTEROL SULFATE 0.83 MG/ML
2.5 SOLUTION RESPIRATORY (INHALATION) EVERY 4 HOURS PRN
Status: DISCONTINUED | OUTPATIENT
Start: 2025-03-06 | End: 2025-03-06 | Stop reason: HOSPADM

## 2025-03-06 RX ORDER — FENTANYL CITRATE 50 UG/ML
INJECTION, SOLUTION INTRAMUSCULAR; INTRAVENOUS
Status: DISCONTINUED | OUTPATIENT
Start: 2025-03-06 | End: 2025-03-06

## 2025-03-06 RX ORDER — KETOROLAC TROMETHAMINE 30 MG/ML
30 INJECTION, SOLUTION INTRAMUSCULAR; INTRAVENOUS ONCE
Status: DISCONTINUED | OUTPATIENT
Start: 2025-03-06 | End: 2025-03-06 | Stop reason: HOSPADM

## 2025-03-06 RX ORDER — TRAMADOL HYDROCHLORIDE 50 MG/1
50 TABLET ORAL EVERY 4 HOURS PRN
Qty: 56 TABLET | Refills: 0 | Status: SHIPPED | OUTPATIENT
Start: 2025-03-06

## 2025-03-06 RX ORDER — NEOSTIGMINE METHYLSULFATE 0.5 MG/ML
INJECTION INTRAVENOUS
Status: DISCONTINUED | OUTPATIENT
Start: 2025-03-06 | End: 2025-03-06

## 2025-03-06 RX ORDER — ONDANSETRON 4 MG/1
4 TABLET, ORALLY DISINTEGRATING ORAL EVERY 6 HOURS PRN
Qty: 10 TABLET | Refills: 0 | Status: SHIPPED | OUTPATIENT
Start: 2025-03-06

## 2025-03-06 RX ORDER — EPHEDRINE SULFATE 50 MG/ML
INJECTION, SOLUTION INTRAVENOUS
Status: DISCONTINUED | OUTPATIENT
Start: 2025-03-06 | End: 2025-03-06

## 2025-03-06 RX ORDER — ONDANSETRON HYDROCHLORIDE 2 MG/ML
INJECTION, SOLUTION INTRAMUSCULAR; INTRAVENOUS
Status: DISCONTINUED | OUTPATIENT
Start: 2025-03-06 | End: 2025-03-06

## 2025-03-06 RX ORDER — BUPIVACAINE 13.3 MG/ML
INJECTION, SUSPENSION, LIPOSOMAL INFILTRATION
Status: DISCONTINUED | OUTPATIENT
Start: 2025-03-06 | End: 2025-03-06 | Stop reason: HOSPADM

## 2025-03-06 RX ORDER — ONDANSETRON HYDROCHLORIDE 2 MG/ML
4 INJECTION, SOLUTION INTRAVENOUS ONCE AS NEEDED
Status: DISCONTINUED | OUTPATIENT
Start: 2025-03-06 | End: 2025-03-06 | Stop reason: HOSPADM

## 2025-03-06 RX ORDER — KETOROLAC TROMETHAMINE 30 MG/ML
INJECTION, SOLUTION INTRAMUSCULAR; INTRAVENOUS
Status: DISCONTINUED
Start: 2025-03-06 | End: 2025-03-06 | Stop reason: HOSPADM

## 2025-03-06 RX ORDER — SODIUM CHLORIDE 9 MG/ML
INJECTION, SOLUTION INTRAVENOUS CONTINUOUS
Status: DISCONTINUED | OUTPATIENT
Start: 2025-03-06 | End: 2025-03-06 | Stop reason: HOSPADM

## 2025-03-06 RX ORDER — SUCCINYLCHOLINE CHLORIDE 20 MG/ML
INJECTION INTRAMUSCULAR; INTRAVENOUS
Status: DISCONTINUED | OUTPATIENT
Start: 2025-03-06 | End: 2025-03-06

## 2025-03-06 RX ORDER — OXYCODONE HYDROCHLORIDE 5 MG/1
5 TABLET ORAL EVERY 4 HOURS PRN
Qty: 21 TABLET | Refills: 0 | Status: SHIPPED | OUTPATIENT
Start: 2025-03-06

## 2025-03-06 RX ORDER — PROPOFOL 10 MG/ML
VIAL (ML) INTRAVENOUS
Status: DISCONTINUED | OUTPATIENT
Start: 2025-03-06 | End: 2025-03-06

## 2025-03-06 RX ORDER — METHOCARBAMOL 500 MG/1
500 TABLET, FILM COATED ORAL EVERY 6 HOURS PRN
Qty: 20 TABLET | Refills: 1 | Status: SHIPPED | OUTPATIENT
Start: 2025-03-06 | End: 2025-03-16

## 2025-03-06 RX ORDER — PREGABALIN 75 MG/1
150 CAPSULE ORAL ONCE
Status: COMPLETED | OUTPATIENT
Start: 2025-03-06 | End: 2025-03-06

## 2025-03-06 RX ADMIN — PROPOFOL 100 MG: 10 INJECTION, EMULSION INTRAVENOUS at 12:03

## 2025-03-06 RX ADMIN — EPHEDRINE SULFATE 12.5 MG: 50 INJECTION INTRAVENOUS at 01:03

## 2025-03-06 RX ADMIN — PROPOFOL 200 MG: 10 INJECTION, EMULSION INTRAVENOUS at 11:03

## 2025-03-06 RX ADMIN — FENTANYL CITRATE 50 MCG: 0.05 INJECTION, SOLUTION INTRAMUSCULAR; INTRAVENOUS at 02:03

## 2025-03-06 RX ADMIN — GLYCOPYRROLATE 0.4 MG: 0.2 INJECTION, SOLUTION INTRAMUSCULAR; INTRAVENOUS at 01:03

## 2025-03-06 RX ADMIN — PHENYLEPHRINE HYDROCHLORIDE 100 MCG: 10 INJECTION INTRAVENOUS at 12:03

## 2025-03-06 RX ADMIN — EPHEDRINE SULFATE 10 MG: 50 INJECTION INTRAVENOUS at 12:03

## 2025-03-06 RX ADMIN — DEXTROSE MONOHYDRATE 3 G: 5 INJECTION INTRAVENOUS at 12:03

## 2025-03-06 RX ADMIN — ONDANSETRON 4 MG: 2 INJECTION INTRAMUSCULAR; INTRAVENOUS at 01:03

## 2025-03-06 RX ADMIN — MIDAZOLAM 2 MG: 1 INJECTION INTRAMUSCULAR; INTRAVENOUS at 11:03

## 2025-03-06 RX ADMIN — PHENYLEPHRINE HYDROCHLORIDE 100 MCG: 10 INJECTION INTRAVENOUS at 01:03

## 2025-03-06 RX ADMIN — TRANEXAMIC ACID 1000 MG: 100 INJECTION, SOLUTION INTRAVENOUS at 02:03

## 2025-03-06 RX ADMIN — DEXAMETHASONE SODIUM PHOSPHATE 15 MG: 4 INJECTION, SOLUTION INTRA-ARTICULAR; INTRALESIONAL; INTRAMUSCULAR; INTRAVENOUS; SOFT TISSUE at 12:03

## 2025-03-06 RX ADMIN — ROCURONIUM BROMIDE 40 MG: 10 SOLUTION INTRAVENOUS at 12:03

## 2025-03-06 RX ADMIN — ROCURONIUM BROMIDE 10 MG: 10 SOLUTION INTRAVENOUS at 11:03

## 2025-03-06 RX ADMIN — FENTANYL CITRATE 100 MCG: 0.05 INJECTION, SOLUTION INTRAMUSCULAR; INTRAVENOUS at 11:03

## 2025-03-06 RX ADMIN — TRANEXAMIC ACID 1000 MG: 100 INJECTION, SOLUTION INTRAVENOUS at 12:03

## 2025-03-06 RX ADMIN — NEOSTIGMINE METHYLSULFATE 3 MG: 0.5 INJECTION INTRAVENOUS at 01:03

## 2025-03-06 RX ADMIN — EPHEDRINE SULFATE 12.5 MG: 50 INJECTION INTRAVENOUS at 02:03

## 2025-03-06 RX ADMIN — CELECOXIB 200 MG: 100 CAPSULE ORAL at 09:03

## 2025-03-06 RX ADMIN — SUCCINYLCHOLINE CHLORIDE 140 MG: 20 INJECTION, SOLUTION INTRAMUSCULAR; INTRAVENOUS; PARENTERAL at 11:03

## 2025-03-06 RX ADMIN — SODIUM CHLORIDE, POTASSIUM CHLORIDE, SODIUM LACTATE AND CALCIUM CHLORIDE: 600; 310; 30; 20 INJECTION, SOLUTION INTRAVENOUS at 11:03

## 2025-03-06 RX ADMIN — FENTANYL CITRATE 25 MCG: 0.05 INJECTION, SOLUTION INTRAMUSCULAR; INTRAVENOUS at 02:03

## 2025-03-06 RX ADMIN — FENTANYL CITRATE 100 MCG: 0.05 INJECTION, SOLUTION INTRAMUSCULAR; INTRAVENOUS at 12:03

## 2025-03-06 RX ADMIN — SODIUM CHLORIDE, POTASSIUM CHLORIDE, SODIUM LACTATE AND CALCIUM CHLORIDE: 600; 310; 30; 20 INJECTION, SOLUTION INTRAVENOUS at 01:03

## 2025-03-06 RX ADMIN — LIDOCAINE HYDROCHLORIDE 40 MG: 20 INJECTION, SOLUTION EPIDURAL; INFILTRATION; INTRACAUDAL; PERINEURAL at 11:03

## 2025-03-06 RX ADMIN — PREGABALIN 150 MG: 75 CAPSULE ORAL at 09:03

## 2025-03-06 RX ADMIN — ACETAMINOPHEN 1000 MG: 500 TABLET ORAL at 09:03

## 2025-03-06 NOTE — DISCHARGE SUMMARY
[facility]  Discharge Note  Short Stay    Procedure(s) (LRB):  ARTHROPLASTY, KNEE, TOTAL (Left)      OUTCOME: Patient tolerated treatment/procedure well without complication and is now ready for discharge.    Hospital Course:  Mr. Navarro 49 years of age.  He is a  male with a end-stage osteoarthritis of the left knee.  Primary total knee arthroplasty, cemented and posterior cruciate substituting with a Oxinium femoral component was performed.  There were no intraoperative complications.  He has started postoperative protocol for weight-bearing and range of motion.  Discharge criteria were met on day of surgery and patient was discharged to home.    DISPOSITION: Home or Self Care    FINAL DIAGNOSIS:  Primary osteoarthritis of left knee  Status post left total knee replacement.    FOLLOWUP: In clinic    DISCHARGE INSTRUCTIONS:  No discharge procedures on file.  After Knee Replacement Surgery  Follow your Surgeon's instructions    Ice and Elevate your knee 30 minutes at a time, every 2-3 hrs.  You may remove the Ace bandage at anytime.  Reapply the Ace bandage for compression support around the knee if helpful.  Use compression stockings for leg swelling if desired.    EMMA dressing to remain on until follow up appointment.   You can shower day 1, removing the white EMMA suction unit.   KEEP THE BANDAGE ON, until office follow up visit  You can cut the drain tubing at the bandage in 7 days, after suction stops.  No submerging knee under  water, until week 2-3, when incision fully healed.    If EMMA dressing malfunctions or loses suction seal, call office during daytime hours (8 AM - 4 PM).  If EMMA fully saturates with blood, call office during daytime hours for instructions.  If blood is leaking from the EMMA dressing,   Remove the entire dressing.  Place a sterile gauze dressing and Ace wrap around knee.  Call office during daytime hours for instructions.    Daily activities:  Weight bearing  as tolerated with walker, advance to cane as tolerated.   Home exercises every 3-4 hours during the day.  Get up and move around for 10-15 minutes, every 3-4 hours during the day.    Keep knee as straight as possible when sleeping at night.  You may begin driving when:  You are off all narcotic medications.  You can get in and out of car comfortably.  You can operate the vehicle safely without hesitation.    You may apply heating pad to thigh ,calf or foot as needed for comfort.  DO NOT put heating pad directly on front of knee incision area.    Resume your home diet.  Drink plenty of fluids.  Start Physical Therapy as scheduled.  Follow up with Dr. Martínez or Physician Matthew Hearn in approximately 10-12 days, as scheduled.    PAIN MEDICATIONS:  Relieve pain as directed using prescriptions provided by Dr. Martínez.    Your pain should always be TOLERABLE.  Call office during daytime hours (8 AM - 4 PM) for instructions if your pain is not controlled.    Other postoperative medications:  Blood thinner:  Be sure to start 1st dose within 12 -18 hours after your surgery.  Constipation medication:  Take on schedule to prevent constipation.  Discontinue if bowel movements become too loose/diarrhea.  Use muscle relaxer only as needed for spasm.    Call if you have...  A fever higher than 100.4°F (38.0°C) taken by mouth  Side effects from your medicine, such as prolonged nausea  A wet or loose dressing, or a dressing that is too tight  Excessive bleeding  Increased, ongoing pain or numbness  Signs of infection (such as drainage, warmth, or redness) at the incision site              TIME SPENT ON DISCHARGE: 15 minutes

## 2025-03-06 NOTE — CARE UPDATE
Discharge instructions given at bedside to patient and family. Patient verbally understands post op instructions. D/C IV with catheter intact. Wheeled patient to personal vehicle. Patient stable upon discharge.

## 2025-03-06 NOTE — OP NOTE
OPERATIVE REPORT  Date of Procedure: 3/6/2025   Name: Pio Navarro  MRN: [id number]    LOCATION: [facility]    SURGEON  Roel Martínez M.D., Orthopedic Surgeon      FIRST ASSISTANT  Matthew Solis PA-C - Certified First Assist  A first assistant was needed during the entire case for patient positioning/draping, skin and soft tissue retraction,   bone preparation and implant trialing, implant placement and cementation, deep tissue and skin closure, and wound dressing application.    ANESTHESIA  Dr. Mahesh Murphy with CRNA,  general anesthetic.     PREOPERATIVE DIAGNOSIS  Left Knee, Severe Degenerative Osteoarthritis.    POSTOPERATIVE DIAGNOSIS  Left Knee, Severe Degenerative Osteoarthritis.    PROCEDURE  Left Total Knee Arthroplasty. (Total Knee Replacement, Cemented)    Implants: CONNER & NEPHEW   Femur: 6 Oxinium , Legion posterior cruciate substituting.   Tibia: 6 Saray II.   Patella: 23 mm Inset   Poly: 9 mm, XLPE, PS.   Cement: Conner & Nephew Rally MV.     INJECTABLES  1. Exparel 266 mg injection, soft tissue protocol. + 30mg Toradol     2. Tranexamic acid, IV injection protocol.    ANTIBIOTIC  3 grams Ancef,  IV piggyback preop.    DRAINS  None.    SPECIMENS  Bone and cartilage femur/tibia/patella.    AQUAMANTYS/WERWOLF BIPOLAR CAUTERY: Utilized    TOURNIQUET TIME  90 minutes.    BLOOD LOSS  150 cc.    COMPLICATIONS  None.    CONDITION  Stable to recovery room.    DISPOSITION  Total Knee Arthroplasty Post-op protocol.   2.   Outpatient admission to the hospital.  3.   Same Day Discharge planned.      OPERATIVE INDICATIONS  The patient has progressive osteoarthritis of the LEFT knee.  Despite conservative treatment, the patient has progression of pain and  decreased activity of daily living.  The patient has elected to proceed with knee  arthroplasty as a definitive solution.  The risks, benefits,  potential  complications, and surgical alternatives were discussed.  Informed  consent was obtained.    Findings:    Bone: Excellent   Patella: Resurfaced   PCL: Sacrificed   PFT: Central, No lateral release   Deformity: VARUS 1.6   Pre ROM: 0-110   Post ROM: 0-130    PROCEDURE IN DETAIL  The patient was brought to the operating room and administered general  anesthetic and prepped for surgery in the usual sterile fashion exposing the knee to the operative field.   Skin was occluded with Ioban.  Younger foot positioner was placed over a stockinette and secured with  a Coban.  A time-out was called.  The patient was identified, and the  LEFT knee identified as the appropriate operative site.  The leg  was elevated, exsanguinated, and tourniquet inflated to 250 mmHg.    The knee was flexed, and a midline skin incision and medial parapatellar approach  was performed in the usual fashion.The patella was everted and then resected with a reamer/clamp   for an inset Biconvex component for the selected size.  The lateral 2-3 mm of the patella was excised sharply with a saw.    The ACL and PCL were resected.  The medial and lateral menisci were removed.  The popliteal tendon was preserved.  The Hoffa's fat pad was debrided.    Bony preparation of the femur was performed with a Visionaire cutting block for the appropriate size femoral component.  The distal cut was performed, followed by placement of the appropriate size 4-in-1 metallic femoral cutting block.  Anterior and posterior femoral cuts were performed followed by the chamfer cuts.     The tibia was prepared with the Visionaire cutting block for a the appropriate size tibial component.  The tibial resection was performed, and the tibial bone was removed.  A lamina  was placed with the knee an 90 degrees flexion.   /Werewolf bipolar cautery was utilized to coagulate capsular arterial vessels posteriorly, medially, laterally.  The appropriate trial tibial  component was placed on the cut tibial surface, and pinned into position, followed by performance of the tibial keel punch. The femoral trial implant then was placed.  The selected polyethylene spacer was placed and balanced the knee in full extension. The patella tracked centrally with no need for lateral release.The knee was balanced at 0, 30, 60, 90, and 120 degrees. The femoral box cut was prepared with the reamer and chisel. Bony debris and remnant PCL tissue was debrided from the knee. Posterior femoral osteophytes were removed with an osteotome and rongeur.  Exparel plus Toradol periarticular injection was placed into the posterior capsular tissues.    Once the final trial was performed, the trial implants were removed, and the bone  was dried, after pulsatile lavage saline was performed.  Cement was mixed  with suction vacuum. Hand pack/pressure technique was utilized on the 3 bony surfaces.    Placement of the implants was performed, tibia followed by femur, and  then finally the patella was clamped.  The trial polyethylene was  placed, and excess cement was removed from periphery of all components  after extending the knee and extruding cement.  The knee was placed in  full extension while the cement was allowed to fully harden.    Next, the knee was flexed.  The trial polyethylene was removed.  Irrigation was performed, and the parapatellar incisional site was injected with the Exparel plus Toradol mixture..  The final XLPE posterior cruciate substituting polyethylene was  snapped into the tibial tray.  The tourniquet was deflated. Bleeders were  controlled with cautery.    Closure was performed of the medial retinaculum with #2 Vicryl  interrupted figure-of-eight suture and a running #1 PDS StrataFIX suture proximally to distally, creating a watertight  closure of the retinaculum.    SubQ closure was then performed with 2-0  Vicryl interrupted suture, followed by a running subcuticular 3-0 StrataFix  PDO/PCL suture.  A EMMA 7 negative pressure dressing system was applied to the wound and activated.  An Ace wrap was placed over the anterior knee region.    The patient was taken to the postanesthesia recovery area in stable and  satisfactory condition.        Roel Martínez M.D.  Orthopedic Surgeon  [facility]

## 2025-03-06 NOTE — CARE UPDATE
Received patient from Elinor MERLOS in PACU. Patient is AAOx3 and is resting comfortably in bed with family at bedside. Surgical site to left knee is clan, dry and intact with salma dressing and ACE wrap in place. Patient safety measures in place: call light within reach, side rails up x2, bed locked and lowered. Will continue to monitor.

## 2025-03-06 NOTE — ANESTHESIA POSTPROCEDURE EVALUATION
Anesthesia Post Evaluation    Patient: Pio Navarro    Procedure(s) Performed: Procedure(s) (LRB):  ARTHROPLASTY, KNEE, TOTAL (Left)    Final Anesthesia Type: general      Patient location during evaluation: PACU  Patient participation: Yes- Able to Participate  Level of consciousness: awake and alert and oriented  Post-procedure vital signs: reviewed and stable  Pain management: adequate  Airway patency: patent    PONV status at discharge: No PONV  Anesthetic complications: no      Cardiovascular status: blood pressure returned to baseline, stable and hemodynamically stable  Respiratory status: unassisted  Hydration status: euvolemic  Follow-up not needed.              Vitals Value Taken Time   /74 03/06/25 15:16   Temp 36.7 °C (98.1 °F) 03/06/25 14:58   Pulse 99 03/06/25 15:18   Resp 5 03/06/25 15:18   SpO2 93 % 03/06/25 15:18   Vitals shown include unfiled device data.      No case tracking events are documented in the log.      Pain/Roberto Score: Pain Rating Prior to Med Admin: 4 (3/6/2025  9:42 AM)  Roberto Score: 3 (3/6/2025  2:58 PM)

## 2025-03-06 NOTE — TRANSFER OF CARE
"Anesthesia Transfer of Care Note    Patient: Pio Navarro    Procedure(s) Performed: Procedure(s) (LRB):  ARTHROPLASTY, KNEE, TOTAL (Left)    Patient location: PACU    Anesthesia Type: general    Transport from OR: Transported from OR on room air with adequate spontaneous ventilation    Post pain: adequate analgesia    Post assessment: no apparent anesthetic complications    Post vital signs: stable    Level of consciousness: awake    Nausea/Vomiting: no nausea/vomiting    Complications: none    Transfer of care protocol was followed      Last vitals: Visit Vitals  BP (!) 165/81 (BP Location: Right arm, Patient Position: Sitting)   Pulse 64   Temp 36.6 °C (97.8 °F) (Temporal)   Resp 18   Ht 5' 10" (1.778 m)   Wt 128 kg (282 lb 3 oz)   SpO2 99%   BMI 40.49 kg/m²     "

## 2025-03-06 NOTE — ANESTHESIA PROCEDURE NOTES
Intubation    Date/Time: 3/6/2025 11:45 AM    Performed by: Jia Goins CRNA  Authorized by: Jia Goins CRNA    Intubation:     Induction:  Intravenous    Intubated:  Postinduction    Mask Ventilation:  Easy mask    Attempts:  1    Attempted By:  CRNA    Method of Intubation:  Video laryngoscopy    Blade:  Jerome 4    Laryngeal View Grade: Grade I - full view of cords      Difficult Airway Encountered?: No      Complications:  None    Airway Device:  Oral endotracheal tube    Airway Device Size:  7.5    Style/Cuff Inflation:  Cuffed (inflated to minimal occlusive pressure)    Inflation Amount (mL):  5    Tube secured:  20    Secured at:  The teeth    Placement Verified By:  Capnometry    Complicating Factors:  None    Findings Post-Intubation:  BS equal bilateral and atraumatic/condition of teeth unchanged

## 2025-03-07 VITALS
OXYGEN SATURATION: 95 % | RESPIRATION RATE: 17 BRPM | BODY MASS INDEX: 40.4 KG/M2 | SYSTOLIC BLOOD PRESSURE: 140 MMHG | DIASTOLIC BLOOD PRESSURE: 55 MMHG | TEMPERATURE: 98 F | HEART RATE: 94 BPM | HEIGHT: 70 IN | WEIGHT: 282.19 LBS

## 2025-03-14 ENCOUNTER — OFFICE VISIT (OUTPATIENT)
Dept: ORTHOPEDICS | Facility: CLINIC | Age: 50
End: 2025-03-14
Payer: COMMERCIAL

## 2025-03-14 VITALS — HEIGHT: 70 IN | WEIGHT: 282.19 LBS | BODY MASS INDEX: 40.4 KG/M2

## 2025-03-14 DIAGNOSIS — Z96.652 STATUS POST TOTAL LEFT KNEE REPLACEMENT: Primary | ICD-10-CM

## 2025-03-14 DIAGNOSIS — M17.11 PRIMARY OSTEOARTHRITIS OF RIGHT KNEE: ICD-10-CM

## 2025-03-14 PROCEDURE — 99999 PR PBB SHADOW E&M-EST. PATIENT-LVL III: CPT | Mod: PBBFAC,,, | Performed by: ORTHOPAEDIC SURGERY

## 2025-03-14 RX ORDER — GABAPENTIN 300 MG/1
300 CAPSULE ORAL 2 TIMES DAILY
Qty: 60 CAPSULE | Refills: 2 | Status: SHIPPED | OUTPATIENT
Start: 2025-03-14 | End: 2026-03-14

## 2025-03-14 RX ORDER — TRAMADOL HYDROCHLORIDE 50 MG/1
50 TABLET ORAL EVERY 4 HOURS PRN
Qty: 56 TABLET | Refills: 0 | Status: SHIPPED | OUTPATIENT
Start: 2025-03-14

## 2025-03-14 NOTE — PATIENT INSTRUCTIONS
Encounter Diagnoses   Name Primary?    Status post total left knee replacement Yes    Primary osteoarthritis of right knee        ASSESSMENT:  Status post left total knee arthroplasty on 03/06/2025 doing great overall    TREATMENT/PLAN:  Refill Tramadol 50 mg tablet q.4 hours p.r.n.  Not requiring oxycodone - stop  Start Gabapentin 300 mg tablet twice daily  Continue Physical therapy   Return to office in 5 weeks with a x-ray left knee

## 2025-03-14 NOTE — PROGRESS NOTES
Roel Martínez MD  Orthopedic Surgeon   [facility]  [address], [place] [address]     VISIT DATE: 3/14/2025        Name: Pio Navarro  MRN: [id number]    PCP: Jarrod Lam MD  Insurance: Payor: [employer] / Plan: R [employer]ERED / Product Type: Commercial /   Residence:  [place], [place]     Reason for Visit:  Postop visit 1.: Total knee replacement    Patient Instructions     Encounter Diagnoses   Name Primary?    Status post total left knee replacement Yes    Primary osteoarthritis of right knee        ASSESSMENT:  Status post left total knee arthroplasty on 03/06/2025 doing great overall    TREATMENT/PLAN:  Refill Tramadol 50 mg tablet q.4 hours p.r.n.  Not requiring oxycodone - stop  Start Gabapentin 300 mg tablet twice daily  Continue Physical therapy   Return to office in 5 weeks with a x-ray left knee        HPI: Pio Navarro is a 49 y.o. male. who returns to the office today, 1 week status post TKR.    REVIEW: Post-op Symptoms/Complaints:  Muscle Spasm    PHYSICAL EXAMINATION:  Alert and oriented.  Well dressed and well groomed.  Ambulating with No Assistive Device    Incision site:  EMMA dressing removed  Incision site is benign and healing well.  No significant erythema or drainage at the incision.    ROM: 5-105    Mild swelling about the knee  No  bruising about the thigh knee and lower leg  Neurovascularly intact in the lower extremity.  Normal pulses and capillary refill.    No peripheral edema lower extremity.  Calf is soft without palpable cords with a negative Homans.  Good dorsiflexion/plantar flexion of the feet and toes.      DISCLAIMER: This note was prepared with [employer] voice recognition transcription software. Garbled syntax, mangled pronouns,  and other bizarre constructions may be attributed to that software system.           Roel Martínez M.D.  Orthopedic Surgeon  [facility]

## 2025-03-24 ENCOUNTER — PATIENT MESSAGE (OUTPATIENT)
Dept: ORTHOPEDICS | Facility: CLINIC | Age: 50
End: 2025-03-24
Payer: COMMERCIAL

## 2025-04-02 DIAGNOSIS — M17.11 PRIMARY OSTEOARTHRITIS OF RIGHT KNEE: ICD-10-CM

## 2025-04-04 RX ORDER — TRAMADOL HYDROCHLORIDE 50 MG/1
50 TABLET ORAL EVERY 4 HOURS PRN
Qty: 21 TABLET | Refills: 0 | Status: SHIPPED | OUTPATIENT
Start: 2025-04-04

## 2025-04-09 ENCOUNTER — PATIENT MESSAGE (OUTPATIENT)
Dept: ORTHOPEDICS | Facility: CLINIC | Age: 50
End: 2025-04-09
Payer: COMMERCIAL

## 2025-04-09 DIAGNOSIS — Z96.652 STATUS POST TOTAL LEFT KNEE REPLACEMENT: Primary | ICD-10-CM

## 2025-04-09 RX ORDER — CEPHALEXIN 500 MG/1
500 CAPSULE ORAL 3 TIMES DAILY
Qty: 30 CAPSULE | Refills: 0 | Status: SHIPPED | OUTPATIENT
Start: 2025-04-09 | End: 2025-04-11 | Stop reason: SDUPTHER

## 2025-04-09 RX ORDER — CEPHALEXIN 500 MG/1
500 CAPSULE ORAL 3 TIMES DAILY
Status: CANCELLED | OUTPATIENT
Start: 2025-04-09 | End: 2025-04-19

## 2025-04-09 NOTE — TELEPHONE ENCOUNTER
Spoke with patient and let him know that his medication was sent over to the pharmacy and Matthew will still see him on Friday.

## 2025-04-11 ENCOUNTER — OFFICE VISIT (OUTPATIENT)
Dept: ORTHOPEDICS | Facility: CLINIC | Age: 50
End: 2025-04-11
Payer: COMMERCIAL

## 2025-04-11 ENCOUNTER — HOSPITAL ENCOUNTER (OUTPATIENT)
Dept: RADIOLOGY | Facility: HOSPITAL | Age: 50
Discharge: HOME | End: 2025-04-11
Attending: ORTHOPAEDIC SURGERY
Payer: COMMERCIAL

## 2025-04-11 VITALS — BODY MASS INDEX: 40.4 KG/M2 | HEIGHT: 70 IN | WEIGHT: 282.19 LBS

## 2025-04-11 DIAGNOSIS — Z96.652 STATUS POST TOTAL LEFT KNEE REPLACEMENT: Primary | ICD-10-CM

## 2025-04-11 DIAGNOSIS — Z96.652 STATUS POST TOTAL LEFT KNEE REPLACEMENT: ICD-10-CM

## 2025-04-11 PROCEDURE — 73564 X-RAY EXAM KNEE 4 OR MORE: CPT | Mod: TC,LT

## 2025-04-11 PROCEDURE — 73564 X-RAY EXAM KNEE 4 OR MORE: CPT | Mod: 26,LT,, | Performed by: RADIOLOGY

## 2025-04-11 PROCEDURE — 73562 X-RAY EXAM OF KNEE 3: CPT | Mod: 26,59,RT, | Performed by: RADIOLOGY

## 2025-04-11 PROCEDURE — 99999 PR PBB SHADOW E&M-EST. PATIENT-LVL III: CPT | Mod: PBBFAC,,,

## 2025-04-11 RX ORDER — CEPHALEXIN 500 MG/1
500 CAPSULE ORAL 3 TIMES DAILY
Qty: 30 CAPSULE | Refills: 0 | Status: SHIPPED | OUTPATIENT
Start: 2025-04-11

## 2025-04-11 NOTE — PROGRESS NOTES
VISIT DATE: 4/11/2025       Name: Pio Navarro  MRN: [id number]    PCP: Jarrod Lam MD  Insurance: Payor: [employer] / Plan: [employer]ERED / Product Type: Commercial /     Post Op Global Billing: Yes    Reason for Visit: TKR Postop visit - 5 weeks    Patient Instructions     Encounter Diagnosis   Name Primary?    Status post total left knee replacement Yes       ASSESSMENT:  5-6 weeks status post left total knee arthroplasty  Resolving stitch abscess over proximal portion of surgical wound    TREATMENT/PLAN:  Continue Keflex as previously prescribed   Keflex refill sent today if redness persists after completion of previous prescription   Follow up in 10 days       HPI: Pio Navarro is a 49 y.o. male. who returns to the office today, 5-6 week status post TKR.  He reports an area of swelling and redness over the proximal portion of the incision that started roughly 1 week ago.  He noticed that a piece of stitch was visible through the skin.  Mild amount of whitish material was expressed from this area at that time.  He started taking Keflex that we prescribed a few days ago.  Overall, swelling and redness has significantly improved.  Denies any fever, chills, any other areas of redness.    REVIEW: Post-op Symptoms/Complaints:  Resolving stitch abscess over proximal portion of wound     XRAY: [facility] today  Smith and Nephew implant, posterior cruciate substituting and cemented  Patella resurfaced with femoral and tibial implants anatomically sized and positioned.  Excellent cement mantle all 3 components.  Excellent knee alignment.    PHYSICAL EXAMINATION:  Alert and oriented.  Well dressed and well groomed.  Ambulating with No Assistive Device    Incision site:  Incision site is largely benign and well healing.  No significant erythema or drainage at the incision.   +area of focal erythema with scabbing over proximal portion of incision without drainage. Mildly TTP. Consistent  with resolving stitch abscess.     ROM: 0-110  Patella tracking centrally  Knee stable at extension, mid flexion and deep flexion    Mild swelling about the knee  No  bruising about the thigh knee and lower leg  Neurovascularly intact in the lower extremity.  Normal pulses and capillary refill.  Mild peripheral edema lower extremity.    Calf is soft without palpable cords with a negative Homans.  Good dorsiflexion/plantar flexion of the feet and toes.      DOMINIC Still-C   Orthopedic Surgery

## 2025-04-11 NOTE — PATIENT INSTRUCTIONS
Encounter Diagnosis   Name Primary?    Status post total left knee replacement Yes       ASSESSMENT:  5-6 weeks status post left total knee arthroplasty  Resolving stitch abscess over proximal portion of surgical wound    TREATMENT/PLAN:  Continue Keflex as previously prescribed   Keflex refill sent today if redness persists after completion of previous prescription   Follow up in 10 days

## 2025-04-14 ENCOUNTER — PATIENT MESSAGE (OUTPATIENT)
Dept: ORTHOPEDICS | Facility: CLINIC | Age: 50
End: 2025-04-14
Payer: COMMERCIAL

## 2025-04-14 ENCOUNTER — TELEPHONE (OUTPATIENT)
Dept: ORTHOPEDICS | Facility: CLINIC | Age: 50
End: 2025-04-14
Payer: COMMERCIAL

## 2025-04-14 DIAGNOSIS — Z96.652 STATUS POST TOTAL LEFT KNEE REPLACEMENT: Primary | ICD-10-CM

## 2025-04-14 RX ORDER — GABAPENTIN 300 MG/1
300 CAPSULE ORAL 2 TIMES DAILY
Qty: 60 CAPSULE | Refills: 1 | Status: SHIPPED | OUTPATIENT
Start: 2025-04-14

## 2025-04-14 NOTE — TELEPHONE ENCOUNTER
----- Message from Romero Hernandez sent at 4/14/2025  4:02 PM CDT -----  Regarding: addl orders needed  PATIENT CALLSandra from [facility] called regarding plan of care. States that they sent over a form requesting more Pt sessions, no response.  Please call fax necessary documentation to [phone] or they can be reached at [phone]

## 2025-04-22 ENCOUNTER — PATIENT MESSAGE (OUTPATIENT)
Dept: ORTHOPEDICS | Facility: CLINIC | Age: 50
End: 2025-04-22
Payer: COMMERCIAL

## 2025-06-09 ENCOUNTER — PATIENT MESSAGE (OUTPATIENT)
Dept: ORTHOPEDICS | Facility: CLINIC | Age: 50
End: 2025-06-09
Payer: COMMERCIAL

## 2025-06-10 NOTE — TELEPHONE ENCOUNTER
Patient has been called and notified he's been scheduled for right  leg pain with  on June 20.2025 at 12:45 pm. Patient verbalized understanding.

## 2025-06-20 ENCOUNTER — OFFICE VISIT (OUTPATIENT)
Dept: ORTHOPEDICS | Facility: CLINIC | Age: 50
End: 2025-06-20
Payer: OTHER MISCELLANEOUS

## 2025-06-20 VITALS — HEIGHT: 70 IN | WEIGHT: 282.19 LBS | BODY MASS INDEX: 40.4 KG/M2

## 2025-06-20 DIAGNOSIS — Z96.652 STATUS POST TOTAL LEFT KNEE REPLACEMENT: Primary | ICD-10-CM

## 2025-06-20 RX ORDER — NABUMETONE 500 MG/1
500 TABLET, FILM COATED ORAL 2 TIMES DAILY
Qty: 60 TABLET | Refills: 1 | Status: SHIPPED | OUTPATIENT
Start: 2025-06-20

## 2025-06-20 NOTE — PROGRESS NOTES
Roel Martínez MD  Orthopedic Surgeon   [address], [place] [address]     VISIT DATE: 6/20/2025       Name: Pio Navarro  MRN: [id number]    PCP: Jarrod Lam MD  Insurance: Payor: [facility] / Plan: [facility] / Product Type: Worker's Comp /   Residence: [place], [place]  Post Op Global Period: No    Reason for Visit:  TKR Postop Visit :  3+ month    Chief Complaint:  Pain 3/10 until noon 7-8/10 with prolonged walking and standing    Patient Instructions     Encounter Diagnosis   Name Primary?    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    TREATMENT/PLAN:  Continue therapy twice week for 4 more weeks central PT  Nabumetone 500 mg twice daily number 60 with 1 refill.  Discontinue ibuprofen  Recheck office 6 weeks to evaluate job readiness      HPI: Pio Navarro is a 49 y.o. male. who returns to the office today, status post TKR  Three months postop.  Stitch abscess resolved after Keflex.  Pain earlier today is manageable 3 or so out of 10.  By the afternoon time when prolonged walking and standing his knee swells up and begins hurting 7/10.  He does body work auto detail repair.  Not yet ready to go back to work due to need for extended walking standing and bending and squatting.  He has been using ibuprofen 800 mg 3 times a day.  Continued in physical therapy with 2 visits remaining.  REVIEW: Post-op Symptoms/Complaints:  Swelling    Alert and oriented.  Well dressed and well groomed.  Ambulating with No Assistive Device    ROM:  0 to 110+  Stable in extension, mid flexion and deep flexion.\  Patella tracking centrally, without crepitation or significant pain.    Incision site:  Incision site is benign and well healed.  No significant erythema or drainage at the incision.    Mild knee joint effusion.  No  bruising about the thigh knee and lower  leg  Neurovascularly intact in the lower extremity.  Normal pulses and capillary refill.  Mild peripheral edema lower extremity.  1+ left and right    Calf is soft without palpable cords with a negative Homans.  Good dorsiflexion/plantar flexion of the feet and toes.      Established Patient  (CPT 17013 - Estab 20-29 min)  Twenty minute patient encounter  This includes face to face time and non-face to face time preparing to see the patient (eg, review of tests),   obtaining and/or reviewing separately obtained history, documenting clinical information in the electronic or other health record, independently interpreting results   and communicating results to the patient/family/caregiver, or care coordinator.         DISCLAIMER: This note was prepared with [employer] voice recognition transcription software. Garbled syntax, mangled pronouns, and other bizarre constructions may be attributed to that software system.         Roel Martínez M.D.  Orthopedic Surgeon  [facility]

## 2025-06-20 NOTE — PATIENT INSTRUCTIONS
Encounter Diagnosis   Name Primary?    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    TREATMENT/PLAN:  Continue therapy twice week for 4 more weeks central PT  Nabumetone 500 mg twice daily number 60 with 1 refill.  Discontinue ibuprofen  Recheck office 6 weeks to evaluate job readiness     Calm

## 2025-07-29 ENCOUNTER — PATIENT MESSAGE (OUTPATIENT)
Dept: ADMINISTRATIVE | Facility: HOSPITAL | Age: 50
End: 2025-07-29
Payer: COMMERCIAL

## 2025-08-01 ENCOUNTER — PATIENT MESSAGE (OUTPATIENT)
Dept: ORTHOPEDICS | Facility: CLINIC | Age: 50
End: 2025-08-01
Payer: COMMERCIAL

## 2025-08-27 ENCOUNTER — OFFICE VISIT (OUTPATIENT)
Dept: ORTHOPEDICS | Facility: CLINIC | Age: 50
End: 2025-08-27
Payer: OTHER MISCELLANEOUS

## 2025-08-27 VITALS — WEIGHT: 282.19 LBS | HEIGHT: 70 IN | BODY MASS INDEX: 40.4 KG/M2

## 2025-08-27 DIAGNOSIS — Z96.652 STATUS POST TOTAL LEFT KNEE REPLACEMENT: Primary | ICD-10-CM

## 2025-08-27 PROCEDURE — 99999 PR PBB SHADOW E&M-EST. PATIENT-LVL III: CPT | Mod: PBBFAC,,, | Performed by: ORTHOPAEDIC SURGERY

## 2025-08-27 PROCEDURE — 99213 OFFICE O/P EST LOW 20 MIN: CPT | Mod: S$GLB,,, | Performed by: ORTHOPAEDIC SURGERY

## 2025-08-27 RX ORDER — CELECOXIB 200 MG/1
200 CAPSULE ORAL 2 TIMES DAILY
Qty: 60 CAPSULE | Refills: 2 | Status: SHIPPED | OUTPATIENT
Start: 2025-08-27

## (undated) DEVICE — SUT 3-0 MONOCRYL PLUS PS-2

## (undated) DEVICE — Device

## (undated) DEVICE — MIXER BONE CEMENT

## (undated) DEVICE — SOCKINETTE IMPERVIOUS 12X48IN

## (undated) DEVICE — UNDERGLOVES BIOGEL PI SIZE 7.5

## (undated) DEVICE — PACK BASIC SETUP SC BR

## (undated) DEVICE — MANIFOLD 4 PORT

## (undated) DEVICE — TAPE SURGICAL MICROFOAM 4IN

## (undated) DEVICE — COVER CAMERA OPERATING ROOM

## (undated) DEVICE — BANDAGE ACE DOUBLE STER 6IN

## (undated) DEVICE — COVER PROXIMA MAYO STAND

## (undated) DEVICE — SYR 30CC LUER LOCK

## (undated) DEVICE — UNDERGLOVES BIOGEL PI SIZE 8

## (undated) DEVICE — TIP SUCTION YANKAUER

## (undated) DEVICE — BANDAGE ESMARK ELASTIC ST 6X9

## (undated) DEVICE — PAD ABD 8X10 STERILE

## (undated) DEVICE — UNDERGLOVES BIOGEL PI SZ 7 LF

## (undated) DEVICE — TOWEL OR DISP STRL BLUE 4/PK

## (undated) DEVICE — SUT ETHILON 3-0 PS2 18 BLK

## (undated) DEVICE — DRAPE T EXTRM SURG 121X128X90

## (undated) DEVICE — COVER TABLE HVY DTY 60X90IN

## (undated) DEVICE — DRAPE U SPLIT SHEET 54X76IN

## (undated) DEVICE — ELECTRODE BLD 1 INCH TEFLON

## (undated) DEVICE — SPONGE LAP 18X18 PREWASHED

## (undated) DEVICE — PAD CAST SPECIALIST STRL 6

## (undated) DEVICE — UNDERGLOVES BIOGEL PI SIZE 8.5

## (undated) DEVICE — SUT STRATAFIX MON PS2 3-0 12IN

## (undated) DEVICE — COVER LIGHT HANDLE 80/CA

## (undated) DEVICE — ALCOHOL 70% ANTISEPTIC ISO 4OZ

## (undated) DEVICE — SOL IRR NACL .9% 3000ML

## (undated) DEVICE — DRESSING PICO 7 TWO 10X30CM

## (undated) DEVICE — DRAPE THREE-QTR REINF 53X77IN

## (undated) DEVICE — INSTRUMENT FRAZIER 10FR W/VENT

## (undated) DEVICE — SUT VICRYL PLUS 0 CT1 18IN

## (undated) DEVICE — KIT IRR SUCTION HND PIECE

## (undated) DEVICE — APPLICATOR CHLORAPREP ORN 26ML

## (undated) DEVICE — BNDG COFLEX FOAM LF2 ST 4X5YD

## (undated) DEVICE — ZIPPER T4/TS TOGA

## (undated) DEVICE — GLOVE SURG ULTRA TOUCH 6

## (undated) DEVICE — BLADE SURG CARBON STEEL #10

## (undated) DEVICE — TUBING PUMP ARTHROSCOPY STRL

## (undated) DEVICE — GOWN SMARTGOWN LVL4 X-LONG XL

## (undated) DEVICE — BLADE SHAVER TORPEDO 4MMX13CM

## (undated) DEVICE — GLOVE SURG BIOGEL LATEX SZ 7.5

## (undated) DEVICE — SPONGE COTTON TRAY 4X4IN

## (undated) DEVICE — SHAVER TORPEDO CURVED 4MM 13CM

## (undated) DEVICE — BLADES SAW SAGITTAL SYSTEM 6

## (undated) DEVICE — KIT TURNOVER

## (undated) DEVICE — DRAPE INCISE IOBAN 2 23X33IN

## (undated) DEVICE — WRAP PROTECTIVE LEG POS STRL

## (undated) DEVICE — NDL ECLIPSE SAFETY 23G 1.5IN

## (undated) DEVICE — ELECTRODE REM PLYHSV RETURN 9

## (undated) DEVICE — UNDERGLOVES BIOGEL PI SZ 6 LF

## (undated) DEVICE — DRAPE ORTH SPLIT 77X108IN

## (undated) DEVICE — EVACUATOR PENCIL SMOKE NEPTUNE

## (undated) DEVICE — MAT SURGICAL ECOSUCTIONER

## (undated) DEVICE — POSITIONER HEAD DONUT 9IN FOAM

## (undated) DEVICE — DRAPE STERI INSTRUMENT 1018

## (undated) DEVICE — STOCKINETTE TUBULAR 2PL 6 X 4

## (undated) DEVICE — SYR IRRIGATION BULB STER 60ML

## (undated) DEVICE — BANDAGE MATRIX HK LOOP 6IN 5YD

## (undated) DEVICE — GLOVE PROTEXIS LTX  8.5

## (undated) DEVICE — SUPPORT ULNA NERVE PROTECTOR

## (undated) DEVICE — DEVICE STRATAFIX SYMMETRIC +

## (undated) DEVICE — SHAVER SABRETOOTH 4MMX12.5CM

## (undated) DEVICE — MARKER SKIN RULER STERILE

## (undated) DEVICE — SUT VICRYL 2-0 27 CT-1

## (undated) DEVICE — UNDERPAD ULTRASORBS ADV 30X36

## (undated) DEVICE — BRUSH SCRUB HIBICLENS 4%

## (undated) DEVICE — GLOVE BIOGEL 7.5

## (undated) DEVICE — TUBING SUCTION STRAIGHT .25X20

## (undated) DEVICE — TOURNIQUET SB QC DP 34X4IN